# Patient Record
Sex: FEMALE | Race: WHITE | NOT HISPANIC OR LATINO | URBAN - METROPOLITAN AREA
[De-identification: names, ages, dates, MRNs, and addresses within clinical notes are randomized per-mention and may not be internally consistent; named-entity substitution may affect disease eponyms.]

---

## 2024-04-29 ENCOUNTER — EMERGENCY (EMERGENCY)
Facility: HOSPITAL | Age: 69
LOS: 0 days | Discharge: HOME | End: 2024-04-29
Attending: EMERGENCY MEDICINE
Payer: MEDICARE

## 2024-04-29 VITALS
TEMPERATURE: 98 F | SYSTOLIC BLOOD PRESSURE: 164 MMHG | HEART RATE: 81 BPM | OXYGEN SATURATION: 96 % | DIASTOLIC BLOOD PRESSURE: 72 MMHG | RESPIRATION RATE: 18 BRPM

## 2024-04-29 VITALS — HEIGHT: 65 IN

## 2024-04-29 DIAGNOSIS — H35.30 UNSPECIFIED MACULAR DEGENERATION: ICD-10-CM

## 2024-04-29 DIAGNOSIS — R00.2 PALPITATIONS: ICD-10-CM

## 2024-04-29 DIAGNOSIS — I48.92 UNSPECIFIED ATRIAL FLUTTER: ICD-10-CM

## 2024-04-29 LAB
ALBUMIN SERPL ELPH-MCNC: 3.4 G/DL — SIGNIFICANT CHANGE UP (ref 3.3–5)
ALP SERPL-CCNC: 54 U/L — SIGNIFICANT CHANGE UP (ref 40–120)
ALT FLD-CCNC: 22 U/L — SIGNIFICANT CHANGE UP (ref 12–78)
ANION GAP SERPL CALC-SCNC: 2 MMOL/L — LOW (ref 5–17)
APTT BLD: 33.1 SEC — SIGNIFICANT CHANGE UP (ref 24.5–35.6)
AST SERPL-CCNC: 21 U/L — SIGNIFICANT CHANGE UP (ref 15–37)
BASOPHILS # BLD AUTO: 0.04 K/UL — SIGNIFICANT CHANGE UP (ref 0–0.2)
BASOPHILS NFR BLD AUTO: 0.5 % — SIGNIFICANT CHANGE UP (ref 0–2)
BILIRUB SERPL-MCNC: 0.3 MG/DL — SIGNIFICANT CHANGE UP (ref 0.2–1.2)
BUN SERPL-MCNC: 20 MG/DL — SIGNIFICANT CHANGE UP (ref 7–23)
CALCIUM SERPL-MCNC: 9.1 MG/DL — SIGNIFICANT CHANGE UP (ref 8.5–10.1)
CHLORIDE SERPL-SCNC: 108 MMOL/L — SIGNIFICANT CHANGE UP (ref 96–108)
CO2 SERPL-SCNC: 30 MMOL/L — SIGNIFICANT CHANGE UP (ref 22–31)
CREAT SERPL-MCNC: 1.23 MG/DL — SIGNIFICANT CHANGE UP (ref 0.5–1.3)
EGFR: 48 ML/MIN/1.73M2 — LOW
EOSINOPHIL # BLD AUTO: 0.11 K/UL — SIGNIFICANT CHANGE UP (ref 0–0.5)
EOSINOPHIL NFR BLD AUTO: 1.3 % — SIGNIFICANT CHANGE UP (ref 0–6)
GLUCOSE SERPL-MCNC: 87 MG/DL — SIGNIFICANT CHANGE UP (ref 70–99)
HCT VFR BLD CALC: 47.4 % — HIGH (ref 34.5–45)
HGB BLD-MCNC: 14.9 G/DL — SIGNIFICANT CHANGE UP (ref 11.5–15.5)
IMM GRANULOCYTES NFR BLD AUTO: 0.4 % — SIGNIFICANT CHANGE UP (ref 0–0.9)
INR BLD: 0.9 RATIO — SIGNIFICANT CHANGE UP (ref 0.85–1.18)
LYMPHOCYTES # BLD AUTO: 2 K/UL — SIGNIFICANT CHANGE UP (ref 1–3.3)
LYMPHOCYTES # BLD AUTO: 23.7 % — SIGNIFICANT CHANGE UP (ref 13–44)
MAGNESIUM SERPL-MCNC: 2.2 MG/DL — SIGNIFICANT CHANGE UP (ref 1.6–2.6)
MCHC RBC-ENTMCNC: 30 PG — SIGNIFICANT CHANGE UP (ref 27–34)
MCHC RBC-ENTMCNC: 31.4 GM/DL — LOW (ref 32–36)
MCV RBC AUTO: 95.4 FL — SIGNIFICANT CHANGE UP (ref 80–100)
MONOCYTES # BLD AUTO: 0.69 K/UL — SIGNIFICANT CHANGE UP (ref 0–0.9)
MONOCYTES NFR BLD AUTO: 8.2 % — SIGNIFICANT CHANGE UP (ref 2–14)
NEUTROPHILS # BLD AUTO: 5.58 K/UL — SIGNIFICANT CHANGE UP (ref 1.8–7.4)
NEUTROPHILS NFR BLD AUTO: 65.9 % — SIGNIFICANT CHANGE UP (ref 43–77)
NT-PROBNP SERPL-SCNC: 801 PG/ML — HIGH (ref 0–125)
PHOSPHATE SERPL-MCNC: 3.6 MG/DL — SIGNIFICANT CHANGE UP (ref 2.5–4.5)
PLATELET # BLD AUTO: 244 K/UL — SIGNIFICANT CHANGE UP (ref 150–400)
POTASSIUM SERPL-MCNC: 4.2 MMOL/L — SIGNIFICANT CHANGE UP (ref 3.5–5.3)
POTASSIUM SERPL-SCNC: 4.2 MMOL/L — SIGNIFICANT CHANGE UP (ref 3.5–5.3)
PROT SERPL-MCNC: 6.9 GM/DL — SIGNIFICANT CHANGE UP (ref 6–8.3)
PROTHROM AB SERPL-ACNC: 10.2 SEC — SIGNIFICANT CHANGE UP (ref 9.5–13)
RBC # BLD: 4.97 M/UL — SIGNIFICANT CHANGE UP (ref 3.8–5.2)
RBC # FLD: 14.1 % — SIGNIFICANT CHANGE UP (ref 10.3–14.5)
SODIUM SERPL-SCNC: 140 MMOL/L — SIGNIFICANT CHANGE UP (ref 135–145)
TROPONIN I, HIGH SENSITIVITY RESULT: 20.9 NG/L — SIGNIFICANT CHANGE UP
TROPONIN I, HIGH SENSITIVITY RESULT: 23.91 NG/L — SIGNIFICANT CHANGE UP
TSH SERPL-MCNC: 1.37 UU/ML — SIGNIFICANT CHANGE UP (ref 0.34–4.82)
WBC # BLD: 8.45 K/UL — SIGNIFICANT CHANGE UP (ref 3.8–10.5)
WBC # FLD AUTO: 8.45 K/UL — SIGNIFICANT CHANGE UP (ref 3.8–10.5)

## 2024-04-29 PROCEDURE — 93010 ELECTROCARDIOGRAM REPORT: CPT

## 2024-04-29 PROCEDURE — 84484 ASSAY OF TROPONIN QUANT: CPT | Mod: 59

## 2024-04-29 PROCEDURE — 85025 COMPLETE CBC W/AUTO DIFF WBC: CPT

## 2024-04-29 PROCEDURE — 85730 THROMBOPLASTIN TIME PARTIAL: CPT

## 2024-04-29 PROCEDURE — 84100 ASSAY OF PHOSPHORUS: CPT

## 2024-04-29 PROCEDURE — 71046 X-RAY EXAM CHEST 2 VIEWS: CPT | Mod: 26

## 2024-04-29 PROCEDURE — 80053 COMPREHEN METABOLIC PANEL: CPT

## 2024-04-29 PROCEDURE — 36415 COLL VENOUS BLD VENIPUNCTURE: CPT

## 2024-04-29 PROCEDURE — 84443 ASSAY THYROID STIM HORMONE: CPT

## 2024-04-29 PROCEDURE — 99285 EMERGENCY DEPT VISIT HI MDM: CPT | Mod: 25

## 2024-04-29 PROCEDURE — 83880 ASSAY OF NATRIURETIC PEPTIDE: CPT

## 2024-04-29 PROCEDURE — 99285 EMERGENCY DEPT VISIT HI MDM: CPT

## 2024-04-29 PROCEDURE — 85610 PROTHROMBIN TIME: CPT

## 2024-04-29 PROCEDURE — 83735 ASSAY OF MAGNESIUM: CPT

## 2024-04-29 PROCEDURE — 93005 ELECTROCARDIOGRAM TRACING: CPT

## 2024-04-29 PROCEDURE — 71046 X-RAY EXAM CHEST 2 VIEWS: CPT

## 2024-04-29 NOTE — ED PROVIDER NOTE - PATIENT PORTAL LINK FT
You can access the FollowMyHealth Patient Portal offered by [facility] by registering at the following website: [email or web address]. By joining [facility] FollowMyHealth portal, you will also be able to view your health information using other applications (apps) compatible with our system.

## 2024-04-29 NOTE — ED STATDOCS - PROGRESS NOTE DETAILS
Belgica Gomes for attending Dr. Knight: 68 y/o female with a PMHx of macular degeneration presents to the ED sent by Dr. Brower. Pt was seen in Dr. Brower's office regarding palpitations, had EKG in office showing SVT to 163 and was sent to the ED for evaluation. Denies CP. Smoker. Will send pt to main ED for further evaluation.

## 2024-04-29 NOTE — ED PROVIDER NOTE - OBJECTIVE STATEMENT
68 y/o female with PMHx of macular degeneration, chronic constipation presents to the ED SIB MD Dr. Brower for A-flutter noted at outpatient office. Patient states she had too much to eat at a diner and afterward presented to her PCP, who referred her to the ED for A-flutter. Patient states she has history of palpitations and these episodes are normally resolved with ASA. Denies chest pain, SOB, abdominal pain.

## 2024-04-29 NOTE — ED PROVIDER NOTE - PROGRESS NOTE DETAILS
second troponin negative, spoke with Dr. Funk via teams may be dc and fu in office tomorrow B Rey MCKINNEY

## 2024-04-29 NOTE — ED ADULT TRIAGE NOTE - CCCP TRG CHIEF CMPLNT
[FreeTextEntry1] : Patient is s/p MVR with a bioprosthetic valve in 2013.  This was performed for primary MR.  Recently she has noted increasing dyspnea on moderate exertion, Class 2.  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
sent by MD

## 2024-04-29 NOTE — ED PROVIDER NOTE - CARE PROVIDER_API CALL
Dino Funk  Interventional Cardiology  [address] [place] [address]  Phone: [phone]  Fax: [phone]  Follow Up Time:

## 2024-04-29 NOTE — ED PROVIDER NOTE - PHYSICAL EXAMINATION
Gen:  Well appearing in NAD  Head:  NC/AT  HEENT: pupils perrl,no pharyngeal erythema, uvula midline  Cardiac: S1S2, RRR. HR 70s, sinus rhythm.  Abd: Soft, non tender  Resp: No distress, CTA   musculoskeletal:: no deformities, no swelling, strength +5/+5  Skin: warm and dry as visualized, no rashes  Neuro: ASHLEY, aao x 4  Psych:alert, cooperative, appropriate mood and affect for situation

## 2024-04-29 NOTE — ED ADULT TRIAGE NOTE - CHIEF COMPLAINT QUOTE
Pt sent in by MD Brower for palpitations. Pt reports her EKG said SVT. Pt's HR in triage is irregular going from 120s to the 80s and back up. Pt denies CP or SOB. pt has hx of HLD and denies hx of AFIB. STAT EKG in triage.

## 2024-04-29 NOTE — ED ADULT NURSE NOTE - NSFALLUNIVINTERV_ED_ALL_ED
Bed/Stretcher in lowest position, wheels locked, appropriate side rails in place/Call bell, personal items and telephone in reach/Instruct patient to call for assistance before getting out of bed/chair/stretcher/Non-slip footwear applied when patient is off stretcher/[place] to call system/Physically safe environment - no spills, clutter or unnecessary equipment/Purposeful proactive rounding/Room/bathroom lighting operational, light cord in reach

## 2024-04-29 NOTE — ED PROVIDER NOTE - NSFOLLOWUPINSTRUCTIONS_ED_ALL_ED_FT
Palpitations  Palpitations are feelings that your heartbeat is not normal. Your heartbeat may feel like it is:  Uneven (irregular).  Faster than normal.  Fluttering.  Skipping a beat.  This is usually not a serious problem. However, a doctor will do tests and check your medical history to make sure that you do not have a serious heart problem.    Follow these instructions at home:  Watch for any changes in your condition. Tell your doctor about any changes. Take these actions to help manage your symptoms:    Eating and drinking    Follow instructions from your doctor about things to eat and drink. You may be told to avoid these things:  Drinks that have caffeine in them, such as coffee, tea, soft drinks, and energy drinks.  Chocolate.  Alcohol.  Diet pills.  Lifestyle    A person sitting on the floor doing yoga.  A sign telling the reader not to smoke.  Try to lower your stress. These things can help you relax:  Yoga.  Deep breathing and meditation.  Guided imagery. This is using words and images to create positive thoughts.  Exercise, including swimming, jogging, and walking. Tell your doctor if you have more abnormal heartbeats when you are active. If you have chest pain or feel short of breath with exercise, do not keep doing the exercise until you are seen by your doctor.  Biofeedback. This is using your mind to control things in your body, such as your heartbeat.  Get plenty of rest and sleep. Keep a regular bed time.  Do not use drugs, such as cocaine or ecstasy. Do not use marijuana.  Do not smoke or use any products that contain nicotine or tobacco. If you need help quitting, ask your doctor.  General instructions    Take over-the-counter and prescription medicines only as told by your doctor.  Keep all follow-up visits. You may need more tests if palpitations do not go away or get worse.  Contact a doctor if:  You keep having fast or uneven heartbeats for a long time.  Your symptoms happen more often.  Get help right away if:  You have chest pain.  You feel short of breath.  You have a very bad headache.  You feel dizzy.  You faint.  These symptoms may be an emergency. Get help right away. Call your local emergency services (911 in the [place]  Do not wait to see if the symptoms will go away.  Do not drive yourself to the hospital.  Summary  Palpitations are feelings that your heartbeat is uneven or faster than normal. It may feel like your heart is fluttering or skipping a beat.  Avoid food and drinks that may cause this condition. These include caffeine, chocolate, and alcohol.  Try to lower your stress. Do not smoke or use drugs.  Get help right away if you faint, feel dizzy, feel short of breath, have chest pain, or have a very bad headache.  This information is not intended to replace advice given to you by your health care provider. Make sure you discuss any questions you have with your health care provider.    Document Revised: 05/11/2022 Document Reviewed: 05/11/2022  [employer] Patient Education © 2024 [employer]  [employer] logo  Terms and Conditions  Privacy Policy  Editorial Policy  All content on this site: Copyright © 2024 [employer], its licensors, and contributors. All rights are reserved, including those for text and data mining, AI training, and similar technologies. For all open access content, the Creative Commons licensing terms apply.  Cookies are used by this site. To decline or learn more, visit our Cookies page.

## 2024-06-03 PROBLEM — H35.30 UNSPECIFIED MACULAR DEGENERATION: Chronic | Status: ACTIVE | Noted: 2024-04-29

## 2024-06-10 NOTE — H&P ADULT - HISTORY OF PRESENT ILLNESS
70 y/o F with PMHx of HLD current smoking, family hx of early MI (mother passed at 64)  presented to cardiology after being seen in the ED for palpitations. CE neg x 2, in ED. Cardiac stress test done suggestive of LAD ischemia. Referred for cardiac cath to further evaluate.  70 y/o F with PMHx of HLD current smoking, family hx of early MI (mother passed at 64)  presented to cardiology after being seen in the ED for palpitations. CE neg x 2, in ED. Pt reports SOB with minimal activities such as walking around the house.  Cardiac stress test done suggestive of LAD ischemia. Referred for cardiac cath to further evaluate.

## 2024-06-10 NOTE — H&P ADULT - NSHPREVIEWOFSYSTEMS_GEN_ALL_CORE
REVIEW OF SYSTEMS:    CONSTITUTIONAL: No weakness, fevers or chills  EYES/ENT: No visual changes;  No vertigo or throat pain   NECK: No pain or stiffness  RESPIRATORY: No cough, wheezing, hemoptysis; + shortness of breath on exertion  CARDIOVASCULAR: No chest pain or palpitations  GASTROINTESTINAL: No abdominal or epigastric pain. No nausea, vomiting, or hematemesis; No diarrhea or constipation. No melena or hematochezia.  GENITOURINARY: No dysuria, frequency or hematuria  NEUROLOGICAL: No numbness or weakness  SKIN: No itching, burning, rashes, or lesions   All other review of systems is negative unless indicated above.

## 2024-06-10 NOTE — H&P ADULT - ASSESSMENT
70 y/o F with PMHx of HLD current smoking, family hx of early MI (mother passed at 64)  presented to cardiology after being seen in the ED for palpitations. CE neg x 2, in ED. Cardiac stress test done suggestive of LAD ischemia. Referred for cardiac cath to further evaluate.     ASA class:  Creatinine:  GFR:  Bleeding  Risk score:  Hussain Score:  70 y/o F with PMHx of HLD current smoking, family hx of early MI (mother passed at 64)  presented to cardiology after being seen in the ED for palpitations. CE neg x 2, in ED. Cardiac stress test done suggestive of LAD ischemia. Referred for cardiac cath to further evaluate.     ASA class:II  Creatinine:1.24  GFR:48  Bleeding  Risk score:1.24  Hussain Score: 3 points

## 2024-06-10 NOTE — H&P ADULT - NSHPLABSRESULTS_GEN_ALL_CORE
6/9/24 STRESS- LAD moderate disease    6/1/24 ECHO- EF 55-60% 6/9/24 STRESS- LAD moderate disease    6/1/24 ECHO- EF 55-60%  6/11/24: EKG  NSR                          15.8   9.37  )-----------( 232      ( 11 Jun 2024 07:30 )             49.6

## 2024-06-10 NOTE — H&P ADULT - NSHPPHYSICALEXAM_GEN_ALL_CORE
PHYSICAL EXAM:    Constitutional: NAD,   Neuro: Alert and oriented x 3 Gait steady Speech clear No focal deficits  Neck: No JVD No bruit  Respiratory: CTAB  Cardiovascular: S1 and S2, RRR,   Gastrointestinal: BS+, soft, NT/ND  Extremities: No clubbing cyanosis or edema No varicosities  Vascular: 2+ peripheral pulses  Psychiatric: Normal mood, normal affect  Musculoskeletal: 5/5 strength b/l upper and lower extremities  Skin: excoriated area under right lower abdominal fold

## 2024-06-11 ENCOUNTER — RESULT REVIEW (OUTPATIENT)
Age: 69
End: 2024-06-11

## 2024-06-11 ENCOUNTER — INPATIENT (INPATIENT)
Facility: HOSPITAL | Age: 69
LOS: 7 days | Discharge: HOME | DRG: 998 | End: 2024-06-19
Attending: THORACIC SURGERY (CARDIOTHORACIC VASCULAR SURGERY) | Admitting: THORACIC SURGERY (CARDIOTHORACIC VASCULAR SURGERY)
Payer: MEDICARE

## 2024-06-11 ENCOUNTER — OUTPATIENT (OUTPATIENT)
Dept: OUTPATIENT SERVICES | Facility: HOSPITAL | Age: 69
LOS: 1 days | Discharge: ANOTHER HOSPITAL | End: 2024-06-11
Payer: MEDICARE

## 2024-06-11 VITALS
SYSTOLIC BLOOD PRESSURE: 181 MMHG | OXYGEN SATURATION: 95 % | WEIGHT: 169.98 LBS | RESPIRATION RATE: 18 BRPM | HEIGHT: 65 IN | HEART RATE: 75 BPM | DIASTOLIC BLOOD PRESSURE: 87 MMHG | TEMPERATURE: 99 F

## 2024-06-11 VITALS
HEART RATE: 60 BPM | RESPIRATION RATE: 18 BRPM | OXYGEN SATURATION: 97 % | SYSTOLIC BLOOD PRESSURE: 154 MMHG | DIASTOLIC BLOOD PRESSURE: 73 MMHG

## 2024-06-11 VITALS
DIASTOLIC BLOOD PRESSURE: 87 MMHG | OXYGEN SATURATION: 98 % | WEIGHT: 186.51 LBS | SYSTOLIC BLOOD PRESSURE: 167 MMHG | HEART RATE: 62 BPM | RESPIRATION RATE: 17 BRPM | TEMPERATURE: 98 F | HEIGHT: 65 IN

## 2024-06-11 DIAGNOSIS — I25.1 ATHEROSCLEROTIC HEART DISEASE OF NATIVE CORONARY ARTERY: ICD-10-CM

## 2024-06-11 DIAGNOSIS — S02.609A FRACTURE OF MANDIBLE, UNSPECIFIED, INITIAL ENCOUNTER FOR CLOSED FRACTURE: Chronic | ICD-10-CM

## 2024-06-11 DIAGNOSIS — R94.39 ABNORMAL RESULT OF OTHER CARDIOVASCULAR FUNCTION STUDY: ICD-10-CM

## 2024-06-11 DIAGNOSIS — I25.10 ATHEROSCLEROTIC HEART DISEASE OF NATIVE CORONARY ARTERY WITHOUT ANGINA PECTORIS: ICD-10-CM

## 2024-06-11 PROBLEM — F17.200 NICOTINE DEPENDENCE, UNSPECIFIED, UNCOMPLICATED: Chronic | Status: ACTIVE | Noted: 2024-06-10

## 2024-06-11 PROBLEM — E78.5 HYPERLIPIDEMIA, UNSPECIFIED: Chronic | Status: ACTIVE | Noted: 2024-06-10

## 2024-06-11 PROBLEM — Z00.00 ENCOUNTER FOR PREVENTIVE HEALTH EXAMINATION: Status: ACTIVE | Noted: 2024-06-11

## 2024-06-11 LAB
A1C WITH ESTIMATED AVERAGE GLUCOSE RESULT: 5.6 % — SIGNIFICANT CHANGE UP (ref 4–5.6)
ALBUMIN SERPL ELPH-MCNC: 3.8 G/DL — SIGNIFICANT CHANGE UP (ref 3.3–5.2)
ALP SERPL-CCNC: 53 U/L — SIGNIFICANT CHANGE UP (ref 40–120)
ALT FLD-CCNC: 12 U/L — SIGNIFICANT CHANGE UP
ANION GAP SERPL CALC-SCNC: 1 MMOL/L — LOW (ref 5–17)
ANION GAP SERPL CALC-SCNC: 11 MMOL/L — SIGNIFICANT CHANGE UP (ref 5–17)
APPEARANCE UR: CLEAR — SIGNIFICANT CHANGE UP
APTT BLD: 30.1 SEC — SIGNIFICANT CHANGE UP (ref 24.5–35.6)
AST SERPL-CCNC: 20 U/L — SIGNIFICANT CHANGE UP
BASE EXCESS BLDA CALC-SCNC: 9.1 MMOL/L — HIGH (ref -2–3)
BASOPHILS # BLD AUTO: 0.04 K/UL — SIGNIFICANT CHANGE UP (ref 0–0.2)
BASOPHILS NFR BLD AUTO: 0.5 % — SIGNIFICANT CHANGE UP (ref 0–2)
BILIRUB SERPL-MCNC: 0.3 MG/DL — LOW (ref 0.4–2)
BILIRUB UR-MCNC: NEGATIVE — SIGNIFICANT CHANGE UP
BLD GP AB SCN SERPL QL: SIGNIFICANT CHANGE UP
BLOOD GAS COMMENTS ARTERIAL: SIGNIFICANT CHANGE UP
BUN SERPL-MCNC: 13.4 MG/DL — SIGNIFICANT CHANGE UP (ref 8–20)
BUN SERPL-MCNC: 17 MG/DL — SIGNIFICANT CHANGE UP (ref 7–23)
CALCIUM SERPL-MCNC: 8.5 MG/DL — SIGNIFICANT CHANGE UP (ref 8.4–10.5)
CALCIUM SERPL-MCNC: 9.2 MG/DL — SIGNIFICANT CHANGE UP (ref 8.5–10.1)
CHLORIDE SERPL-SCNC: 104 MMOL/L — SIGNIFICANT CHANGE UP (ref 96–108)
CHLORIDE SERPL-SCNC: 108 MMOL/L — SIGNIFICANT CHANGE UP (ref 96–108)
CO2 SERPL-SCNC: 28 MMOL/L — SIGNIFICANT CHANGE UP (ref 22–29)
CO2 SERPL-SCNC: 31 MMOL/L — SIGNIFICANT CHANGE UP (ref 22–31)
COLOR SPEC: YELLOW — SIGNIFICANT CHANGE UP
CREAT SERPL-MCNC: 0.54 MG/DL — SIGNIFICANT CHANGE UP (ref 0.5–1.3)
CREAT SERPL-MCNC: 0.78 MG/DL — SIGNIFICANT CHANGE UP (ref 0.5–1.3)
DIFF PNL FLD: NEGATIVE — SIGNIFICANT CHANGE UP
EGFR: 100 ML/MIN/1.73M2 — SIGNIFICANT CHANGE UP
EGFR: 83 ML/MIN/1.73M2 — SIGNIFICANT CHANGE UP
EOSINOPHIL # BLD AUTO: 0.11 K/UL — SIGNIFICANT CHANGE UP (ref 0–0.5)
EOSINOPHIL NFR BLD AUTO: 1.4 % — SIGNIFICANT CHANGE UP (ref 0–6)
ESTIMATED AVERAGE GLUCOSE: 114 MG/DL — SIGNIFICANT CHANGE UP (ref 68–114)
GAS PNL BLDA: SIGNIFICANT CHANGE UP
GLUCOSE SERPL-MCNC: 105 MG/DL — HIGH (ref 70–99)
GLUCOSE SERPL-MCNC: 96 MG/DL — SIGNIFICANT CHANGE UP (ref 70–99)
GLUCOSE UR QL: NEGATIVE MG/DL — SIGNIFICANT CHANGE UP
HCO3 BLDA-SCNC: 33 MMOL/L — HIGH (ref 21–28)
HCT VFR BLD CALC: 44.5 % — SIGNIFICANT CHANGE UP (ref 34.5–45)
HCT VFR BLD CALC: 49.6 % — HIGH (ref 34.5–45)
HGB BLD-MCNC: 14.3 G/DL — SIGNIFICANT CHANGE UP (ref 11.5–15.5)
HGB BLD-MCNC: 15.8 G/DL — HIGH (ref 11.5–15.5)
HOROWITZ INDEX BLDA+IHG-RTO: SIGNIFICANT CHANGE UP
IMM GRANULOCYTES NFR BLD AUTO: 0.4 % — SIGNIFICANT CHANGE UP (ref 0–0.9)
INR BLD: 0.98 RATIO — SIGNIFICANT CHANGE UP (ref 0.85–1.18)
KETONES UR-MCNC: NEGATIVE MG/DL — SIGNIFICANT CHANGE UP
LEUKOCYTE ESTERASE UR-ACNC: NEGATIVE — SIGNIFICANT CHANGE UP
LYMPHOCYTES # BLD AUTO: 2.14 K/UL — SIGNIFICANT CHANGE UP (ref 1–3.3)
LYMPHOCYTES # BLD AUTO: 26.6 % — SIGNIFICANT CHANGE UP (ref 13–44)
MAGNESIUM SERPL-MCNC: 1.9 MG/DL — SIGNIFICANT CHANGE UP (ref 1.8–2.6)
MCHC RBC-ENTMCNC: 30.2 PG — SIGNIFICANT CHANGE UP (ref 27–34)
MCHC RBC-ENTMCNC: 30.4 PG — SIGNIFICANT CHANGE UP (ref 27–34)
MCHC RBC-ENTMCNC: 31.9 GM/DL — LOW (ref 32–36)
MCHC RBC-ENTMCNC: 32.1 GM/DL — SIGNIFICANT CHANGE UP (ref 32–36)
MCV RBC AUTO: 94.5 FL — SIGNIFICANT CHANGE UP (ref 80–100)
MCV RBC AUTO: 94.7 FL — SIGNIFICANT CHANGE UP (ref 80–100)
MONOCYTES # BLD AUTO: 0.7 K/UL — SIGNIFICANT CHANGE UP (ref 0–0.9)
MONOCYTES NFR BLD AUTO: 8.7 % — SIGNIFICANT CHANGE UP (ref 2–14)
MRSA PCR RESULT.: SIGNIFICANT CHANGE UP
NEUTROPHILS # BLD AUTO: 5.04 K/UL — SIGNIFICANT CHANGE UP (ref 1.8–7.4)
NEUTROPHILS NFR BLD AUTO: 62.4 % — SIGNIFICANT CHANGE UP (ref 43–77)
NITRITE UR-MCNC: NEGATIVE — SIGNIFICANT CHANGE UP
NT-PROBNP SERPL-SCNC: 724 PG/ML — HIGH (ref 0–300)
PA ADP PRP-ACNC: 212 PRU — SIGNIFICANT CHANGE UP (ref 180–376)
PCO2 BLDA: 49 MMHG — HIGH (ref 32–45)
PH BLDA: 7.44 — SIGNIFICANT CHANGE UP (ref 7.35–7.45)
PH UR: 5.5 — SIGNIFICANT CHANGE UP (ref 5–8)
PLATELET # BLD AUTO: 200 K/UL — SIGNIFICANT CHANGE UP (ref 150–400)
PLATELET # BLD AUTO: 232 K/UL — SIGNIFICANT CHANGE UP (ref 150–400)
PO2 BLDA: 59 MMHG — LOW (ref 83–108)
POTASSIUM SERPL-MCNC: 4 MMOL/L — SIGNIFICANT CHANGE UP (ref 3.5–5.3)
POTASSIUM SERPL-MCNC: 4.2 MMOL/L — SIGNIFICANT CHANGE UP (ref 3.5–5.3)
POTASSIUM SERPL-SCNC: 4 MMOL/L — SIGNIFICANT CHANGE UP (ref 3.5–5.3)
POTASSIUM SERPL-SCNC: 4.2 MMOL/L — SIGNIFICANT CHANGE UP (ref 3.5–5.3)
PREALB SERPL-MCNC: 20 MG/DL — SIGNIFICANT CHANGE UP (ref 18–38)
PROT SERPL-MCNC: 6.4 G/DL — LOW (ref 6.6–8.7)
PROT UR-MCNC: SIGNIFICANT CHANGE UP MG/DL
PROTHROM AB SERPL-ACNC: 10.9 SEC — SIGNIFICANT CHANGE UP (ref 9.5–13)
RBC # BLD: 4.71 M/UL — SIGNIFICANT CHANGE UP (ref 3.8–5.2)
RBC # BLD: 5.24 M/UL — HIGH (ref 3.8–5.2)
RBC # FLD: 13.9 % — SIGNIFICANT CHANGE UP (ref 10.3–14.5)
RBC # FLD: 14 % — SIGNIFICANT CHANGE UP (ref 10.3–14.5)
S AUREUS DNA NOSE QL NAA+PROBE: SIGNIFICANT CHANGE UP
SAO2 % BLDA: 93.3 % — LOW (ref 94–98)
SODIUM SERPL-SCNC: 140 MMOL/L — SIGNIFICANT CHANGE UP (ref 135–145)
SODIUM SERPL-SCNC: 143 MMOL/L — SIGNIFICANT CHANGE UP (ref 135–145)
SP GR SPEC: >1.03 — HIGH (ref 1–1.03)
TSH SERPL-MCNC: 1.32 UIU/ML — SIGNIFICANT CHANGE UP (ref 0.27–4.2)
UROBILINOGEN FLD QL: 1 MG/DL — SIGNIFICANT CHANGE UP (ref 0.2–1)
WBC # BLD: 8.06 K/UL — SIGNIFICANT CHANGE UP (ref 3.8–10.5)
WBC # BLD: 9.37 K/UL — SIGNIFICANT CHANGE UP (ref 3.8–10.5)
WBC # FLD AUTO: 8.06 K/UL — SIGNIFICANT CHANGE UP (ref 3.8–10.5)
WBC # FLD AUTO: 9.37 K/UL — SIGNIFICANT CHANGE UP (ref 3.8–10.5)

## 2024-06-11 PROCEDURE — C1894: CPT

## 2024-06-11 PROCEDURE — 93010 ELECTROCARDIOGRAM REPORT: CPT

## 2024-06-11 PROCEDURE — 93005 ELECTROCARDIOGRAM TRACING: CPT | Mod: XU

## 2024-06-11 PROCEDURE — 93458 L HRT ARTERY/VENTRICLE ANGIO: CPT

## 2024-06-11 PROCEDURE — C1887: CPT

## 2024-06-11 PROCEDURE — C1769: CPT

## 2024-06-11 PROCEDURE — 0523T NTRAPX C FFR W/3D FUNCJL MAP: CPT

## 2024-06-11 PROCEDURE — 85027 COMPLETE CBC AUTOMATED: CPT

## 2024-06-11 PROCEDURE — 36415 COLL VENOUS BLD VENIPUNCTURE: CPT

## 2024-06-11 PROCEDURE — 93880 EXTRACRANIAL BILAT STUDY: CPT | Mod: 26

## 2024-06-11 PROCEDURE — 71045 X-RAY EXAM CHEST 1 VIEW: CPT | Mod: 26

## 2024-06-11 PROCEDURE — 80048 BASIC METABOLIC PNL TOTAL CA: CPT

## 2024-06-11 RX ORDER — ACETAMINOPHEN 500 MG
650 TABLET ORAL EVERY 6 HOURS
Refills: 0 | Status: DISCONTINUED | OUTPATIENT
Start: 2024-06-11 | End: 2024-06-11

## 2024-06-11 RX ORDER — SODIUM CHLORIDE 9 MG/ML
250 INJECTION INTRAMUSCULAR; INTRAVENOUS; SUBCUTANEOUS ONCE
Refills: 0 | Status: COMPLETED | OUTPATIENT
Start: 2024-06-11 | End: 2024-06-11

## 2024-06-11 RX ORDER — NICOTINE POLACRILEX 2 MG
1 GUM BUCCAL DAILY
Refills: 0 | Status: DISCONTINUED | OUTPATIENT
Start: 2024-06-11 | End: 2024-06-11

## 2024-06-11 RX ORDER — FOLIC ACID
1 POWDER (GRAM) MISCELLANEOUS DAILY
Refills: 0 | Status: DISCONTINUED | OUTPATIENT
Start: 2024-06-11 | End: 2024-06-14

## 2024-06-11 RX ORDER — TIOTROPIUM BROMIDE 18 UG/1
2 CAPSULE ORAL; RESPIRATORY (INHALATION) DAILY
Refills: 0 | Status: DISCONTINUED | OUTPATIENT
Start: 2024-06-11 | End: 2024-06-14

## 2024-06-11 RX ORDER — MUPIROCIN 20 MG/G
1 OINTMENT TOPICAL
Refills: 0 | Status: DISCONTINUED | OUTPATIENT
Start: 2024-06-11 | End: 2024-06-11

## 2024-06-11 RX ORDER — METOPROLOL TARTRATE 50 MG
5 TABLET ORAL ONCE
Refills: 0 | Status: COMPLETED | OUTPATIENT
Start: 2024-06-11 | End: 2024-06-11

## 2024-06-11 RX ORDER — THIAMINE HCL 100 MG
100 TABLET ORAL DAILY
Refills: 0 | Status: DISCONTINUED | OUTPATIENT
Start: 2024-06-11 | End: 2024-06-14

## 2024-06-11 RX ORDER — LABETALOL HCL 100 MG
20 TABLET ORAL ONCE
Refills: 0 | Status: COMPLETED | OUTPATIENT
Start: 2024-06-11 | End: 2024-06-11

## 2024-06-11 RX ORDER — NICOTINE POLACRILEX 2 MG/1
1 LOZENGE ORAL DAILY
Refills: 0 | Status: DISCONTINUED | OUTPATIENT
Start: 2024-06-11 | End: 2024-06-14

## 2024-06-11 RX ORDER — METOPROLOL TARTRATE 50 MG
25 TABLET ORAL
Refills: 0 | Status: DISCONTINUED | OUTPATIENT
Start: 2024-06-11 | End: 2024-06-13

## 2024-06-11 RX ORDER — ENOXAPARIN SODIUM 100 MG/ML
40 INJECTION SUBCUTANEOUS EVERY 24 HOURS
Refills: 0 | Status: DISCONTINUED | OUTPATIENT
Start: 2024-06-11 | End: 2024-06-13

## 2024-06-11 RX ORDER — ATORVASTATIN CALCIUM 20 MG/1
20 TABLET, FILM COATED ORAL AT BEDTIME
Refills: 0 | Status: DISCONTINUED | OUTPATIENT
Start: 2024-06-11 | End: 2024-06-11

## 2024-06-11 RX ORDER — ATORVASTATIN CALCIUM 80 MG/1
20 TABLET, FILM COATED ORAL AT BEDTIME
Refills: 0 | Status: DISCONTINUED | OUTPATIENT
Start: 2024-06-11 | End: 2024-06-11

## 2024-06-11 RX ORDER — SODIUM CHLORIDE 0.9 % (FLUSH) 0.9 %
3 SYRINGE (ML) INJECTION EVERY 8 HOURS
Refills: 0 | Status: DISCONTINUED | OUTPATIENT
Start: 2024-06-11 | End: 2024-06-14

## 2024-06-11 RX ORDER — ATORVASTATIN CALCIUM 20 MG/1
80 TABLET, FILM COATED ORAL AT BEDTIME
Refills: 0 | Status: DISCONTINUED | OUTPATIENT
Start: 2024-06-11 | End: 2024-06-14

## 2024-06-11 RX ORDER — BUDESONIDE/FORMOTEROL FUMARATE 160-4.5MCG
2 HFA AEROSOL WITH ADAPTER (GRAM) INHALATION
Refills: 0 | Status: DISCONTINUED | OUTPATIENT
Start: 2024-06-11 | End: 2024-06-14

## 2024-06-11 RX ORDER — SODIUM CHLORIDE 9 MG/ML
1000 INJECTION INTRAMUSCULAR; INTRAVENOUS; SUBCUTANEOUS
Refills: 0 | Status: DISCONTINUED | OUTPATIENT
Start: 2024-06-11 | End: 2024-06-11

## 2024-06-11 RX ORDER — ASPIRIN 325 MG/1
81 TABLET, FILM COATED ORAL DAILY
Refills: 0 | Status: DISCONTINUED | OUTPATIENT
Start: 2024-06-11 | End: 2024-06-13

## 2024-06-11 RX ADMIN — Medication 3 MILLILITER(S): at 14:17

## 2024-06-11 RX ADMIN — Medication 20 MILLIGRAM(S): at 10:20

## 2024-06-11 RX ADMIN — Medication 1 APPLICATION(S): at 17:34

## 2024-06-11 RX ADMIN — SODIUM CHLORIDE 150 MILLILITER(S): 9 INJECTION INTRAMUSCULAR; INTRAVENOUS; SUBCUTANEOUS at 11:10

## 2024-06-11 RX ADMIN — SODIUM CHLORIDE 250 MILLILITER(S): 9 INJECTION INTRAMUSCULAR; INTRAVENOUS; SUBCUTANEOUS at 08:11

## 2024-06-11 RX ADMIN — Medication 15 MILLILITER(S): at 17:32

## 2024-06-11 RX ADMIN — Medication 3 MILLILITER(S): at 21:37

## 2024-06-11 RX ADMIN — Medication 25 MILLIGRAM(S): at 21:35

## 2024-06-11 RX ADMIN — NICOTINE POLACRILEX 1 PATCH: 2 LOZENGE ORAL at 19:00

## 2024-06-11 RX ADMIN — Medication 5 MILLIGRAM(S): at 10:02

## 2024-06-11 RX ADMIN — NICOTINE POLACRILEX 1 PATCH: 2 LOZENGE ORAL at 17:32

## 2024-06-11 RX ADMIN — ATORVASTATIN CALCIUM 80 MILLIGRAM(S): 20 TABLET, FILM COATED ORAL at 21:35

## 2024-06-11 NOTE — ASU DISCHARGE PLAN (ADULT/PEDIATRIC) - COMMENTS
Pt being transferred from cardiac cath directly to [facility] for CABG.   Via [facility]

## 2024-06-11 NOTE — ASU PREOP CHECKLIST - BP NONINVASIVE SYSTOLIC (MM HG)
30 day supply sent to pharm, [facility]    181

## 2024-06-11 NOTE — PACU DISCHARGE NOTE - COMMENTS
pt aaox4, pt denies any symptoms, vss, report given to Jeana KERR from [facility] 4 tower, clean dry dressing intact over right wrist, no s/sx of hematoma, swelling, or bleeding noted.  Right wrist soft and mobile. awaiting for ambulance.

## 2024-06-11 NOTE — PROGRESS NOTE ADULT - SUBJECTIVE AND OBJECTIVE BOX
Nurse Practitioner Progress note:     HPI:  70 y/o F with PMHx of HLD current smoking, family hx of early MI (mother passed at 64)  presented to cardiology after being seen in the ED for palpitations. CE neg x 2, in ED. Pt reports SOB with minimal activities such as walking around the house.  Cardiac stress test done suggestive of LAD ischemia. Referred for cardiac cath to further evaluate.  (10 Thom 2024 12:18)    S/P LHC revealing multi vessel disease       T(C): 37.1 (06-11-24 @ 07:24), Max: 37.1 (06-11-24 @ 07:24)  HR: 75 (06-11-24 @ 07:24) (75 - 75)  BP: 181/87 (06-11-24 @ 07:24) (181/87 - 181/87)  RR: 18 (06-11-24 @ 07:24) (18 - 18)  SpO2: 95% (06-11-24 @ 07:24) (95% - 95%)  Wt(kg): --        PHYSICAL EXAM:  NEURO: Non-focal, AxOx3.  No neuro deficits NECK: Supple, No JVD, No LAD  CHEST/LUNG: Clear to auscultation bilaterally; No wheeze  HEART: s1 s2 Regular rate and rhythm; No murmurs, rubs, or gallops  ABDOMEN: Soft, Nontender, Nondistended; Bowel sounds present X 4 quadrants   EXTREMITIES:  2+ Peripheral Pulses, No clubbing, cyanosis, or edema   VASCULAR: Peripheral pulses palpable 2+ bilaterally  PROCEDURE SITE: right band removed one hour post placement ,Site is without hematoma or bleeding. Sensation and NIYA intact. Distal pulses palpable 2+, capillary refill < 2 seconds. Patient denies pain, numbness, tingling, CP or SOB. Clean dry dressing applied     PROCEDURE RESULTS:  Full report to follow     ASSESSMENT: HPI:  70 y/o F with PMHx of HLD current smoking, family hx of early MI (mother passed at 64)  presented to cardiology after being seen in the ED for palpitations. CE neg x 2, in ED. Pt reports SOB with minimal activities such as walking around the house.  Cardiac stress test done suggestive of LAD ischemia. Referred for cardiac cath to further evaluate.  (10 Thom 2024 12:18)        PLAN:  -Admit to tele-  -Transfer to [facility] for CABG evaluation accepted by Dr. Richards  Post Hydration as per JOSEPH protocol.   Continue home medications  Smoking cessation education  Transfer consent signed and placed with chart.   -Planned discussed with Dr Funk

## 2024-06-11 NOTE — ASU DISCHARGE PLAN (ADULT/PEDIATRIC) - CARE PROVIDER_API CALL
Chaitanya Richards  Thoracic and Cardiac Surgery  [address] [place] [id number]  Phone: [phone]  Fax: [phone]  Follow Up Time:

## 2024-06-12 DIAGNOSIS — F17.200 NICOTINE DEPENDENCE, UNSPECIFIED, UNCOMPLICATED: ICD-10-CM

## 2024-06-12 DIAGNOSIS — R91.8 OTHER NONSPECIFIC ABNORMAL FINDING OF LUNG FIELD: ICD-10-CM

## 2024-06-12 DIAGNOSIS — Z78.9 OTHER SPECIFIED HEALTH STATUS: ICD-10-CM

## 2024-06-12 DIAGNOSIS — E78.5 HYPERLIPIDEMIA, UNSPECIFIED: ICD-10-CM

## 2024-06-12 DIAGNOSIS — Z01.811 ENCOUNTER FOR PREPROCEDURAL RESPIRATORY EXAMINATION: ICD-10-CM

## 2024-06-12 DIAGNOSIS — J44.9 CHRONIC OBSTRUCTIVE PULMONARY DISEASE, UNSPECIFIED: ICD-10-CM

## 2024-06-12 LAB
ANION GAP SERPL CALC-SCNC: 10 MMOL/L — SIGNIFICANT CHANGE UP (ref 5–17)
BUN SERPL-MCNC: 15.9 MG/DL — SIGNIFICANT CHANGE UP (ref 8–20)
CALCIUM SERPL-MCNC: 8.7 MG/DL — SIGNIFICANT CHANGE UP (ref 8.4–10.5)
CHLORIDE SERPL-SCNC: 103 MMOL/L — SIGNIFICANT CHANGE UP (ref 96–108)
CHOLEST SERPL-MCNC: 146 MG/DL — SIGNIFICANT CHANGE UP
CO2 SERPL-SCNC: 29 MMOL/L — SIGNIFICANT CHANGE UP (ref 22–29)
CORTIS AM PEAK SERPL-MCNC: 7.8 UG/DL — SIGNIFICANT CHANGE UP (ref 6–18.4)
CREAT SERPL-MCNC: 0.62 MG/DL — SIGNIFICANT CHANGE UP (ref 0.5–1.3)
EGFR: 97 ML/MIN/1.73M2 — SIGNIFICANT CHANGE UP
GLUCOSE SERPL-MCNC: 93 MG/DL — SIGNIFICANT CHANGE UP (ref 70–99)
HCT VFR BLD CALC: 47.3 % — HIGH (ref 34.5–45)
HDLC SERPL-MCNC: 65 MG/DL — SIGNIFICANT CHANGE UP
HGB BLD-MCNC: 15 G/DL — SIGNIFICANT CHANGE UP (ref 11.5–15.5)
LIPID PNL WITH DIRECT LDL SERPL: 67 MG/DL — SIGNIFICANT CHANGE UP
MAGNESIUM SERPL-MCNC: 2.1 MG/DL — SIGNIFICANT CHANGE UP (ref 1.6–2.6)
MCHC RBC-ENTMCNC: 30.2 PG — SIGNIFICANT CHANGE UP (ref 27–34)
MCHC RBC-ENTMCNC: 31.7 GM/DL — LOW (ref 32–36)
MCV RBC AUTO: 95.2 FL — SIGNIFICANT CHANGE UP (ref 80–100)
NON HDL CHOLESTEROL: 81 MG/DL — SIGNIFICANT CHANGE UP
PLATELET # BLD AUTO: 205 K/UL — SIGNIFICANT CHANGE UP (ref 150–400)
POTASSIUM SERPL-MCNC: 4.9 MMOL/L — SIGNIFICANT CHANGE UP (ref 3.5–5.3)
POTASSIUM SERPL-SCNC: 4.9 MMOL/L — SIGNIFICANT CHANGE UP (ref 3.5–5.3)
RBC # BLD: 4.97 M/UL — SIGNIFICANT CHANGE UP (ref 3.8–5.2)
RBC # FLD: 14 % — SIGNIFICANT CHANGE UP (ref 10.3–14.5)
SODIUM SERPL-SCNC: 142 MMOL/L — SIGNIFICANT CHANGE UP (ref 135–145)
TRIGL SERPL-MCNC: 69 MG/DL — SIGNIFICANT CHANGE UP
WBC # BLD: 8.94 K/UL — SIGNIFICANT CHANGE UP (ref 3.8–10.5)
WBC # FLD AUTO: 8.94 K/UL — SIGNIFICANT CHANGE UP (ref 3.8–10.5)

## 2024-06-12 PROCEDURE — 99223 1ST HOSP IP/OBS HIGH 75: CPT

## 2024-06-12 PROCEDURE — 71250 CT THORAX DX C-: CPT | Mod: 26

## 2024-06-12 PROCEDURE — 99232 SBSQ HOSP IP/OBS MODERATE 35: CPT

## 2024-06-12 RX ORDER — IPRATROPIUM BROMIDE AND ALBUTEROL SULFATE .5; 3 MG/3ML; MG/3ML
3 SOLUTION RESPIRATORY (INHALATION) EVERY 6 HOURS
Refills: 0 | Status: DISCONTINUED | OUTPATIENT
Start: 2024-06-12 | End: 2024-06-14

## 2024-06-12 RX ORDER — PREDNISONE 10 MG/1
30 TABLET ORAL DAILY
Refills: 0 | Status: COMPLETED | OUTPATIENT
Start: 2024-06-12 | End: 2024-06-14

## 2024-06-12 RX ADMIN — Medication 1 APPLICATION(S): at 17:12

## 2024-06-12 RX ADMIN — ENOXAPARIN SODIUM 40 MILLIGRAM(S): 100 INJECTION SUBCUTANEOUS at 11:04

## 2024-06-12 RX ADMIN — Medication 25 MILLIGRAM(S): at 18:03

## 2024-06-12 RX ADMIN — PREDNISONE 30 MILLIGRAM(S): 10 TABLET ORAL at 17:13

## 2024-06-12 RX ADMIN — Medication 25 MILLIGRAM(S): at 09:22

## 2024-06-12 RX ADMIN — Medication 3 MILLILITER(S): at 22:00

## 2024-06-12 RX ADMIN — Medication 15 MILLILITER(S): at 17:12

## 2024-06-12 RX ADMIN — ASPIRIN 81 MILLIGRAM(S): 325 TABLET, FILM COATED ORAL at 11:08

## 2024-06-12 RX ADMIN — Medication 100 MILLIGRAM(S): at 11:05

## 2024-06-12 RX ADMIN — Medication 1 APPLICATION(S): at 06:25

## 2024-06-12 RX ADMIN — NICOTINE POLACRILEX 1 PATCH: 2 LOZENGE ORAL at 19:00

## 2024-06-12 RX ADMIN — Medication 3 MILLILITER(S): at 13:37

## 2024-06-12 RX ADMIN — Medication 1 MILLIGRAM(S): at 11:04

## 2024-06-12 RX ADMIN — TIOTROPIUM BROMIDE 2 PUFF(S): 18 CAPSULE ORAL; RESPIRATORY (INHALATION) at 09:14

## 2024-06-12 RX ADMIN — Medication 3 MILLILITER(S): at 06:26

## 2024-06-12 RX ADMIN — Medication 2 PUFF(S): at 20:45

## 2024-06-12 RX ADMIN — NICOTINE POLACRILEX 1 PATCH: 2 LOZENGE ORAL at 11:05

## 2024-06-12 RX ADMIN — Medication 2 PUFF(S): at 09:15

## 2024-06-12 RX ADMIN — Medication 15 MILLILITER(S): at 06:25

## 2024-06-12 RX ADMIN — ATORVASTATIN CALCIUM 80 MILLIGRAM(S): 20 TABLET, FILM COATED ORAL at 21:35

## 2024-06-13 DIAGNOSIS — I25.10 ATHEROSCLEROTIC HEART DISEASE OF NATIVE CORONARY ARTERY WITHOUT ANGINA PECTORIS: ICD-10-CM

## 2024-06-13 DIAGNOSIS — R94.39 ABNORMAL RESULT OF OTHER CARDIOVASCULAR FUNCTION STUDY: ICD-10-CM

## 2024-06-13 LAB
ANION GAP SERPL CALC-SCNC: 9 MMOL/L — SIGNIFICANT CHANGE UP (ref 5–17)
BLD GP AB SCN SERPL QL: SIGNIFICANT CHANGE UP
BUN SERPL-MCNC: 17.7 MG/DL — SIGNIFICANT CHANGE UP (ref 8–20)
CALCIUM SERPL-MCNC: 9 MG/DL — SIGNIFICANT CHANGE UP (ref 8.4–10.5)
CHLORIDE SERPL-SCNC: 105 MMOL/L — SIGNIFICANT CHANGE UP (ref 96–108)
CO2 SERPL-SCNC: 28 MMOL/L — SIGNIFICANT CHANGE UP (ref 22–29)
CREAT SERPL-MCNC: 0.5 MG/DL — SIGNIFICANT CHANGE UP (ref 0.5–1.3)
EGFR: 102 ML/MIN/1.73M2 — SIGNIFICANT CHANGE UP
GLUCOSE SERPL-MCNC: 125 MG/DL — HIGH (ref 70–99)
HCT VFR BLD CALC: 45 % — SIGNIFICANT CHANGE UP (ref 34.5–45)
HGB BLD-MCNC: 14.6 G/DL — SIGNIFICANT CHANGE UP (ref 11.5–15.5)
MAGNESIUM SERPL-MCNC: 2 MG/DL — SIGNIFICANT CHANGE UP (ref 1.6–2.6)
MCHC RBC-ENTMCNC: 30.5 PG — SIGNIFICANT CHANGE UP (ref 27–34)
MCHC RBC-ENTMCNC: 32.4 GM/DL — SIGNIFICANT CHANGE UP (ref 32–36)
MCV RBC AUTO: 93.9 FL — SIGNIFICANT CHANGE UP (ref 80–100)
PLATELET # BLD AUTO: 193 K/UL — SIGNIFICANT CHANGE UP (ref 150–400)
POTASSIUM SERPL-MCNC: 4.9 MMOL/L — SIGNIFICANT CHANGE UP (ref 3.5–5.3)
POTASSIUM SERPL-SCNC: 4.9 MMOL/L — SIGNIFICANT CHANGE UP (ref 3.5–5.3)
RBC # BLD: 4.79 M/UL — SIGNIFICANT CHANGE UP (ref 3.8–5.2)
RBC # FLD: 13.9 % — SIGNIFICANT CHANGE UP (ref 10.3–14.5)
SODIUM SERPL-SCNC: 141 MMOL/L — SIGNIFICANT CHANGE UP (ref 135–145)
WBC # BLD: 7.82 K/UL — SIGNIFICANT CHANGE UP (ref 3.8–10.5)
WBC # FLD AUTO: 7.82 K/UL — SIGNIFICANT CHANGE UP (ref 3.8–10.5)

## 2024-06-13 PROCEDURE — 99231 SBSQ HOSP IP/OBS SF/LOW 25: CPT | Mod: 57

## 2024-06-13 PROCEDURE — 93010 ELECTROCARDIOGRAM REPORT: CPT

## 2024-06-13 RX ORDER — VANCOMYCIN HYDROCHLORIDE 50 MG/ML
1000 KIT ORAL ONCE
Refills: 0 | Status: COMPLETED | OUTPATIENT
Start: 2024-06-14 | End: 2024-06-14

## 2024-06-13 RX ORDER — METOPROLOL TARTRATE 50 MG
12.5 TABLET ORAL
Refills: 0 | Status: DISCONTINUED | OUTPATIENT
Start: 2024-06-13 | End: 2024-06-13

## 2024-06-13 RX ORDER — METOPROLOL TARTRATE 50 MG
2.5 TABLET ORAL ONCE
Refills: 0 | Status: COMPLETED | OUTPATIENT
Start: 2024-06-13 | End: 2024-06-13

## 2024-06-13 RX ORDER — METOPROLOL TARTRATE 50 MG
25 TABLET ORAL
Refills: 0 | Status: DISCONTINUED | OUTPATIENT
Start: 2024-06-13 | End: 2024-06-14

## 2024-06-13 RX ORDER — CEFUROXIME SODIUM 7.5 G
1500 VIAL (EA) INTRAVENOUS ONCE
Refills: 0 | Status: COMPLETED | OUTPATIENT
Start: 2024-06-14 | End: 2024-06-14

## 2024-06-13 RX ADMIN — ATORVASTATIN CALCIUM 80 MILLIGRAM(S): 20 TABLET, FILM COATED ORAL at 22:08

## 2024-06-13 RX ADMIN — Medication 1 MILLIGRAM(S): at 10:00

## 2024-06-13 RX ADMIN — Medication 1 APPLICATION(S): at 06:27

## 2024-06-13 RX ADMIN — Medication 3 MILLILITER(S): at 14:00

## 2024-06-13 RX ADMIN — Medication 2 PUFF(S): at 09:09

## 2024-06-13 RX ADMIN — Medication 3 MILLILITER(S): at 22:06

## 2024-06-13 RX ADMIN — Medication 100 MILLIGRAM(S): at 10:01

## 2024-06-13 RX ADMIN — ASPIRIN 81 MILLIGRAM(S): 325 TABLET, FILM COATED ORAL at 10:00

## 2024-06-13 RX ADMIN — IPRATROPIUM BROMIDE AND ALBUTEROL SULFATE 3 MILLILITER(S): .5; 3 SOLUTION RESPIRATORY (INHALATION) at 20:34

## 2024-06-13 RX ADMIN — Medication 25 MILLIGRAM(S): at 17:10

## 2024-06-13 RX ADMIN — NICOTINE POLACRILEX 1 PATCH: 2 LOZENGE ORAL at 22:08

## 2024-06-13 RX ADMIN — Medication 2.5 MILLIGRAM(S): at 14:23

## 2024-06-13 RX ADMIN — Medication 2.5 MILLIGRAM(S): at 14:54

## 2024-06-13 RX ADMIN — Medication 2 PUFF(S): at 20:34

## 2024-06-13 RX ADMIN — Medication 15 MILLILITER(S): at 17:10

## 2024-06-13 RX ADMIN — Medication 12.5 MILLIGRAM(S): at 09:59

## 2024-06-13 RX ADMIN — PREDNISONE 30 MILLIGRAM(S): 10 TABLET ORAL at 06:27

## 2024-06-13 RX ADMIN — Medication 1 APPLICATION(S): at 17:11

## 2024-06-13 RX ADMIN — Medication 3 MILLILITER(S): at 06:23

## 2024-06-13 RX ADMIN — Medication 15 MILLILITER(S): at 06:27

## 2024-06-13 RX ADMIN — ENOXAPARIN SODIUM 40 MILLIGRAM(S): 100 INJECTION SUBCUTANEOUS at 11:19

## 2024-06-13 RX ADMIN — IPRATROPIUM BROMIDE AND ALBUTEROL SULFATE 3 MILLILITER(S): .5; 3 SOLUTION RESPIRATORY (INHALATION) at 15:55

## 2024-06-13 RX ADMIN — TIOTROPIUM BROMIDE 2 PUFF(S): 18 CAPSULE ORAL; RESPIRATORY (INHALATION) at 09:10

## 2024-06-13 RX ADMIN — IPRATROPIUM BROMIDE AND ALBUTEROL SULFATE 3 MILLILITER(S): .5; 3 SOLUTION RESPIRATORY (INHALATION) at 09:12

## 2024-06-13 RX ADMIN — NICOTINE POLACRILEX 1 PATCH: 2 LOZENGE ORAL at 11:19

## 2024-06-14 ENCOUNTER — APPOINTMENT (OUTPATIENT)
Dept: CARDIOTHORACIC SURGERY | Facility: HOSPITAL | Age: 69
End: 2024-06-14

## 2024-06-14 ENCOUNTER — RESULT REVIEW (OUTPATIENT)
Age: 69
End: 2024-06-14

## 2024-06-14 LAB
ALBUMIN SERPL ELPH-MCNC: 2.7 G/DL — LOW (ref 3.3–5.2)
ALP SERPL-CCNC: 33 U/L — LOW (ref 40–120)
ALT FLD-CCNC: 13 U/L — SIGNIFICANT CHANGE UP
ANION GAP SERPL CALC-SCNC: 11 MMOL/L — SIGNIFICANT CHANGE UP (ref 5–17)
ANION GAP SERPL CALC-SCNC: 14 MMOL/L — SIGNIFICANT CHANGE UP (ref 5–17)
APTT BLD: 26.6 SEC — SIGNIFICANT CHANGE UP (ref 24.5–35.6)
AST SERPL-CCNC: 35 U/L — HIGH
BASOPHILS # BLD AUTO: 0.05 K/UL — SIGNIFICANT CHANGE UP (ref 0–0.2)
BASOPHILS NFR BLD AUTO: 0.2 % — SIGNIFICANT CHANGE UP (ref 0–2)
BILIRUB SERPL-MCNC: 0.6 MG/DL — SIGNIFICANT CHANGE UP (ref 0.4–2)
BUN SERPL-MCNC: 13.2 MG/DL — SIGNIFICANT CHANGE UP (ref 8–20)
BUN SERPL-MCNC: 18.6 MG/DL — SIGNIFICANT CHANGE UP (ref 8–20)
CALCIUM SERPL-MCNC: 7.7 MG/DL — LOW (ref 8.4–10.5)
CALCIUM SERPL-MCNC: 9.2 MG/DL — SIGNIFICANT CHANGE UP (ref 8.4–10.5)
CHLORIDE SERPL-SCNC: 107 MMOL/L — SIGNIFICANT CHANGE UP (ref 96–108)
CHLORIDE SERPL-SCNC: 98 MMOL/L — SIGNIFICANT CHANGE UP (ref 96–108)
CK MB CFR SERPL CALC: 36.2 NG/ML — HIGH (ref 0–6.7)
CK SERPL-CCNC: 322 U/L — HIGH (ref 25–170)
CO2 SERPL-SCNC: 23 MMOL/L — SIGNIFICANT CHANGE UP (ref 22–29)
CO2 SERPL-SCNC: 28 MMOL/L — SIGNIFICANT CHANGE UP (ref 22–29)
CREAT SERPL-MCNC: 0.4 MG/DL — LOW (ref 0.5–1.3)
CREAT SERPL-MCNC: 0.62 MG/DL — SIGNIFICANT CHANGE UP (ref 0.5–1.3)
EGFR: 108 ML/MIN/1.73M2 — SIGNIFICANT CHANGE UP
EGFR: 97 ML/MIN/1.73M2 — SIGNIFICANT CHANGE UP
EOSINOPHIL # BLD AUTO: 0.03 K/UL — SIGNIFICANT CHANGE UP (ref 0–0.5)
EOSINOPHIL NFR BLD AUTO: 0.1 % — SIGNIFICANT CHANGE UP (ref 0–6)
FIBRINOGEN PPP-MCNC: 239 MG/DL — SIGNIFICANT CHANGE UP (ref 200–450)
GAS PNL BLDA: SIGNIFICANT CHANGE UP
GLUCOSE BLDC GLUCOMTR-MCNC: 101 MG/DL — HIGH (ref 70–99)
GLUCOSE BLDC GLUCOMTR-MCNC: 102 MG/DL — HIGH (ref 70–99)
GLUCOSE BLDC GLUCOMTR-MCNC: 116 MG/DL — HIGH (ref 70–99)
GLUCOSE SERPL-MCNC: 160 MG/DL — HIGH (ref 70–99)
GLUCOSE SERPL-MCNC: 82 MG/DL — SIGNIFICANT CHANGE UP (ref 70–99)
HCT VFR BLD CALC: 33.2 % — LOW (ref 34.5–45)
HCT VFR BLD CALC: 46.9 % — HIGH (ref 34.5–45)
HGB BLD-MCNC: 11.2 G/DL — LOW (ref 11.5–15.5)
HGB BLD-MCNC: 14.8 G/DL — SIGNIFICANT CHANGE UP (ref 11.5–15.5)
IMM GRANULOCYTES NFR BLD AUTO: 1.3 % — HIGH (ref 0–0.9)
INR BLD: 1.2 RATIO — HIGH (ref 0.85–1.18)
LYMPHOCYTES # BLD AUTO: 1.61 K/UL — SIGNIFICANT CHANGE UP (ref 1–3.3)
LYMPHOCYTES # BLD AUTO: 6.7 % — LOW (ref 13–44)
MAGNESIUM SERPL-MCNC: 1.8 MG/DL — SIGNIFICANT CHANGE UP (ref 1.6–2.6)
MAGNESIUM SERPL-MCNC: 2.9 MG/DL — HIGH (ref 1.6–2.6)
MCHC RBC-ENTMCNC: 30.1 PG — SIGNIFICANT CHANGE UP (ref 27–34)
MCHC RBC-ENTMCNC: 31.5 PG — SIGNIFICANT CHANGE UP (ref 27–34)
MCHC RBC-ENTMCNC: 31.6 GM/DL — LOW (ref 32–36)
MCHC RBC-ENTMCNC: 33.7 GM/DL — SIGNIFICANT CHANGE UP (ref 32–36)
MCV RBC AUTO: 93.3 FL — SIGNIFICANT CHANGE UP (ref 80–100)
MCV RBC AUTO: 95.3 FL — SIGNIFICANT CHANGE UP (ref 80–100)
MONOCYTES # BLD AUTO: 1.26 K/UL — HIGH (ref 0–0.9)
MONOCYTES NFR BLD AUTO: 5.2 % — SIGNIFICANT CHANGE UP (ref 2–14)
NEUTROPHILS # BLD AUTO: 20.83 K/UL — HIGH (ref 1.8–7.4)
NEUTROPHILS NFR BLD AUTO: 86.5 % — HIGH (ref 43–77)
PLATELET # BLD AUTO: 183 K/UL — SIGNIFICANT CHANGE UP (ref 150–400)
PLATELET # BLD AUTO: 185 K/UL — SIGNIFICANT CHANGE UP (ref 150–400)
POTASSIUM SERPL-MCNC: 3.5 MMOL/L — SIGNIFICANT CHANGE UP (ref 3.5–5.3)
POTASSIUM SERPL-MCNC: 4.7 MMOL/L — SIGNIFICANT CHANGE UP (ref 3.5–5.3)
POTASSIUM SERPL-SCNC: 3.5 MMOL/L — SIGNIFICANT CHANGE UP (ref 3.5–5.3)
POTASSIUM SERPL-SCNC: 4.7 MMOL/L — SIGNIFICANT CHANGE UP (ref 3.5–5.3)
PROT SERPL-MCNC: 4.4 G/DL — LOW (ref 6.6–8.7)
PROTHROM AB SERPL-ACNC: 13.3 SEC — HIGH (ref 9.5–13)
RBC # BLD: 3.56 M/UL — LOW (ref 3.8–5.2)
RBC # BLD: 4.92 M/UL — SIGNIFICANT CHANGE UP (ref 3.8–5.2)
RBC # FLD: 14 % — SIGNIFICANT CHANGE UP (ref 10.3–14.5)
RBC # FLD: 14.5 % — SIGNIFICANT CHANGE UP (ref 10.3–14.5)
SODIUM SERPL-SCNC: 140 MMOL/L — SIGNIFICANT CHANGE UP (ref 135–145)
SODIUM SERPL-SCNC: 141 MMOL/L — SIGNIFICANT CHANGE UP (ref 135–145)
TROPONIN T, HIGH SENSITIVITY RESULT: 604 NG/L — HIGH (ref 0–51)
WBC # BLD: 15.52 K/UL — HIGH (ref 3.8–10.5)
WBC # BLD: 24.09 K/UL — HIGH (ref 3.8–10.5)
WBC # FLD AUTO: 15.52 K/UL — HIGH (ref 3.8–10.5)
WBC # FLD AUTO: 24.09 K/UL — HIGH (ref 3.8–10.5)

## 2024-06-14 PROCEDURE — 33512 CABG VEIN THREE: CPT

## 2024-06-14 PROCEDURE — 33533 CABG ARTERIAL SINGLE: CPT | Mod: AS

## 2024-06-14 PROCEDURE — 99024 POSTOP FOLLOW-UP VISIT: CPT

## 2024-06-14 PROCEDURE — 33533 CABG ARTERIAL SINGLE: CPT

## 2024-06-14 PROCEDURE — 33512 CABG VEIN THREE: CPT | Mod: AS

## 2024-06-14 PROCEDURE — 99291 CRITICAL CARE FIRST HOUR: CPT

## 2024-06-14 PROCEDURE — 76998 US GUIDE INTRAOP: CPT | Mod: 26,NC,AS,59

## 2024-06-14 PROCEDURE — 76998 US GUIDE INTRAOP: CPT | Mod: 26,59

## 2024-06-14 PROCEDURE — 93010 ELECTROCARDIOGRAM REPORT: CPT

## 2024-06-14 PROCEDURE — 33508 ENDOSCOPIC VEIN HARVEST: CPT | Mod: 59

## 2024-06-14 PROCEDURE — 71045 X-RAY EXAM CHEST 1 VIEW: CPT | Mod: 26,76

## 2024-06-14 DEVICE — BIOGLUE 5ML SYR: Type: IMPLANTABLE DEVICE | Status: FUNCTIONAL

## 2024-06-14 DEVICE — CANNULA VENOUS 2 STAGE OPEN LIGHTHOUSE TIP 32-40FR X 1/2" NON-VENTED: Type: IMPLANTABLE DEVICE | Status: FUNCTIONAL

## 2024-06-14 DEVICE — FLOSEAL WITH RECOTHROM THROMBIN 10ML: Type: IMPLANTABLE DEVICE | Status: FUNCTIONAL

## 2024-06-14 DEVICE — CANNULA ATRASUMP 1/4" X 38CM: Type: IMPLANTABLE DEVICE | Status: FUNCTIONAL

## 2024-06-14 DEVICE — SURGICEL FIBRILLAR 4 X 4": Type: IMPLANTABLE DEVICE | Status: FUNCTIONAL

## 2024-06-14 DEVICE — CHEST DRAIN THORACIC ARGYLE PVC 28FR RIGHT ANGLE: Type: IMPLANTABLE DEVICE | Status: FUNCTIONAL

## 2024-06-14 DEVICE — CANNULA ARTERIAL SOFT-FLOW 21FR EXTENDED VENTED: Type: IMPLANTABLE DEVICE | Status: FUNCTIONAL

## 2024-06-14 DEVICE — PACING WIRE WHITE M-25 WINGED WIRE 37MM X 89MM: Type: IMPLANTABLE DEVICE | Status: FUNCTIONAL

## 2024-06-14 DEVICE — KIT A-LINE 1LUM 20G X 12CM SAFE KIT: Type: IMPLANTABLE DEVICE | Status: FUNCTIONAL

## 2024-06-14 DEVICE — CANNULA ANTEGRADE CARDIOPLEGIA 14 GA STRL: Type: IMPLANTABLE DEVICE | Status: FUNCTIONAL

## 2024-06-14 DEVICE — KIT CVC 2LUM MAC 9FR CHG: Type: IMPLANTABLE DEVICE | Status: FUNCTIONAL

## 2024-06-14 DEVICE — CANNULA RETROGRADE CARDIOPLEGIA SELF-INFLATING 14FR PRE-SHAPED STYLET/HANDLE: Type: IMPLANTABLE DEVICE | Status: FUNCTIONAL

## 2024-06-14 DEVICE — OCCLUDER INTERNAL VESSEL FLO-RESTER 1 X 12MM: Type: IMPLANTABLE DEVICE | Status: FUNCTIONAL

## 2024-06-14 DEVICE — CANNULA VESSEL 3MM BLUNT TIP CLEAR 1-WAY VALVE: Type: IMPLANTABLE DEVICE | Status: FUNCTIONAL

## 2024-06-14 DEVICE — CATH INFUS SL 7FR 20CM: Type: IMPLANTABLE DEVICE | Status: FUNCTIONAL

## 2024-06-14 DEVICE — PACING WIRE ORANGE M-25 WINGED WIRE 37MM X 89MM: Type: IMPLANTABLE DEVICE | Status: FUNCTIONAL

## 2024-06-14 RX ORDER — ALBUMIN HUMAN 5 %
250 INTRAVENOUS SOLUTION INTRAVENOUS ONCE
Refills: 0 | Status: COMPLETED | OUTPATIENT
Start: 2024-06-14 | End: 2024-06-14

## 2024-06-14 RX ORDER — OXYCODONE HYDROCHLORIDE 100 MG/5ML
10 SOLUTION ORAL EVERY 4 HOURS
Refills: 0 | Status: DISCONTINUED | OUTPATIENT
Start: 2024-06-14 | End: 2024-06-19

## 2024-06-14 RX ORDER — PROPOFOL 10 MG/ML
10 INJECTION, EMULSION INTRAVENOUS
Qty: 1000 | Refills: 0 | Status: DISCONTINUED | OUTPATIENT
Start: 2024-06-14 | End: 2024-06-14

## 2024-06-14 RX ORDER — GABAPENTIN
100 POWDER (GRAM) MISCELLANEOUS EVERY 8 HOURS
Refills: 0 | Status: DISCONTINUED | OUTPATIENT
Start: 2024-06-14 | End: 2024-06-19

## 2024-06-14 RX ORDER — SODIUM CHLORIDE 0.9 % (FLUSH) 0.9 %
1000 SYRINGE (ML) INJECTION
Refills: 0 | Status: ACTIVE | OUTPATIENT
Start: 2024-06-14 | End: 2025-05-13

## 2024-06-14 RX ORDER — SENNOSIDES 8.6 MG
2 TABLET ORAL AT BEDTIME
Refills: 0 | Status: DISCONTINUED | OUTPATIENT
Start: 2024-06-15 | End: 2024-06-19

## 2024-06-14 RX ORDER — POLYETHYLENE GLYCOL 3350 1 G/G
17 POWDER ORAL DAILY
Refills: 0 | Status: DISCONTINUED | OUTPATIENT
Start: 2024-06-15 | End: 2024-06-19

## 2024-06-14 RX ORDER — DEXTROSE 30 % IN WATER 30 %
50 VIAL (ML) INTRAVENOUS
Refills: 0 | Status: DISCONTINUED | OUTPATIENT
Start: 2024-06-14 | End: 2024-06-14

## 2024-06-14 RX ORDER — NOREPINEPHRINE BITARTRATE 1 MG/ML
0.05 INJECTION INTRAVENOUS
Qty: 8 | Refills: 0 | Status: DISCONTINUED | OUTPATIENT
Start: 2024-06-14 | End: 2024-06-15

## 2024-06-14 RX ORDER — ASPIRIN 325 MG/1
324 TABLET, FILM COATED ORAL ONCE
Refills: 0 | Status: COMPLETED | OUTPATIENT
Start: 2024-06-14 | End: 2024-06-14

## 2024-06-14 RX ORDER — HYDROMORPHONE HCL 0.2 MG/ML
0.5 INJECTION, SOLUTION INTRAVENOUS EVERY 6 HOURS
Refills: 0 | Status: DISCONTINUED | OUTPATIENT
Start: 2024-06-14 | End: 2024-06-16

## 2024-06-14 RX ORDER — DEXTROSE MONOHYDRATE AND SODIUM CHLORIDE 5; .3 G/100ML; G/100ML
500 INJECTION, SOLUTION INTRAVENOUS ONCE
Refills: 0 | Status: COMPLETED | OUTPATIENT
Start: 2024-06-14 | End: 2024-06-14

## 2024-06-14 RX ORDER — PANTOPRAZOLE SODIUM 40 MG/10ML
40 INJECTION, POWDER, FOR SOLUTION INTRAVENOUS ONCE
Refills: 0 | Status: COMPLETED | OUTPATIENT
Start: 2024-06-14 | End: 2024-06-14

## 2024-06-14 RX ORDER — VANCOMYCIN HYDROCHLORIDE 50 MG/ML
1000 KIT ORAL EVERY 12 HOURS
Refills: 0 | Status: COMPLETED | OUTPATIENT
Start: 2024-06-14 | End: 2024-06-16

## 2024-06-14 RX ORDER — PANTOPRAZOLE SODIUM 40 MG/10ML
40 INJECTION, POWDER, FOR SOLUTION INTRAVENOUS DAILY
Refills: 0 | Status: DISCONTINUED | OUTPATIENT
Start: 2024-06-15 | End: 2024-06-19

## 2024-06-14 RX ORDER — ACETAMINOPHEN 325 MG
1000 TABLET ORAL EVERY 6 HOURS
Refills: 0 | Status: COMPLETED | OUTPATIENT
Start: 2024-06-14 | End: 2024-06-15

## 2024-06-14 RX ORDER — OXYCODONE HYDROCHLORIDE 100 MG/5ML
5 SOLUTION ORAL EVERY 4 HOURS
Refills: 0 | Status: DISCONTINUED | OUTPATIENT
Start: 2024-06-14 | End: 2024-06-19

## 2024-06-14 RX ORDER — INSULIN REGULAR, HUMAN 100/ML
2 VIAL (ML) INJECTION
Qty: 100 | Refills: 0 | Status: DISCONTINUED | OUTPATIENT
Start: 2024-06-14 | End: 2024-06-15

## 2024-06-14 RX ORDER — MEPERIDINE HYDROCHLORIDE 50 MG/1
25 TABLET ORAL ONCE
Refills: 0 | Status: DISCONTINUED | OUTPATIENT
Start: 2024-06-14 | End: 2024-06-14

## 2024-06-14 RX ORDER — ACETAMINOPHEN 325 MG
650 TABLET ORAL EVERY 6 HOURS
Refills: 0 | Status: COMPLETED | OUTPATIENT
Start: 2024-06-17 | End: 2025-05-16

## 2024-06-14 RX ORDER — CEFUROXIME SODIUM 7.5 G
1500 VIAL (EA) INTRAVENOUS EVERY 8 HOURS
Refills: 0 | Status: COMPLETED | OUTPATIENT
Start: 2024-06-14 | End: 2024-06-16

## 2024-06-14 RX ORDER — DEXTROSE 30 % IN WATER 30 %
25 VIAL (ML) INTRAVENOUS
Refills: 0 | Status: DISCONTINUED | OUTPATIENT
Start: 2024-06-14 | End: 2024-06-14

## 2024-06-14 RX ORDER — IPRATROPIUM BROMIDE AND ALBUTEROL SULFATE .5; 3 MG/3ML; MG/3ML
3 SOLUTION RESPIRATORY (INHALATION) EVERY 6 HOURS
Refills: 0 | Status: DISCONTINUED | OUTPATIENT
Start: 2024-06-14 | End: 2024-06-18

## 2024-06-14 RX ORDER — ASPIRIN 325 MG/1
325 TABLET, FILM COATED ORAL DAILY
Refills: 0 | Status: DISCONTINUED | OUTPATIENT
Start: 2024-06-15 | End: 2024-06-19

## 2024-06-14 RX ORDER — BISACODYL 5 MG
10 TABLET, DELAYED RELEASE (ENTERIC COATED) ORAL ONCE
Refills: 0 | Status: DISCONTINUED | OUTPATIENT
Start: 2024-06-16 | End: 2024-06-19

## 2024-06-14 RX ORDER — AMIODARONE HYDROCHLORIDE 50 MG/ML
400 INJECTION, SOLUTION INTRAVENOUS
Refills: 0 | Status: COMPLETED | OUTPATIENT
Start: 2024-06-14 | End: 2024-06-17

## 2024-06-14 RX ORDER — DEXAMETHASONE 1 MG/1
4 TABLET ORAL EVERY 8 HOURS
Refills: 0 | Status: COMPLETED | OUTPATIENT
Start: 2024-06-14 | End: 2024-06-15

## 2024-06-14 RX ORDER — ACETAMINOPHEN 325 MG
650 TABLET ORAL EVERY 6 HOURS
Refills: 0 | Status: COMPLETED | OUTPATIENT
Start: 2024-06-15 | End: 2024-06-16

## 2024-06-14 RX ADMIN — DEXTROSE MONOHYDRATE AND SODIUM CHLORIDE 1000 MILLILITER(S): 5; .3 INJECTION, SOLUTION INTRAVENOUS at 17:00

## 2024-06-14 RX ADMIN — ASPIRIN 324 MILLIGRAM(S): 325 TABLET, FILM COATED ORAL at 19:55

## 2024-06-14 RX ADMIN — DEXAMETHASONE 4 MILLIGRAM(S): 1 TABLET ORAL at 21:14

## 2024-06-14 RX ADMIN — Medication 400 MILLIGRAM(S): at 23:02

## 2024-06-14 RX ADMIN — Medication 1000 MILLIGRAM(S): at 19:29

## 2024-06-14 RX ADMIN — Medication 3 MILLILITER(S): at 05:06

## 2024-06-14 RX ADMIN — VANCOMYCIN HYDROCHLORIDE 250 MILLIGRAM(S): KIT at 19:37

## 2024-06-14 RX ADMIN — Medication 400 MILLIGRAM(S): at 17:00

## 2024-06-14 RX ADMIN — PANTOPRAZOLE SODIUM 40 MILLIGRAM(S): 40 INJECTION, POWDER, FOR SOLUTION INTRAVENOUS at 19:46

## 2024-06-14 RX ADMIN — Medication 125 MILLILITER(S): at 15:35

## 2024-06-14 RX ADMIN — IPRATROPIUM BROMIDE AND ALBUTEROL SULFATE 3 MILLILITER(S): .5; 3 SOLUTION RESPIRATORY (INHALATION) at 03:37

## 2024-06-14 RX ADMIN — Medication 100 MILLIGRAM(S): at 16:00

## 2024-06-14 RX ADMIN — PREDNISONE 30 MILLIGRAM(S): 10 TABLET ORAL at 05:06

## 2024-06-14 RX ADMIN — IPRATROPIUM BROMIDE AND ALBUTEROL SULFATE 3 MILLILITER(S): .5; 3 SOLUTION RESPIRATORY (INHALATION) at 20:53

## 2024-06-14 RX ADMIN — Medication 25 MILLIGRAM(S): at 05:06

## 2024-06-14 RX ADMIN — Medication 100 MILLIGRAM(S): at 23:02

## 2024-06-14 RX ADMIN — Medication 2 UNIT(S)/HR: at 18:21

## 2024-06-14 RX ADMIN — Medication 125 MILLILITER(S): at 16:35

## 2024-06-14 RX ADMIN — Medication 15 MILLILITER(S): at 05:06

## 2024-06-15 DIAGNOSIS — R09.02 HYPOXEMIA: ICD-10-CM

## 2024-06-15 LAB
ALBUMIN SERPL ELPH-MCNC: 3.3 G/DL — SIGNIFICANT CHANGE UP (ref 3.3–5.2)
ALP SERPL-CCNC: 28 U/L — LOW (ref 40–120)
ALT FLD-CCNC: 14 U/L — SIGNIFICANT CHANGE UP
ANION GAP SERPL CALC-SCNC: 14 MMOL/L — SIGNIFICANT CHANGE UP (ref 5–17)
APTT BLD: 25.9 SEC — SIGNIFICANT CHANGE UP (ref 24.5–35.6)
AST SERPL-CCNC: 42 U/L — HIGH
BASOPHILS # BLD AUTO: 0.01 K/UL — SIGNIFICANT CHANGE UP (ref 0–0.2)
BASOPHILS NFR BLD AUTO: 0.1 % — SIGNIFICANT CHANGE UP (ref 0–2)
BILIRUB SERPL-MCNC: 0.6 MG/DL — SIGNIFICANT CHANGE UP (ref 0.4–2)
BUN SERPL-MCNC: 14 MG/DL — SIGNIFICANT CHANGE UP (ref 8–20)
CALCIUM SERPL-MCNC: 7.7 MG/DL — LOW (ref 8.4–10.5)
CHLORIDE SERPL-SCNC: 104 MMOL/L — SIGNIFICANT CHANGE UP (ref 96–108)
CK MB CFR SERPL CALC: 40.7 NG/ML — HIGH (ref 0–6.7)
CK SERPL-CCNC: 594 U/L — HIGH (ref 25–170)
CO2 SERPL-SCNC: 22 MMOL/L — SIGNIFICANT CHANGE UP (ref 22–29)
CREAT SERPL-MCNC: 0.53 MG/DL — SIGNIFICANT CHANGE UP (ref 0.5–1.3)
EGFR: 101 ML/MIN/1.73M2 — SIGNIFICANT CHANGE UP
EOSINOPHIL # BLD AUTO: 0 K/UL — SIGNIFICANT CHANGE UP (ref 0–0.5)
EOSINOPHIL NFR BLD AUTO: 0 % — SIGNIFICANT CHANGE UP (ref 0–6)
GAS PNL BLDA: SIGNIFICANT CHANGE UP
GLUCOSE BLDC GLUCOMTR-MCNC: 123 MG/DL — HIGH (ref 70–99)
GLUCOSE BLDC GLUCOMTR-MCNC: 125 MG/DL — HIGH (ref 70–99)
GLUCOSE BLDC GLUCOMTR-MCNC: 126 MG/DL — HIGH (ref 70–99)
GLUCOSE BLDC GLUCOMTR-MCNC: 126 MG/DL — HIGH (ref 70–99)
GLUCOSE BLDC GLUCOMTR-MCNC: 127 MG/DL — HIGH (ref 70–99)
GLUCOSE BLDC GLUCOMTR-MCNC: 134 MG/DL — HIGH (ref 70–99)
GLUCOSE BLDC GLUCOMTR-MCNC: 144 MG/DL — HIGH (ref 70–99)
GLUCOSE BLDC GLUCOMTR-MCNC: 152 MG/DL — HIGH (ref 70–99)
GLUCOSE BLDC GLUCOMTR-MCNC: 163 MG/DL — HIGH (ref 70–99)
GLUCOSE BLDC GLUCOMTR-MCNC: 180 MG/DL — HIGH (ref 70–99)
GLUCOSE BLDC GLUCOMTR-MCNC: 185 MG/DL — HIGH (ref 70–99)
GLUCOSE BLDC GLUCOMTR-MCNC: 190 MG/DL — HIGH (ref 70–99)
GLUCOSE BLDC GLUCOMTR-MCNC: 196 MG/DL — HIGH (ref 70–99)
GLUCOSE BLDC GLUCOMTR-MCNC: 255 MG/DL — HIGH (ref 70–99)
GLUCOSE SERPL-MCNC: 114 MG/DL — HIGH (ref 70–99)
HCT VFR BLD CALC: 29.9 % — LOW (ref 34.5–45)
HGB BLD-MCNC: 9.6 G/DL — LOW (ref 11.5–15.5)
IMM GRANULOCYTES NFR BLD AUTO: 0.4 % — SIGNIFICANT CHANGE UP (ref 0–0.9)
INR BLD: 1.07 RATIO — SIGNIFICANT CHANGE UP (ref 0.85–1.18)
LYMPHOCYTES # BLD AUTO: 0.76 K/UL — LOW (ref 1–3.3)
LYMPHOCYTES # BLD AUTO: 6.1 % — LOW (ref 13–44)
MCHC RBC-ENTMCNC: 30.5 PG — SIGNIFICANT CHANGE UP (ref 27–34)
MCHC RBC-ENTMCNC: 32.1 GM/DL — SIGNIFICANT CHANGE UP (ref 32–36)
MCV RBC AUTO: 94.9 FL — SIGNIFICANT CHANGE UP (ref 80–100)
MONOCYTES # BLD AUTO: 0.62 K/UL — SIGNIFICANT CHANGE UP (ref 0–0.9)
MONOCYTES NFR BLD AUTO: 4.9 % — SIGNIFICANT CHANGE UP (ref 2–14)
NEUTROPHILS # BLD AUTO: 11.11 K/UL — HIGH (ref 1.8–7.4)
NEUTROPHILS NFR BLD AUTO: 88.5 % — HIGH (ref 43–77)
PLATELET # BLD AUTO: 149 K/UL — LOW (ref 150–400)
POTASSIUM SERPL-MCNC: 4.8 MMOL/L — SIGNIFICANT CHANGE UP (ref 3.5–5.3)
POTASSIUM SERPL-SCNC: 4.8 MMOL/L — SIGNIFICANT CHANGE UP (ref 3.5–5.3)
PROT SERPL-MCNC: 4.8 G/DL — LOW (ref 6.6–8.7)
PROTHROM AB SERPL-ACNC: 11.9 SEC — SIGNIFICANT CHANGE UP (ref 9.5–13)
RBC # BLD: 3.15 M/UL — LOW (ref 3.8–5.2)
RBC # FLD: 15.2 % — HIGH (ref 10.3–14.5)
SODIUM SERPL-SCNC: 140 MMOL/L — SIGNIFICANT CHANGE UP (ref 135–145)
TROPONIN T, HIGH SENSITIVITY RESULT: 641 NG/L — HIGH (ref 0–51)
WBC # BLD: 12.55 K/UL — HIGH (ref 3.8–10.5)
WBC # FLD AUTO: 12.55 K/UL — HIGH (ref 3.8–10.5)

## 2024-06-15 PROCEDURE — 99291 CRITICAL CARE FIRST HOUR: CPT | Mod: 24

## 2024-06-15 PROCEDURE — 99024 POSTOP FOLLOW-UP VISIT: CPT

## 2024-06-15 PROCEDURE — 71045 X-RAY EXAM CHEST 1 VIEW: CPT | Mod: 26

## 2024-06-15 PROCEDURE — 36620 INSERTION CATHETER ARTERY: CPT | Mod: RT

## 2024-06-15 PROCEDURE — 99233 SBSQ HOSP IP/OBS HIGH 50: CPT

## 2024-06-15 PROCEDURE — 93010 ELECTROCARDIOGRAM REPORT: CPT

## 2024-06-15 RX ORDER — GLUCAGON HYDROCHLORIDE 1 MG/ML
1 INJECTION, POWDER, FOR SOLUTION INTRAMUSCULAR; INTRAVENOUS; SUBCUTANEOUS ONCE
Refills: 0 | Status: DISCONTINUED | OUTPATIENT
Start: 2024-06-15 | End: 2024-06-19

## 2024-06-15 RX ORDER — KETOROLAC TROMETHAMINE 30 MG/ML
30 INJECTION, SOLUTION INTRAMUSCULAR ONCE
Refills: 0 | Status: DISCONTINUED | OUTPATIENT
Start: 2024-06-15 | End: 2024-06-15

## 2024-06-15 RX ORDER — INSULIN LISPRO 100 [IU]/ML
INJECTION, SOLUTION SUBCUTANEOUS
Refills: 0 | Status: DISCONTINUED | OUTPATIENT
Start: 2024-06-15 | End: 2024-06-17

## 2024-06-15 RX ORDER — INSULIN LISPRO 100 [IU]/ML
2 INJECTION, SOLUTION SUBCUTANEOUS
Refills: 0 | Status: DISCONTINUED | OUTPATIENT
Start: 2024-06-15 | End: 2024-06-15

## 2024-06-15 RX ORDER — FOLIC ACID
1 POWDER (GRAM) MISCELLANEOUS DAILY
Refills: 0 | Status: DISCONTINUED | OUTPATIENT
Start: 2024-06-15 | End: 2024-06-19

## 2024-06-15 RX ORDER — DEXTROSE MONOHYDRATE AND SODIUM CHLORIDE 5; .3 G/100ML; G/100ML
1000 INJECTION, SOLUTION INTRAVENOUS
Refills: 0 | Status: DISCONTINUED | OUTPATIENT
Start: 2024-06-15 | End: 2024-06-17

## 2024-06-15 RX ORDER — ENOXAPARIN SODIUM 100 MG/ML
40 INJECTION SUBCUTANEOUS EVERY 24 HOURS
Refills: 0 | Status: DISCONTINUED | OUTPATIENT
Start: 2024-06-15 | End: 2024-06-19

## 2024-06-15 RX ORDER — ALBUMIN HUMAN 5 %
250 INTRAVENOUS SOLUTION INTRAVENOUS ONCE
Refills: 0 | Status: COMPLETED | OUTPATIENT
Start: 2024-06-15 | End: 2024-06-15

## 2024-06-15 RX ORDER — DEXTROSE 30 % IN WATER 30 %
12.5 VIAL (ML) INTRAVENOUS ONCE
Refills: 0 | Status: DISCONTINUED | OUTPATIENT
Start: 2024-06-15 | End: 2024-06-17

## 2024-06-15 RX ORDER — FUROSEMIDE 10 MG/ML
40 INJECTION, SOLUTION INTRAMUSCULAR; INTRAVENOUS ONCE
Refills: 0 | Status: COMPLETED | OUTPATIENT
Start: 2024-06-15 | End: 2024-06-15

## 2024-06-15 RX ORDER — DEXTROSE 30 % IN WATER 30 %
15 VIAL (ML) INTRAVENOUS ONCE
Refills: 0 | Status: DISCONTINUED | OUTPATIENT
Start: 2024-06-15 | End: 2024-06-17

## 2024-06-15 RX ORDER — MAGNESIUM SULFATE 100 %
2 POWDER (GRAM) MISCELLANEOUS ONCE
Refills: 0 | Status: COMPLETED | OUTPATIENT
Start: 2024-06-15 | End: 2024-06-15

## 2024-06-15 RX ORDER — TIOTROPIUM BROMIDE 18 UG/1
2 CAPSULE ORAL; RESPIRATORY (INHALATION) DAILY
Refills: 0 | Status: DISCONTINUED | OUTPATIENT
Start: 2024-06-15 | End: 2024-06-15

## 2024-06-15 RX ORDER — ALBUMIN HUMAN 5 %
250 INTRAVENOUS SOLUTION INTRAVENOUS ONCE
Refills: 0 | Status: DISCONTINUED | OUTPATIENT
Start: 2024-06-15 | End: 2024-06-15

## 2024-06-15 RX ORDER — BUDESONIDE/FORMOTEROL FUMARATE 160-4.5MCG
2 HFA AEROSOL WITH ADAPTER (GRAM) INHALATION
Refills: 0 | Status: DISCONTINUED | OUTPATIENT
Start: 2024-06-15 | End: 2024-06-15

## 2024-06-15 RX ORDER — DEXTROSE 30 % IN WATER 30 %
25 VIAL (ML) INTRAVENOUS ONCE
Refills: 0 | Status: DISCONTINUED | OUTPATIENT
Start: 2024-06-15 | End: 2024-06-17

## 2024-06-15 RX ORDER — ATORVASTATIN CALCIUM 20 MG/1
80 TABLET, FILM COATED ORAL AT BEDTIME
Refills: 0 | Status: DISCONTINUED | OUTPATIENT
Start: 2024-06-15 | End: 2024-06-19

## 2024-06-15 RX ORDER — DEXTROSE MONOHYDRATE 100 MG/ML
125 INJECTION, SOLUTION INTRAVENOUS ONCE
Refills: 0 | Status: DISCONTINUED | OUTPATIENT
Start: 2024-06-15 | End: 2024-06-17

## 2024-06-15 RX ORDER — BUDESONIDE 0.25 MG/2ML
0.5 INHALANT ORAL
Refills: 0 | Status: DISCONTINUED | OUTPATIENT
Start: 2024-06-15 | End: 2024-06-18

## 2024-06-15 RX ORDER — THIAMINE HCL 100 MG
100 TABLET ORAL DAILY
Refills: 0 | Status: DISCONTINUED | OUTPATIENT
Start: 2024-06-15 | End: 2024-06-19

## 2024-06-15 RX ORDER — METOPROLOL TARTRATE 50 MG
25 TABLET ORAL
Refills: 0 | Status: DISCONTINUED | OUTPATIENT
Start: 2024-06-15 | End: 2024-06-19

## 2024-06-15 RX ADMIN — Medication 2 PUFF(S): at 08:31

## 2024-06-15 RX ADMIN — ASPIRIN 325 MILLIGRAM(S): 325 TABLET, FILM COATED ORAL at 11:47

## 2024-06-15 RX ADMIN — Medication 400 MILLIGRAM(S): at 11:48

## 2024-06-15 RX ADMIN — Medication 100 MILLIGRAM(S): at 18:27

## 2024-06-15 RX ADMIN — Medication 100 MILLIGRAM(S): at 11:48

## 2024-06-15 RX ADMIN — Medication 500 MILLIGRAM(S): at 05:55

## 2024-06-15 RX ADMIN — Medication 400 MILLIGRAM(S): at 05:55

## 2024-06-15 RX ADMIN — TIOTROPIUM BROMIDE 2 PUFF(S): 18 CAPSULE ORAL; RESPIRATORY (INHALATION) at 08:31

## 2024-06-15 RX ADMIN — KETOROLAC TROMETHAMINE 30 MILLIGRAM(S): 30 INJECTION, SOLUTION INTRAMUSCULAR at 03:45

## 2024-06-15 RX ADMIN — AMIODARONE HYDROCHLORIDE 400 MILLIGRAM(S): 50 INJECTION, SOLUTION INTRAVENOUS at 05:55

## 2024-06-15 RX ADMIN — INSULIN LISPRO 2 UNIT(S): 100 INJECTION, SOLUTION SUBCUTANEOUS at 11:46

## 2024-06-15 RX ADMIN — Medication 100 MILLIGRAM(S): at 05:55

## 2024-06-15 RX ADMIN — KETOROLAC TROMETHAMINE 30 MILLIGRAM(S): 30 INJECTION, SOLUTION INTRAMUSCULAR at 04:45

## 2024-06-15 RX ADMIN — DEXAMETHASONE 4 MILLIGRAM(S): 1 TABLET ORAL at 05:55

## 2024-06-15 RX ADMIN — Medication 500 MILLIGRAM(S): at 18:22

## 2024-06-15 RX ADMIN — IPRATROPIUM BROMIDE AND ALBUTEROL SULFATE 3 MILLILITER(S): .5; 3 SOLUTION RESPIRATORY (INHALATION) at 08:30

## 2024-06-15 RX ADMIN — Medication 100 MILLIGRAM(S): at 10:41

## 2024-06-15 RX ADMIN — Medication 25 GRAM(S): at 18:27

## 2024-06-15 RX ADMIN — VANCOMYCIN HYDROCHLORIDE 250 MILLIGRAM(S): KIT at 10:41

## 2024-06-15 RX ADMIN — DEXAMETHASONE 4 MILLIGRAM(S): 1 TABLET ORAL at 15:22

## 2024-06-15 RX ADMIN — Medication 100 MILLIGRAM(S): at 15:22

## 2024-06-15 RX ADMIN — Medication 1 APPLICATION(S): at 11:48

## 2024-06-15 RX ADMIN — INSULIN LISPRO 2 UNIT(S): 100 INJECTION, SOLUTION SUBCUTANEOUS at 16:30

## 2024-06-15 RX ADMIN — AMIODARONE HYDROCHLORIDE 400 MILLIGRAM(S): 50 INJECTION, SOLUTION INTRAVENOUS at 18:22

## 2024-06-15 RX ADMIN — Medication 125 MILLILITER(S): at 02:59

## 2024-06-15 RX ADMIN — FUROSEMIDE 40 MILLIGRAM(S): 10 INJECTION, SOLUTION INTRAMUSCULAR; INTRAVENOUS at 18:40

## 2024-06-15 RX ADMIN — Medication 1 MILLIGRAM(S): at 11:47

## 2024-06-15 RX ADMIN — Medication 650 MILLIGRAM(S): at 18:22

## 2024-06-15 RX ADMIN — PANTOPRAZOLE SODIUM 40 MILLIGRAM(S): 40 INJECTION, POWDER, FOR SOLUTION INTRAVENOUS at 11:48

## 2024-06-15 RX ADMIN — IPRATROPIUM BROMIDE AND ALBUTEROL SULFATE 3 MILLILITER(S): .5; 3 SOLUTION RESPIRATORY (INHALATION) at 02:13

## 2024-06-15 RX ADMIN — Medication 25 MILLIGRAM(S): at 18:23

## 2024-06-15 RX ADMIN — IPRATROPIUM BROMIDE AND ALBUTEROL SULFATE 3 MILLILITER(S): .5; 3 SOLUTION RESPIRATORY (INHALATION) at 15:17

## 2024-06-15 RX ADMIN — ENOXAPARIN SODIUM 40 MILLIGRAM(S): 100 INJECTION SUBCUTANEOUS at 11:48

## 2024-06-16 LAB
ANION GAP SERPL CALC-SCNC: 11 MMOL/L — SIGNIFICANT CHANGE UP (ref 5–17)
APTT BLD: 25.1 SEC — SIGNIFICANT CHANGE UP (ref 24.5–35.6)
BUN SERPL-MCNC: 19.2 MG/DL — SIGNIFICANT CHANGE UP (ref 8–20)
CALCIUM SERPL-MCNC: 7.8 MG/DL — LOW (ref 8.4–10.5)
CHLORIDE SERPL-SCNC: 103 MMOL/L — SIGNIFICANT CHANGE UP (ref 96–108)
CO2 SERPL-SCNC: 23 MMOL/L — SIGNIFICANT CHANGE UP (ref 22–29)
CREAT SERPL-MCNC: 0.71 MG/DL — SIGNIFICANT CHANGE UP (ref 0.5–1.3)
EGFR: 93 ML/MIN/1.73M2 — SIGNIFICANT CHANGE UP
GLUCOSE BLDC GLUCOMTR-MCNC: 111 MG/DL — HIGH (ref 70–99)
GLUCOSE BLDC GLUCOMTR-MCNC: 117 MG/DL — HIGH (ref 70–99)
GLUCOSE BLDC GLUCOMTR-MCNC: 122 MG/DL — HIGH (ref 70–99)
GLUCOSE BLDC GLUCOMTR-MCNC: 136 MG/DL — HIGH (ref 70–99)
GLUCOSE SERPL-MCNC: 109 MG/DL — HIGH (ref 70–99)
HCT VFR BLD CALC: 31.1 % — LOW (ref 34.5–45)
HGB BLD-MCNC: 10 G/DL — LOW (ref 11.5–15.5)
INR BLD: 0.97 RATIO — SIGNIFICANT CHANGE UP (ref 0.85–1.18)
MAGNESIUM SERPL-MCNC: 2.7 MG/DL — HIGH (ref 1.6–2.6)
MCHC RBC-ENTMCNC: 30.7 PG — SIGNIFICANT CHANGE UP (ref 27–34)
MCHC RBC-ENTMCNC: 32.2 GM/DL — SIGNIFICANT CHANGE UP (ref 32–36)
MCV RBC AUTO: 95.4 FL — SIGNIFICANT CHANGE UP (ref 80–100)
PHOSPHATE SERPL-MCNC: 3.5 MG/DL — SIGNIFICANT CHANGE UP (ref 2.4–4.7)
PLATELET # BLD AUTO: 156 K/UL — SIGNIFICANT CHANGE UP (ref 150–400)
POTASSIUM SERPL-MCNC: 4.2 MMOL/L — SIGNIFICANT CHANGE UP (ref 3.5–5.3)
POTASSIUM SERPL-SCNC: 4.2 MMOL/L — SIGNIFICANT CHANGE UP (ref 3.5–5.3)
PROTHROM AB SERPL-ACNC: 10.8 SEC — SIGNIFICANT CHANGE UP (ref 9.5–13)
RBC # BLD: 3.26 M/UL — LOW (ref 3.8–5.2)
RBC # FLD: 15 % — HIGH (ref 10.3–14.5)
SODIUM SERPL-SCNC: 137 MMOL/L — SIGNIFICANT CHANGE UP (ref 135–145)
WBC # BLD: 14.4 K/UL — HIGH (ref 3.8–10.5)
WBC # FLD AUTO: 14.4 K/UL — HIGH (ref 3.8–10.5)

## 2024-06-16 PROCEDURE — 99233 SBSQ HOSP IP/OBS HIGH 50: CPT

## 2024-06-16 PROCEDURE — 71045 X-RAY EXAM CHEST 1 VIEW: CPT | Mod: 26

## 2024-06-16 PROCEDURE — 99024 POSTOP FOLLOW-UP VISIT: CPT

## 2024-06-16 PROCEDURE — 93010 ELECTROCARDIOGRAM REPORT: CPT

## 2024-06-16 RX ORDER — SODIUM CHLORIDE 0.9 % (FLUSH) 0.9 %
3 SYRINGE (ML) INJECTION EVERY 8 HOURS
Refills: 0 | Status: DISCONTINUED | OUTPATIENT
Start: 2024-06-16 | End: 2024-06-17

## 2024-06-16 RX ADMIN — ENOXAPARIN SODIUM 40 MILLIGRAM(S): 100 INJECTION SUBCUTANEOUS at 11:31

## 2024-06-16 RX ADMIN — Medication 500 MILLIGRAM(S): at 05:16

## 2024-06-16 RX ADMIN — Medication 1 APPLICATION(S): at 11:43

## 2024-06-16 RX ADMIN — Medication 100 MILLIGRAM(S): at 05:16

## 2024-06-16 RX ADMIN — Medication 650 MILLIGRAM(S): at 11:32

## 2024-06-16 RX ADMIN — Medication 100 MILLIGRAM(S): at 22:15

## 2024-06-16 RX ADMIN — BUDESONIDE 0.5 MILLIGRAM(S): 0.25 INHALANT ORAL at 20:40

## 2024-06-16 RX ADMIN — POLYETHYLENE GLYCOL 3350 17 GRAM(S): 1 POWDER ORAL at 11:33

## 2024-06-16 RX ADMIN — IPRATROPIUM BROMIDE AND ALBUTEROL SULFATE 3 MILLILITER(S): .5; 3 SOLUTION RESPIRATORY (INHALATION) at 15:11

## 2024-06-16 RX ADMIN — Medication 2 TABLET(S): at 22:15

## 2024-06-16 RX ADMIN — Medication 15 MILLILITER(S): at 17:08

## 2024-06-16 RX ADMIN — Medication 1 MILLIGRAM(S): at 11:32

## 2024-06-16 RX ADMIN — Medication 25 MILLIGRAM(S): at 05:16

## 2024-06-16 RX ADMIN — ASPIRIN 325 MILLIGRAM(S): 325 TABLET, FILM COATED ORAL at 11:32

## 2024-06-16 RX ADMIN — IPRATROPIUM BROMIDE AND ALBUTEROL SULFATE 3 MILLILITER(S): .5; 3 SOLUTION RESPIRATORY (INHALATION) at 20:39

## 2024-06-16 RX ADMIN — Medication 100 MILLIGRAM(S): at 11:34

## 2024-06-16 RX ADMIN — PANTOPRAZOLE SODIUM 40 MILLIGRAM(S): 40 INJECTION, POWDER, FOR SOLUTION INTRAVENOUS at 11:32

## 2024-06-16 RX ADMIN — Medication 650 MILLIGRAM(S): at 05:16

## 2024-06-16 RX ADMIN — Medication 100 MILLIGRAM(S): at 14:44

## 2024-06-16 RX ADMIN — Medication 100 MILLIGRAM(S): at 00:05

## 2024-06-16 RX ADMIN — VANCOMYCIN HYDROCHLORIDE 250 MILLIGRAM(S): KIT at 08:20

## 2024-06-16 RX ADMIN — ATORVASTATIN CALCIUM 80 MILLIGRAM(S): 20 TABLET, FILM COATED ORAL at 22:15

## 2024-06-16 RX ADMIN — AMIODARONE HYDROCHLORIDE 400 MILLIGRAM(S): 50 INJECTION, SOLUTION INTRAVENOUS at 17:06

## 2024-06-16 RX ADMIN — Medication 3 MILLILITER(S): at 14:02

## 2024-06-16 RX ADMIN — Medication 650 MILLIGRAM(S): at 17:06

## 2024-06-16 RX ADMIN — Medication 500 MILLIGRAM(S): at 17:06

## 2024-06-16 RX ADMIN — Medication 3 MILLILITER(S): at 06:18

## 2024-06-16 RX ADMIN — Medication 3 MILLILITER(S): at 22:18

## 2024-06-16 RX ADMIN — BUDESONIDE 0.5 MILLIGRAM(S): 0.25 INHALANT ORAL at 08:46

## 2024-06-16 RX ADMIN — IPRATROPIUM BROMIDE AND ALBUTEROL SULFATE 3 MILLILITER(S): .5; 3 SOLUTION RESPIRATORY (INHALATION) at 08:46

## 2024-06-16 RX ADMIN — Medication 25 MILLIGRAM(S): at 17:07

## 2024-06-17 LAB
ANION GAP SERPL CALC-SCNC: 10 MMOL/L — SIGNIFICANT CHANGE UP (ref 5–17)
BUN SERPL-MCNC: 27.5 MG/DL — HIGH (ref 8–20)
CALCIUM SERPL-MCNC: 8.2 MG/DL — LOW (ref 8.4–10.5)
CHLORIDE SERPL-SCNC: 102 MMOL/L — SIGNIFICANT CHANGE UP (ref 96–108)
CO2 SERPL-SCNC: 27 MMOL/L — SIGNIFICANT CHANGE UP (ref 22–29)
CREAT SERPL-MCNC: 0.77 MG/DL — SIGNIFICANT CHANGE UP (ref 0.5–1.3)
EGFR: 84 ML/MIN/1.73M2 — SIGNIFICANT CHANGE UP
GLUCOSE BLDC GLUCOMTR-MCNC: 103 MG/DL — HIGH (ref 70–99)
GLUCOSE BLDC GLUCOMTR-MCNC: 126 MG/DL — HIGH (ref 70–99)
GLUCOSE SERPL-MCNC: 109 MG/DL — HIGH (ref 70–99)
HCT VFR BLD CALC: 31.5 % — LOW (ref 34.5–45)
HGB BLD-MCNC: 10.2 G/DL — LOW (ref 11.5–15.5)
MAGNESIUM SERPL-MCNC: 2.4 MG/DL — SIGNIFICANT CHANGE UP (ref 1.6–2.6)
MCHC RBC-ENTMCNC: 30.7 PG — SIGNIFICANT CHANGE UP (ref 27–34)
MCHC RBC-ENTMCNC: 32.4 GM/DL — SIGNIFICANT CHANGE UP (ref 32–36)
MCV RBC AUTO: 94.9 FL — SIGNIFICANT CHANGE UP (ref 80–100)
PLATELET # BLD AUTO: 180 K/UL — SIGNIFICANT CHANGE UP (ref 150–400)
POTASSIUM SERPL-MCNC: 4.1 MMOL/L — SIGNIFICANT CHANGE UP (ref 3.5–5.3)
POTASSIUM SERPL-SCNC: 4.1 MMOL/L — SIGNIFICANT CHANGE UP (ref 3.5–5.3)
RBC # BLD: 3.32 M/UL — LOW (ref 3.8–5.2)
RBC # FLD: 14.9 % — HIGH (ref 10.3–14.5)
SODIUM SERPL-SCNC: 139 MMOL/L — SIGNIFICANT CHANGE UP (ref 135–145)
WBC # BLD: 13.18 K/UL — HIGH (ref 3.8–10.5)
WBC # FLD AUTO: 13.18 K/UL — HIGH (ref 3.8–10.5)

## 2024-06-17 PROCEDURE — 99024 POSTOP FOLLOW-UP VISIT: CPT

## 2024-06-17 PROCEDURE — 71045 X-RAY EXAM CHEST 1 VIEW: CPT | Mod: 26

## 2024-06-17 PROCEDURE — 99291 CRITICAL CARE FIRST HOUR: CPT

## 2024-06-17 PROCEDURE — 93010 ELECTROCARDIOGRAM REPORT: CPT

## 2024-06-17 RX ORDER — NICOTINE POLACRILEX 2 MG/1
1 LOZENGE ORAL DAILY
Refills: 0 | Status: DISCONTINUED | OUTPATIENT
Start: 2024-06-17 | End: 2024-06-19

## 2024-06-17 RX ORDER — FUROSEMIDE 10 MG/ML
40 INJECTION, SOLUTION INTRAMUSCULAR; INTRAVENOUS DAILY
Refills: 0 | Status: DISCONTINUED | OUTPATIENT
Start: 2024-06-17 | End: 2024-06-19

## 2024-06-17 RX ADMIN — IPRATROPIUM BROMIDE AND ALBUTEROL SULFATE 3 MILLILITER(S): .5; 3 SOLUTION RESPIRATORY (INHALATION) at 08:18

## 2024-06-17 RX ADMIN — IPRATROPIUM BROMIDE AND ALBUTEROL SULFATE 3 MILLILITER(S): .5; 3 SOLUTION RESPIRATORY (INHALATION) at 14:58

## 2024-06-17 RX ADMIN — Medication 100 MILLIGRAM(S): at 21:35

## 2024-06-17 RX ADMIN — Medication 500 MILLIGRAM(S): at 05:26

## 2024-06-17 RX ADMIN — FUROSEMIDE 40 MILLIGRAM(S): 10 INJECTION, SOLUTION INTRAMUSCULAR; INTRAVENOUS at 13:44

## 2024-06-17 RX ADMIN — Medication 100 MILLIGRAM(S): at 13:45

## 2024-06-17 RX ADMIN — BUDESONIDE 0.5 MILLIGRAM(S): 0.25 INHALANT ORAL at 20:53

## 2024-06-17 RX ADMIN — Medication 25 MILLIGRAM(S): at 17:18

## 2024-06-17 RX ADMIN — Medication 100 MILLIGRAM(S): at 05:26

## 2024-06-17 RX ADMIN — NICOTINE POLACRILEX 1 PATCH: 2 LOZENGE ORAL at 13:44

## 2024-06-17 RX ADMIN — IPRATROPIUM BROMIDE AND ALBUTEROL SULFATE 3 MILLILITER(S): .5; 3 SOLUTION RESPIRATORY (INHALATION) at 20:53

## 2024-06-17 RX ADMIN — IPRATROPIUM BROMIDE AND ALBUTEROL SULFATE 3 MILLILITER(S): .5; 3 SOLUTION RESPIRATORY (INHALATION) at 03:20

## 2024-06-17 RX ADMIN — ATORVASTATIN CALCIUM 80 MILLIGRAM(S): 20 TABLET, FILM COATED ORAL at 21:34

## 2024-06-17 RX ADMIN — Medication 25 MILLIGRAM(S): at 05:27

## 2024-06-17 RX ADMIN — Medication 100 MILLIGRAM(S): at 13:48

## 2024-06-17 RX ADMIN — PANTOPRAZOLE SODIUM 40 MILLIGRAM(S): 40 INJECTION, POWDER, FOR SOLUTION INTRAVENOUS at 13:45

## 2024-06-17 RX ADMIN — POLYETHYLENE GLYCOL 3350 17 GRAM(S): 1 POWDER ORAL at 13:54

## 2024-06-17 RX ADMIN — Medication 3 MILLILITER(S): at 06:57

## 2024-06-17 RX ADMIN — Medication 500 MILLIGRAM(S): at 17:18

## 2024-06-17 RX ADMIN — ENOXAPARIN SODIUM 40 MILLIGRAM(S): 100 INJECTION SUBCUTANEOUS at 21:35

## 2024-06-17 RX ADMIN — AMIODARONE HYDROCHLORIDE 400 MILLIGRAM(S): 50 INJECTION, SOLUTION INTRAVENOUS at 05:26

## 2024-06-17 RX ADMIN — Medication 2 TABLET(S): at 21:35

## 2024-06-17 RX ADMIN — BUDESONIDE 0.5 MILLIGRAM(S): 0.25 INHALANT ORAL at 08:18

## 2024-06-17 RX ADMIN — ASPIRIN 325 MILLIGRAM(S): 325 TABLET, FILM COATED ORAL at 13:46

## 2024-06-17 RX ADMIN — Medication 1 MILLIGRAM(S): at 13:44

## 2024-06-18 ENCOUNTER — TRANSCRIPTION ENCOUNTER (OUTPATIENT)
Age: 69
End: 2024-06-18

## 2024-06-18 LAB
ANION GAP SERPL CALC-SCNC: 12 MMOL/L — SIGNIFICANT CHANGE UP (ref 5–17)
BUN SERPL-MCNC: 18 MG/DL — SIGNIFICANT CHANGE UP (ref 8–20)
CALCIUM SERPL-MCNC: 8.2 MG/DL — LOW (ref 8.4–10.5)
CHLORIDE SERPL-SCNC: 98 MMOL/L — SIGNIFICANT CHANGE UP (ref 96–108)
CO2 SERPL-SCNC: 28 MMOL/L — SIGNIFICANT CHANGE UP (ref 22–29)
CREAT SERPL-MCNC: 0.58 MG/DL — SIGNIFICANT CHANGE UP (ref 0.5–1.3)
EGFR: 99 ML/MIN/1.73M2 — SIGNIFICANT CHANGE UP
GLUCOSE SERPL-MCNC: 104 MG/DL — HIGH (ref 70–99)
HCT VFR BLD CALC: 35.3 % — SIGNIFICANT CHANGE UP (ref 34.5–45)
HGB BLD-MCNC: 11.6 G/DL — SIGNIFICANT CHANGE UP (ref 11.5–15.5)
MAGNESIUM SERPL-MCNC: 1.9 MG/DL — SIGNIFICANT CHANGE UP (ref 1.6–2.6)
MCHC RBC-ENTMCNC: 30.9 PG — SIGNIFICANT CHANGE UP (ref 27–34)
MCHC RBC-ENTMCNC: 32.9 GM/DL — SIGNIFICANT CHANGE UP (ref 32–36)
MCV RBC AUTO: 93.9 FL — SIGNIFICANT CHANGE UP (ref 80–100)
PLATELET # BLD AUTO: 198 K/UL — SIGNIFICANT CHANGE UP (ref 150–400)
POTASSIUM SERPL-MCNC: 4.1 MMOL/L — SIGNIFICANT CHANGE UP (ref 3.5–5.3)
POTASSIUM SERPL-SCNC: 4.1 MMOL/L — SIGNIFICANT CHANGE UP (ref 3.5–5.3)
RBC # BLD: 3.76 M/UL — LOW (ref 3.8–5.2)
RBC # FLD: 14.7 % — HIGH (ref 10.3–14.5)
SODIUM SERPL-SCNC: 138 MMOL/L — SIGNIFICANT CHANGE UP (ref 135–145)
WBC # BLD: 11.63 K/UL — HIGH (ref 3.8–10.5)
WBC # FLD AUTO: 11.63 K/UL — HIGH (ref 3.8–10.5)

## 2024-06-18 PROCEDURE — 99233 SBSQ HOSP IP/OBS HIGH 50: CPT

## 2024-06-18 PROCEDURE — 71045 X-RAY EXAM CHEST 1 VIEW: CPT | Mod: 26

## 2024-06-18 PROCEDURE — 99024 POSTOP FOLLOW-UP VISIT: CPT

## 2024-06-18 RX ORDER — LACTULOSE 10 G/15ML
10 SOLUTION ORAL
Refills: 0 | Status: DISCONTINUED | OUTPATIENT
Start: 2024-06-18 | End: 2024-06-19

## 2024-06-18 RX ORDER — TIOTROPIUM BROMIDE 18 UG/1
2 CAPSULE ORAL; RESPIRATORY (INHALATION) DAILY
Refills: 0 | Status: DISCONTINUED | OUTPATIENT
Start: 2024-06-18 | End: 2024-06-19

## 2024-06-18 RX ORDER — BUDESONIDE/FORMOTEROL FUMARATE 160-4.5MCG
2 HFA AEROSOL WITH ADAPTER (GRAM) INHALATION
Refills: 0 | Status: DISCONTINUED | OUTPATIENT
Start: 2024-06-18 | End: 2024-06-19

## 2024-06-18 RX ORDER — ACETAMINOPHEN 325 MG
650 TABLET ORAL EVERY 6 HOURS
Refills: 0 | Status: DISCONTINUED | OUTPATIENT
Start: 2024-06-18 | End: 2024-06-19

## 2024-06-18 RX ORDER — GABAPENTIN
100 POWDER (GRAM) MISCELLANEOUS ONCE
Refills: 0 | Status: DISCONTINUED | OUTPATIENT
Start: 2024-06-19 | End: 2024-06-19

## 2024-06-18 RX ORDER — MAGNESIUM SULFATE 100 %
1 POWDER (GRAM) MISCELLANEOUS ONCE
Refills: 0 | Status: COMPLETED | OUTPATIENT
Start: 2024-06-18 | End: 2024-06-18

## 2024-06-18 RX ADMIN — NICOTINE POLACRILEX 1 PATCH: 2 LOZENGE ORAL at 11:31

## 2024-06-18 RX ADMIN — Medication 25 MILLIGRAM(S): at 17:31

## 2024-06-18 RX ADMIN — PANTOPRAZOLE SODIUM 40 MILLIGRAM(S): 40 INJECTION, POWDER, FOR SOLUTION INTRAVENOUS at 11:34

## 2024-06-18 RX ADMIN — Medication 500 MILLIGRAM(S): at 17:31

## 2024-06-18 RX ADMIN — Medication 100 GRAM(S): at 06:31

## 2024-06-18 RX ADMIN — ATORVASTATIN CALCIUM 80 MILLIGRAM(S): 20 TABLET, FILM COATED ORAL at 21:12

## 2024-06-18 RX ADMIN — BUDESONIDE 0.5 MILLIGRAM(S): 0.25 INHALANT ORAL at 09:01

## 2024-06-18 RX ADMIN — OXYCODONE HYDROCHLORIDE 5 MILLIGRAM(S): 100 SOLUTION ORAL at 21:13

## 2024-06-18 RX ADMIN — LACTULOSE 10 GRAM(S): 10 SOLUTION ORAL at 11:45

## 2024-06-18 RX ADMIN — OXYCODONE HYDROCHLORIDE 5 MILLIGRAM(S): 100 SOLUTION ORAL at 05:15

## 2024-06-18 RX ADMIN — ASPIRIN 325 MILLIGRAM(S): 325 TABLET, FILM COATED ORAL at 11:34

## 2024-06-18 RX ADMIN — Medication 100 MILLIGRAM(S): at 21:12

## 2024-06-18 RX ADMIN — ENOXAPARIN SODIUM 40 MILLIGRAM(S): 100 INJECTION SUBCUTANEOUS at 11:33

## 2024-06-18 RX ADMIN — NICOTINE POLACRILEX 1 PATCH: 2 LOZENGE ORAL at 11:32

## 2024-06-18 RX ADMIN — POLYETHYLENE GLYCOL 3350 17 GRAM(S): 1 POWDER ORAL at 11:34

## 2024-06-18 RX ADMIN — FUROSEMIDE 40 MILLIGRAM(S): 10 INJECTION, SOLUTION INTRAMUSCULAR; INTRAVENOUS at 05:11

## 2024-06-18 RX ADMIN — IPRATROPIUM BROMIDE AND ALBUTEROL SULFATE 3 MILLILITER(S): .5; 3 SOLUTION RESPIRATORY (INHALATION) at 14:38

## 2024-06-18 RX ADMIN — Medication 1 MILLIGRAM(S): at 11:35

## 2024-06-18 RX ADMIN — Medication 25 MILLIGRAM(S): at 05:11

## 2024-06-18 RX ADMIN — Medication 2 TABLET(S): at 21:13

## 2024-06-18 RX ADMIN — IPRATROPIUM BROMIDE AND ALBUTEROL SULFATE 3 MILLILITER(S): .5; 3 SOLUTION RESPIRATORY (INHALATION) at 09:01

## 2024-06-18 RX ADMIN — Medication 100 MILLIGRAM(S): at 14:10

## 2024-06-18 RX ADMIN — Medication 500 MILLIGRAM(S): at 05:11

## 2024-06-18 RX ADMIN — Medication 100 MILLIGRAM(S): at 11:35

## 2024-06-18 RX ADMIN — Medication 100 MILLIGRAM(S): at 05:11

## 2024-06-19 ENCOUNTER — TRANSCRIPTION ENCOUNTER (OUTPATIENT)
Age: 69
End: 2024-06-19

## 2024-06-19 VITALS
DIASTOLIC BLOOD PRESSURE: 66 MMHG | RESPIRATION RATE: 18 BRPM | HEART RATE: 69 BPM | OXYGEN SATURATION: 98 % | TEMPERATURE: 98 F | SYSTOLIC BLOOD PRESSURE: 121 MMHG

## 2024-06-19 LAB
ANION GAP SERPL CALC-SCNC: 9 MMOL/L — SIGNIFICANT CHANGE UP (ref 5–17)
BASE EXCESS BLDA CALC-SCNC: -2 MMOL/L — SIGNIFICANT CHANGE UP (ref -2–3)
BASE EXCESS BLDA CALC-SCNC: -2.7 MMOL/L — LOW (ref -2–3)
BASE EXCESS BLDA CALC-SCNC: -8.2 MMOL/L — LOW (ref -2–3)
BASE EXCESS BLDA CALC-SCNC: 1 MMOL/L — SIGNIFICANT CHANGE UP (ref -2–3)
BASE EXCESS BLDA CALC-SCNC: 1.1 MMOL/L — SIGNIFICANT CHANGE UP (ref -2–3)
BASE EXCESS BLDA CALC-SCNC: 1.4 MMOL/L — SIGNIFICANT CHANGE UP (ref -2–3)
BASE EXCESS BLDA CALC-SCNC: 2.6 MMOL/L — SIGNIFICANT CHANGE UP (ref -2–3)
BASE EXCESS BLDA CALC-SCNC: 7.7 MMOL/L — HIGH (ref -2–3)
BASE EXCESS BLDV CALC-SCNC: 0.9 MMOL/L — SIGNIFICANT CHANGE UP (ref -2–3)
BASE EXCESS BLDV CALC-SCNC: 2.4 MMOL/L — SIGNIFICANT CHANGE UP (ref -2–3)
BASE EXCESS BLDV CALC-SCNC: 2.5 MMOL/L — SIGNIFICANT CHANGE UP (ref -2–3)
BASE EXCESS BLDV CALC-SCNC: 2.8 MMOL/L — SIGNIFICANT CHANGE UP (ref -2–3)
BASE EXCESS BLDV CALC-SCNC: 4.8 MMOL/L — HIGH (ref -2–3)
BUN SERPL-MCNC: 15 MG/DL — SIGNIFICANT CHANGE UP (ref 8–20)
CA-I BLDA-SCNC: 0.79 MMOL/L — LOW (ref 1.15–1.33)
CA-I BLDA-SCNC: 0.95 MMOL/L — LOW (ref 1.15–1.33)
CA-I BLDA-SCNC: 0.99 MMOL/L — LOW (ref 1.15–1.33)
CA-I BLDA-SCNC: 1 MMOL/L — LOW (ref 1.15–1.33)
CA-I BLDA-SCNC: 1.01 MMOL/L — LOW (ref 1.15–1.33)
CA-I BLDA-SCNC: 1.19 MMOL/L — SIGNIFICANT CHANGE UP (ref 1.15–1.33)
CA-I BLDA-SCNC: 1.19 MMOL/L — SIGNIFICANT CHANGE UP (ref 1.15–1.33)
CA-I BLDA-SCNC: 1.44 MMOL/L — HIGH (ref 1.15–1.33)
CA-I SERPL-SCNC: 0.96 MMOL/L — LOW (ref 1.15–1.33)
CA-I SERPL-SCNC: 0.98 MMOL/L — LOW (ref 1.15–1.33)
CA-I SERPL-SCNC: 0.98 MMOL/L — LOW (ref 1.15–1.33)
CA-I SERPL-SCNC: 1 MMOL/L — LOW (ref 1.15–1.33)
CA-I SERPL-SCNC: 1.2 MMOL/L — SIGNIFICANT CHANGE UP (ref 1.15–1.33)
CALCIUM SERPL-MCNC: 8.2 MG/DL — LOW (ref 8.4–10.5)
CHLORIDE BLDA-SCNC: 103 MMOL/L — SIGNIFICANT CHANGE UP (ref 96–108)
CHLORIDE BLDA-SCNC: 104 MMOL/L — SIGNIFICANT CHANGE UP (ref 96–108)
CHLORIDE BLDA-SCNC: 106 MMOL/L — SIGNIFICANT CHANGE UP (ref 96–108)
CHLORIDE BLDA-SCNC: 108 MMOL/L — SIGNIFICANT CHANGE UP (ref 96–108)
CHLORIDE BLDA-SCNC: 110 MMOL/L — HIGH (ref 96–108)
CHLORIDE BLDA-SCNC: 117 MMOL/L — HIGH (ref 96–108)
CHLORIDE BLDV-SCNC: 101 MMOL/L — SIGNIFICANT CHANGE UP (ref 96–108)
CHLORIDE BLDV-SCNC: 103 MMOL/L — SIGNIFICANT CHANGE UP (ref 96–108)
CHLORIDE BLDV-SCNC: 105 MMOL/L — SIGNIFICANT CHANGE UP (ref 96–108)
CHLORIDE BLDV-SCNC: 105 MMOL/L — SIGNIFICANT CHANGE UP (ref 96–108)
CHLORIDE BLDV-SCNC: 106 MMOL/L — SIGNIFICANT CHANGE UP (ref 96–108)
CHLORIDE SERPL-SCNC: 97 MMOL/L — SIGNIFICANT CHANGE UP (ref 96–108)
CO2 SERPL-SCNC: 31 MMOL/L — HIGH (ref 22–29)
COHGB MFR BLDA: 0.6 % — SIGNIFICANT CHANGE UP
COHGB MFR BLDA: 0.9 % — SIGNIFICANT CHANGE UP
COHGB MFR BLDA: 1.1 % — SIGNIFICANT CHANGE UP
COHGB MFR BLDA: 1.1 % — SIGNIFICANT CHANGE UP
COHGB MFR BLDA: 1.2 % — SIGNIFICANT CHANGE UP
COHGB MFR BLDA: 1.3 % — SIGNIFICANT CHANGE UP
COHGB MFR BLDA: 1.8 % — SIGNIFICANT CHANGE UP
COHGB MFR BLDA: 2.3 % — SIGNIFICANT CHANGE UP
COHGB MFR BLDV: 1.6 % — SIGNIFICANT CHANGE UP
COHGB MFR BLDV: 1.8 % — SIGNIFICANT CHANGE UP
COHGB MFR BLDV: 1.9 % — SIGNIFICANT CHANGE UP
COHGB MFR BLDV: 2 % — SIGNIFICANT CHANGE UP
COHGB MFR BLDV: 2.3 % — SIGNIFICANT CHANGE UP
CORTIS 24H UR-MRATE: SIGNIFICANT CHANGE UP MCG/24 H (ref 3.5–45)
CORTIS UR-MCNC: 24 H — SIGNIFICANT CHANGE UP
CORTIS UR-MCNC: 800 ML — SIGNIFICANT CHANGE UP
CREAT SERPL-MCNC: 0.6 MG/DL — SIGNIFICANT CHANGE UP (ref 0.5–1.3)
EGFR: 98 ML/MIN/1.73M2 — SIGNIFICANT CHANGE UP
GAS PNL BLDV: 133 MMOL/L — LOW (ref 136–145)
GAS PNL BLDV: 134 MMOL/L — LOW (ref 136–145)
GAS PNL BLDV: 135 MMOL/L — LOW (ref 136–145)
GLUCOSE BLDA-MCNC: 105 MG/DL — HIGH (ref 70–99)
GLUCOSE BLDA-MCNC: 129 MG/DL — HIGH (ref 70–99)
GLUCOSE BLDA-MCNC: 134 MG/DL — HIGH (ref 70–99)
GLUCOSE BLDA-MCNC: 136 MG/DL — HIGH (ref 70–99)
GLUCOSE BLDA-MCNC: 144 MG/DL — HIGH (ref 70–99)
GLUCOSE BLDA-MCNC: 156 MG/DL — HIGH (ref 70–99)
GLUCOSE BLDA-MCNC: 161 MG/DL — HIGH (ref 70–99)
GLUCOSE BLDA-MCNC: 163 MG/DL — HIGH (ref 70–99)
GLUCOSE BLDV-MCNC: 148 MG/DL — HIGH (ref 70–99)
GLUCOSE BLDV-MCNC: 161 MG/DL — HIGH (ref 70–99)
GLUCOSE BLDV-MCNC: 161 MG/DL — HIGH (ref 70–99)
GLUCOSE BLDV-MCNC: 163 MG/DL — HIGH (ref 70–99)
GLUCOSE BLDV-MCNC: 163 MG/DL — HIGH (ref 70–99)
GLUCOSE SERPL-MCNC: 97 MG/DL — SIGNIFICANT CHANGE UP (ref 70–99)
HCO3 BLDA-SCNC: 18 MMOL/L — LOW (ref 21–28)
HCO3 BLDA-SCNC: 23 MMOL/L — SIGNIFICANT CHANGE UP (ref 21–28)
HCO3 BLDA-SCNC: 23 MMOL/L — SIGNIFICANT CHANGE UP (ref 21–28)
HCO3 BLDA-SCNC: 24 MMOL/L — SIGNIFICANT CHANGE UP (ref 21–28)
HCO3 BLDA-SCNC: 26 MMOL/L — SIGNIFICANT CHANGE UP (ref 21–28)
HCO3 BLDA-SCNC: 32 MMOL/L — HIGH (ref 21–28)
HCO3 BLDV-SCNC: 26 MMOL/L — SIGNIFICANT CHANGE UP (ref 22–29)
HCO3 BLDV-SCNC: 28 MMOL/L — SIGNIFICANT CHANGE UP (ref 22–29)
HCO3 BLDV-SCNC: 32 MMOL/L — HIGH (ref 22–29)
HCT VFR BLD CALC: 32.2 % — LOW (ref 34.5–45)
HCT VFR BLDA CALC: 23 % — SIGNIFICANT CHANGE UP
HCT VFR BLDA CALC: 23 % — SIGNIFICANT CHANGE UP
HCT VFR BLDA CALC: 26 % — SIGNIFICANT CHANGE UP
HCT VFR BLDA CALC: 27 % — SIGNIFICANT CHANGE UP
HCT VFR BLDA CALC: 28 % — SIGNIFICANT CHANGE UP
HCT VFR BLDA CALC: 29 % — SIGNIFICANT CHANGE UP
HCT VFR BLDA CALC: 30 % — SIGNIFICANT CHANGE UP
HCT VFR BLDA CALC: 38 % — SIGNIFICANT CHANGE UP
HCT VFR BLDA CALC: 42 % — SIGNIFICANT CHANGE UP
HGB BLD CALC-MCNC: 10 G/DL — LOW (ref 11.7–16.1)
HGB BLD CALC-MCNC: 12.6 G/DL — SIGNIFICANT CHANGE UP (ref 11.7–16.1)
HGB BLD CALC-MCNC: 7.6 G/DL — LOW (ref 11.7–16.1)
HGB BLD CALC-MCNC: 8.5 G/DL — LOW (ref 11.7–16.1)
HGB BLD CALC-MCNC: 9.4 G/DL — LOW (ref 11.7–16.1)
HGB BLD-MCNC: 10.4 G/DL — LOW (ref 11.5–15.5)
HGB BLDA-MCNC: 10 G/DL — LOW (ref 11.7–16.1)
HGB BLDA-MCNC: 14.1 G/DL — SIGNIFICANT CHANGE UP (ref 11.7–16.1)
HGB BLDA-MCNC: 7.7 G/DL — LOW (ref 11.7–16.1)
HGB BLDA-MCNC: 8.5 G/DL — LOW (ref 11.7–16.1)
HGB BLDA-MCNC: 8.7 G/DL — LOW (ref 11.7–16.1)
HGB BLDA-MCNC: 8.9 G/DL — LOW (ref 11.7–16.1)
HGB BLDA-MCNC: 9.5 G/DL — LOW (ref 11.7–16.1)
HGB BLDA-MCNC: 9.9 G/DL — LOW (ref 11.7–16.1)
LACTATE BLDA-MCNC: 0.5 MMOL/L — SIGNIFICANT CHANGE UP (ref 0.5–2)
LACTATE BLDA-MCNC: 0.9 MMOL/L — SIGNIFICANT CHANGE UP (ref 0.5–2)
LACTATE BLDA-MCNC: 1.1 MMOL/L — SIGNIFICANT CHANGE UP (ref 0.5–2)
LACTATE BLDA-MCNC: 1.3 MMOL/L — SIGNIFICANT CHANGE UP (ref 0.5–2)
LACTATE BLDA-MCNC: 1.3 MMOL/L — SIGNIFICANT CHANGE UP (ref 0.5–2)
LACTATE BLDA-MCNC: 1.7 MMOL/L — SIGNIFICANT CHANGE UP (ref 0.5–2)
LACTATE BLDA-MCNC: 1.7 MMOL/L — SIGNIFICANT CHANGE UP (ref 0.5–2)
LACTATE BLDA-MCNC: 2.5 MMOL/L — HIGH (ref 0.5–2)
LACTATE BLDV-MCNC: 0.9 MMOL/L — SIGNIFICANT CHANGE UP (ref 0.5–2)
LACTATE BLDV-MCNC: 1 MMOL/L — SIGNIFICANT CHANGE UP (ref 0.5–2)
LACTATE BLDV-MCNC: 1.3 MMOL/L — SIGNIFICANT CHANGE UP (ref 0.5–2)
LACTATE BLDV-MCNC: 1.3 MMOL/L — SIGNIFICANT CHANGE UP (ref 0.5–2)
LACTATE BLDV-MCNC: 1.4 MMOL/L — SIGNIFICANT CHANGE UP (ref 0.5–2)
MAGNESIUM SERPL-MCNC: 1.9 MG/DL — SIGNIFICANT CHANGE UP (ref 1.6–2.6)
MCHC RBC-ENTMCNC: 30.5 PG — SIGNIFICANT CHANGE UP (ref 27–34)
MCHC RBC-ENTMCNC: 32.3 GM/DL — SIGNIFICANT CHANGE UP (ref 32–36)
MCV RBC AUTO: 94.4 FL — SIGNIFICANT CHANGE UP (ref 80–100)
METHGB MFR BLDA: 0.2 % — SIGNIFICANT CHANGE UP
METHGB MFR BLDA: 0.4 % — SIGNIFICANT CHANGE UP
METHGB MFR BLDA: 0.4 % — SIGNIFICANT CHANGE UP
METHGB MFR BLDA: 0.6 % — SIGNIFICANT CHANGE UP
METHGB MFR BLDA: 0.8 % — SIGNIFICANT CHANGE UP
METHGB MFR BLDA: 1.1 % — SIGNIFICANT CHANGE UP
METHGB MFR BLDA: 1.5 % — SIGNIFICANT CHANGE UP
METHGB MFR BLDA: 1.8 % — HIGH
METHGB MFR BLDV: 0.6 % — SIGNIFICANT CHANGE UP
METHGB MFR BLDV: 0.6 % — SIGNIFICANT CHANGE UP
METHGB MFR BLDV: 0.7 % — SIGNIFICANT CHANGE UP
METHGB MFR BLDV: 0.8 % — SIGNIFICANT CHANGE UP
METHGB MFR BLDV: 1.4 % — SIGNIFICANT CHANGE UP
OXYHGB MFR BLDA: 96 % — HIGH (ref 90–95)
OXYHGB MFR BLDA: 97 % — HIGH (ref 90–95)
OXYHGB MFR BLDA: 98 % — HIGH (ref 90–95)
PCO2 BLDA: 32 MMHG — SIGNIFICANT CHANGE UP (ref 32–45)
PCO2 BLDA: 34 MMHG — SIGNIFICANT CHANGE UP (ref 32–45)
PCO2 BLDA: 36 MMHG — SIGNIFICANT CHANGE UP (ref 32–45)
PCO2 BLDA: 39 MMHG — SIGNIFICANT CHANGE UP (ref 32–45)
PCO2 BLDA: 41 MMHG — SIGNIFICANT CHANGE UP (ref 32–45)
PCO2 BLDA: 43 MMHG — SIGNIFICANT CHANGE UP (ref 32–45)
PCO2 BLDA: 44 MMHG — SIGNIFICANT CHANGE UP (ref 32–45)
PCO2 BLDA: 51 MMHG — HIGH (ref 32–45)
PCO2 BLDV: 36 MMHG — LOW (ref 39–42)
PCO2 BLDV: 38 MMHG — LOW (ref 39–42)
PCO2 BLDV: 46 MMHG — HIGH (ref 39–42)
PCO2 BLDV: 48 MMHG — HIGH (ref 39–42)
PCO2 BLDV: 67 MMHG — HIGH (ref 39–42)
PH BLDA: 7.31 — LOW (ref 7.35–7.45)
PH BLDA: 7.33 — LOW (ref 7.35–7.45)
PH BLDA: 7.38 — SIGNIFICANT CHANGE UP (ref 7.35–7.45)
PH BLDA: 7.39 — SIGNIFICANT CHANGE UP (ref 7.35–7.45)
PH BLDA: 7.41 — SIGNIFICANT CHANGE UP (ref 7.35–7.45)
PH BLDA: 7.41 — SIGNIFICANT CHANGE UP (ref 7.35–7.45)
PH BLDA: 7.49 — HIGH (ref 7.35–7.45)
PH BLDA: 7.49 — HIGH (ref 7.35–7.45)
PH BLDV: 7.28 — LOW (ref 7.32–7.43)
PH BLDV: 7.35 — SIGNIFICANT CHANGE UP (ref 7.32–7.43)
PH BLDV: 7.39 — SIGNIFICANT CHANGE UP (ref 7.32–7.43)
PH BLDV: 7.45 — HIGH (ref 7.32–7.43)
PH BLDV: 7.47 — HIGH (ref 7.32–7.43)
PLATELET # BLD AUTO: 219 K/UL — SIGNIFICANT CHANGE UP (ref 150–400)
PO2 BLDA: 147 MMHG — HIGH (ref 83–108)
PO2 BLDA: 176 MMHG — HIGH (ref 83–108)
PO2 BLDA: 191 MMHG — HIGH (ref 83–108)
PO2 BLDA: 309 MMHG — HIGH (ref 83–108)
PO2 BLDA: 480 MMHG — HIGH (ref 83–108)
PO2 BLDA: >496 MMHG — HIGH (ref 83–108)
PO2 BLDV: 51 MMHG — HIGH (ref 25–45)
PO2 BLDV: 56 MMHG — HIGH (ref 25–45)
PO2 BLDV: 60 MMHG — HIGH (ref 25–45)
PO2 BLDV: 63 MMHG — HIGH (ref 25–45)
PO2 BLDV: 69 MMHG — HIGH (ref 25–45)
POTASSIUM BLDA-SCNC: 2.9 MMOL/L — CRITICAL LOW (ref 3.5–5.1)
POTASSIUM BLDA-SCNC: 4.1 MMOL/L — SIGNIFICANT CHANGE UP (ref 3.5–5.1)
POTASSIUM BLDA-SCNC: 4.6 MMOL/L — SIGNIFICANT CHANGE UP (ref 3.5–5.1)
POTASSIUM BLDA-SCNC: 4.7 MMOL/L — SIGNIFICANT CHANGE UP (ref 3.5–5.1)
POTASSIUM BLDA-SCNC: 4.8 MMOL/L — SIGNIFICANT CHANGE UP (ref 3.5–5.1)
POTASSIUM BLDA-SCNC: 5.2 MMOL/L — HIGH (ref 3.5–5.1)
POTASSIUM BLDA-SCNC: 6 MMOL/L — HIGH (ref 3.5–5.1)
POTASSIUM BLDA-SCNC: 6.1 MMOL/L — HIGH (ref 3.5–5.1)
POTASSIUM BLDV-SCNC: 4.6 MMOL/L — SIGNIFICANT CHANGE UP (ref 3.5–5.1)
POTASSIUM BLDV-SCNC: 4.7 MMOL/L — SIGNIFICANT CHANGE UP (ref 3.5–5.1)
POTASSIUM BLDV-SCNC: 5.2 MMOL/L — HIGH (ref 3.5–5.1)
POTASSIUM BLDV-SCNC: 6 MMOL/L — HIGH (ref 3.5–5.1)
POTASSIUM BLDV-SCNC: 6.2 MMOL/L — CRITICAL HIGH (ref 3.5–5.1)
POTASSIUM SERPL-MCNC: 4 MMOL/L — SIGNIFICANT CHANGE UP (ref 3.5–5.3)
POTASSIUM SERPL-SCNC: 4 MMOL/L — SIGNIFICANT CHANGE UP (ref 3.5–5.3)
RBC # BLD: 3.41 M/UL — LOW (ref 3.8–5.2)
RBC # FLD: 14.6 % — HIGH (ref 10.3–14.5)
SAO2 % BLDA: 100 % — HIGH (ref 94–98)
SAO2 % BLDA: 98.9 % — HIGH (ref 94–98)
SAO2 % BLDA: 99.3 % — HIGH (ref 94–98)
SAO2 % BLDA: 99.6 % — HIGH (ref 94–98)
SAO2 % BLDA: 99.6 % — HIGH (ref 94–98)
SAO2 % BLDA: 99.7 % — HIGH (ref 94–98)
SAO2 % BLDV: 88 % — SIGNIFICANT CHANGE UP (ref 67–88)
SAO2 % BLDV: 90.4 % — HIGH (ref 67–88)
SAO2 % BLDV: 93.7 % — HIGH (ref 67–88)
SAO2 % BLDV: 94.9 % — HIGH (ref 67–88)
SAO2 % BLDV: 96.8 % — HIGH (ref 67–88)
SODIUM BLDA-SCNC: 133 MMOL/L — LOW (ref 136–145)
SODIUM BLDA-SCNC: 133 MMOL/L — LOW (ref 136–145)
SODIUM BLDA-SCNC: 134 MMOL/L — LOW (ref 136–145)
SODIUM BLDA-SCNC: 135 MMOL/L — LOW (ref 136–145)
SODIUM BLDA-SCNC: 137 MMOL/L — SIGNIFICANT CHANGE UP (ref 136–145)
SODIUM BLDA-SCNC: 141 MMOL/L — SIGNIFICANT CHANGE UP (ref 136–145)
SODIUM SERPL-SCNC: 137 MMOL/L — SIGNIFICANT CHANGE UP (ref 135–145)
WBC # BLD: 12.08 K/UL — HIGH (ref 3.8–10.5)
WBC # FLD AUTO: 12.08 K/UL — HIGH (ref 3.8–10.5)

## 2024-06-19 PROCEDURE — 93325 DOPPLER ECHO COLOR FLOW MAPG: CPT

## 2024-06-19 PROCEDURE — 84484 ASSAY OF TROPONIN QUANT: CPT

## 2024-06-19 PROCEDURE — 83880 ASSAY OF NATRIURETIC PEPTIDE: CPT

## 2024-06-19 PROCEDURE — 71045 X-RAY EXAM CHEST 1 VIEW: CPT | Mod: 26

## 2024-06-19 PROCEDURE — P9100: CPT

## 2024-06-19 PROCEDURE — 83605 ASSAY OF LACTIC ACID: CPT

## 2024-06-19 PROCEDURE — 86891 AUTOLOGOUS BLOOD OP SALVAGE: CPT

## 2024-06-19 PROCEDURE — 71250 CT THORAX DX C-: CPT | Mod: MC

## 2024-06-19 PROCEDURE — C1751: CPT

## 2024-06-19 PROCEDURE — 86900 BLOOD TYPING SEROLOGIC ABO: CPT

## 2024-06-19 PROCEDURE — 94002 VENT MGMT INPAT INIT DAY: CPT

## 2024-06-19 PROCEDURE — P9016: CPT

## 2024-06-19 PROCEDURE — 99024 POSTOP FOLLOW-UP VISIT: CPT

## 2024-06-19 PROCEDURE — 97116 GAIT TRAINING THERAPY: CPT

## 2024-06-19 PROCEDURE — 82553 CREATINE MB FRACTION: CPT

## 2024-06-19 PROCEDURE — 85384 FIBRINOGEN ACTIVITY: CPT

## 2024-06-19 PROCEDURE — 36415 COLL VENOUS BLD VENIPUNCTURE: CPT

## 2024-06-19 PROCEDURE — 86923 COMPATIBILITY TEST ELECTRIC: CPT

## 2024-06-19 PROCEDURE — 36430 TRANSFUSION BLD/BLD COMPNT: CPT

## 2024-06-19 PROCEDURE — 84134 ASSAY OF PREALBUMIN: CPT

## 2024-06-19 PROCEDURE — 85610 PROTHROMBIN TIME: CPT

## 2024-06-19 PROCEDURE — P9045: CPT

## 2024-06-19 PROCEDURE — 84443 ASSAY THYROID STIM HORMONE: CPT

## 2024-06-19 PROCEDURE — 82530 CORTISOL FREE: CPT

## 2024-06-19 PROCEDURE — 80048 BASIC METABOLIC PNL TOTAL CA: CPT

## 2024-06-19 PROCEDURE — 81003 URINALYSIS AUTO W/O SCOPE: CPT

## 2024-06-19 PROCEDURE — 93320 DOPPLER ECHO COMPLETE: CPT

## 2024-06-19 PROCEDURE — 93005 ELECTROCARDIOGRAM TRACING: CPT

## 2024-06-19 PROCEDURE — 87640 STAPH A DNA AMP PROBE: CPT

## 2024-06-19 PROCEDURE — C1769: CPT

## 2024-06-19 PROCEDURE — 84100 ASSAY OF PHOSPHORUS: CPT

## 2024-06-19 PROCEDURE — 85027 COMPLETE CBC AUTOMATED: CPT

## 2024-06-19 PROCEDURE — 84295 ASSAY OF SERUM SODIUM: CPT

## 2024-06-19 PROCEDURE — 94760 N-INVAS EAR/PLS OXIMETRY 1: CPT

## 2024-06-19 PROCEDURE — 87641 MR-STAPH DNA AMP PROBE: CPT

## 2024-06-19 PROCEDURE — C1889: CPT

## 2024-06-19 PROCEDURE — 86850 RBC ANTIBODY SCREEN: CPT

## 2024-06-19 PROCEDURE — 97530 THERAPEUTIC ACTIVITIES: CPT

## 2024-06-19 PROCEDURE — 85730 THROMBOPLASTIN TIME PARTIAL: CPT

## 2024-06-19 PROCEDURE — 94010 BREATHING CAPACITY TEST: CPT

## 2024-06-19 PROCEDURE — 85014 HEMATOCRIT: CPT

## 2024-06-19 PROCEDURE — 36600 WITHDRAWAL OF ARTERIAL BLOOD: CPT

## 2024-06-19 PROCEDURE — P9037: CPT

## 2024-06-19 PROCEDURE — 85576 BLOOD PLATELET AGGREGATION: CPT

## 2024-06-19 PROCEDURE — 82435 ASSAY OF BLOOD CHLORIDE: CPT

## 2024-06-19 PROCEDURE — P9047: CPT

## 2024-06-19 PROCEDURE — 82330 ASSAY OF CALCIUM: CPT

## 2024-06-19 PROCEDURE — 83735 ASSAY OF MAGNESIUM: CPT

## 2024-06-19 PROCEDURE — 71045 X-RAY EXAM CHEST 1 VIEW: CPT

## 2024-06-19 PROCEDURE — 82550 ASSAY OF CK (CPK): CPT

## 2024-06-19 PROCEDURE — 83036 HEMOGLOBIN GLYCOSYLATED A1C: CPT

## 2024-06-19 PROCEDURE — 82803 BLOOD GASES ANY COMBINATION: CPT

## 2024-06-19 PROCEDURE — 93306 TTE W/DOPPLER COMPLETE: CPT

## 2024-06-19 PROCEDURE — 82962 GLUCOSE BLOOD TEST: CPT

## 2024-06-19 PROCEDURE — 84132 ASSAY OF SERUM POTASSIUM: CPT

## 2024-06-19 PROCEDURE — 82947 ASSAY GLUCOSE BLOOD QUANT: CPT

## 2024-06-19 PROCEDURE — 82533 TOTAL CORTISOL: CPT

## 2024-06-19 PROCEDURE — 93880 EXTRACRANIAL BILAT STUDY: CPT

## 2024-06-19 PROCEDURE — 80053 COMPREHEN METABOLIC PANEL: CPT

## 2024-06-19 PROCEDURE — 86901 BLOOD TYPING SEROLOGIC RH(D): CPT

## 2024-06-19 PROCEDURE — 94640 AIRWAY INHALATION TREATMENT: CPT

## 2024-06-19 PROCEDURE — 80061 LIPID PANEL: CPT

## 2024-06-19 PROCEDURE — 85025 COMPLETE CBC W/AUTO DIFF WBC: CPT

## 2024-06-19 PROCEDURE — 85018 HEMOGLOBIN: CPT

## 2024-06-19 RX ORDER — POTASSIUM CHLORIDE 600 MG/1
1 TABLET, FILM COATED, EXTENDED RELEASE ORAL
Qty: 10 | Refills: 0
Start: 2024-06-19 | End: 2024-06-28

## 2024-06-19 RX ORDER — METOPROLOL TARTRATE 50 MG
1 TABLET ORAL
Qty: 60 | Refills: 1
Start: 2024-06-19 | End: 2024-08-17

## 2024-06-19 RX ORDER — BUDESONIDE/FORMOTEROL FUMARATE 160-4.5MCG
2 HFA AEROSOL WITH ADAPTER (GRAM) INHALATION
Qty: 1 | Refills: 2
Start: 2024-06-19 | End: 2024-09-16

## 2024-06-19 RX ORDER — SENNOSIDES 8.6 MG
2 TABLET ORAL
Qty: 14 | Refills: 0
Start: 2024-06-19 | End: 2024-06-25

## 2024-06-19 RX ORDER — ASPIRIN 325 MG/1
1 TABLET, FILM COATED ORAL
Qty: 30 | Refills: 1
Start: 2024-06-19 | End: 2024-08-17

## 2024-06-19 RX ORDER — FOLIC ACID
1 POWDER (GRAM) MISCELLANEOUS
Qty: 30 | Refills: 0
Start: 2024-06-19 | End: 2024-07-18

## 2024-06-19 RX ORDER — THIAMINE HCL 100 MG
1 TABLET ORAL
Qty: 30 | Refills: 0
Start: 2024-06-19 | End: 2024-07-18

## 2024-06-19 RX ORDER — NICOTINE POLACRILEX 2 MG/1
1 LOZENGE ORAL
Qty: 30 | Refills: 2
Start: 2024-06-19 | End: 2024-09-16

## 2024-06-19 RX ORDER — MAGNESIUM SULFATE 100 %
2 POWDER (GRAM) MISCELLANEOUS ONCE
Refills: 0 | Status: COMPLETED | OUTPATIENT
Start: 2024-06-19 | End: 2024-06-19

## 2024-06-19 RX ORDER — FUROSEMIDE 10 MG/ML
1 INJECTION, SOLUTION INTRAMUSCULAR; INTRAVENOUS
Qty: 10 | Refills: 0
Start: 2024-06-19 | End: 2024-06-28

## 2024-06-19 RX ORDER — ACETAMINOPHEN 325 MG
2 TABLET ORAL
Qty: 0 | Refills: 0 | DISCHARGE
Start: 2024-06-19

## 2024-06-19 RX ORDER — ROSUVASTATIN CALCIUM 5 MG/1
1 TABLET ORAL
Refills: 0 | DISCHARGE

## 2024-06-19 RX ORDER — ATORVASTATIN CALCIUM 20 MG/1
1 TABLET, FILM COATED ORAL
Qty: 30 | Refills: 1
Start: 2024-06-19 | End: 2024-08-17

## 2024-06-19 RX ORDER — TIOTROPIUM BROMIDE 18 UG/1
2 CAPSULE ORAL; RESPIRATORY (INHALATION)
Qty: 1 | Refills: 2
Start: 2024-06-19 | End: 2024-09-16

## 2024-06-19 RX ORDER — OXYCODONE HYDROCHLORIDE 100 MG/5ML
1 SOLUTION ORAL
Qty: 28 | Refills: 0
Start: 2024-06-19 | End: 2024-06-25

## 2024-06-19 RX ADMIN — LACTULOSE 10 GRAM(S): 10 SOLUTION ORAL at 05:34

## 2024-06-19 RX ADMIN — NICOTINE POLACRILEX 1 PATCH: 2 LOZENGE ORAL at 07:44

## 2024-06-19 RX ADMIN — FUROSEMIDE 40 MILLIGRAM(S): 10 INJECTION, SOLUTION INTRAMUSCULAR; INTRAVENOUS at 05:34

## 2024-06-19 RX ADMIN — Medication 100 MILLIGRAM(S): at 07:46

## 2024-06-19 RX ADMIN — PANTOPRAZOLE SODIUM 40 MILLIGRAM(S): 40 INJECTION, POWDER, FOR SOLUTION INTRAVENOUS at 07:45

## 2024-06-19 RX ADMIN — Medication 500 MILLIGRAM(S): at 05:34

## 2024-06-19 RX ADMIN — NICOTINE POLACRILEX 1 PATCH: 2 LOZENGE ORAL at 07:46

## 2024-06-19 RX ADMIN — Medication 25 GRAM(S): at 08:39

## 2024-06-19 RX ADMIN — ASPIRIN 325 MILLIGRAM(S): 325 TABLET, FILM COATED ORAL at 07:45

## 2024-06-19 RX ADMIN — Medication 100 MILLIGRAM(S): at 05:34

## 2024-06-19 RX ADMIN — Medication 1 MILLIGRAM(S): at 07:45

## 2024-06-19 RX ADMIN — Medication 25 MILLIGRAM(S): at 05:34

## 2024-06-20 ENCOUNTER — APPOINTMENT (OUTPATIENT)
Dept: CARE COORDINATION | Facility: HOME HEALTH | Age: 69
End: 2024-06-20
Payer: MEDICARE

## 2024-06-20 PROCEDURE — 99024 POSTOP FOLLOW-UP VISIT: CPT

## 2024-06-24 RX ORDER — FOLIC ACID 1 MG/1
1 TABLET ORAL DAILY
Qty: 30 | Refills: 0 | Status: ACTIVE | COMMUNITY
Start: 2024-06-20

## 2024-06-24 RX ORDER — METOPROLOL TARTRATE 25 MG/1
25 TABLET, FILM COATED ORAL DAILY
Refills: 0 | Status: ACTIVE | COMMUNITY
Start: 2024-06-20

## 2024-06-24 RX ORDER — PHENYLEPHRINE HCL 10 MG
7 TABLET ORAL
Refills: 0 | Status: ACTIVE | COMMUNITY
Start: 2024-06-20

## 2024-06-24 RX ORDER — BUDESONIDE AND FORMOTEROL FUMARATE DIHYDRATE 160; 4.5 UG/1; UG/1
160-4.5 AEROSOL RESPIRATORY (INHALATION) TWICE DAILY
Refills: 0 | Status: ACTIVE | COMMUNITY
Start: 2024-06-20

## 2024-06-24 RX ORDER — TIOTROPIUM BROMIDE INHALATION SPRAY 3.12 UG/1
2.5 SPRAY, METERED RESPIRATORY (INHALATION) DAILY
Qty: 1 | Refills: 0 | Status: ACTIVE | COMMUNITY
Start: 2024-06-20

## 2024-06-24 RX ORDER — OXYCODONE 5 MG/1
5 TABLET ORAL EVERY 6 HOURS
Qty: 28 | Refills: 0 | Status: ACTIVE | COMMUNITY
Start: 2024-06-20

## 2024-06-24 RX ORDER — SENNOSIDES 8.6 MG TABLETS 8.6 MG/1
8.6 TABLET ORAL
Qty: 30 | Refills: 0 | Status: ACTIVE | COMMUNITY
Start: 2024-06-20

## 2024-06-24 RX ORDER — ASPIRIN 325 MG/1
325 TABLET, FILM COATED ORAL DAILY
Qty: 30 | Refills: 0 | Status: ACTIVE | COMMUNITY
Start: 2024-06-20 | End: 2024-07-20

## 2024-06-24 RX ORDER — ACETAMINOPHEN 325 MG/1
325 TABLET ORAL EVERY 6 HOURS
Refills: 0 | Status: ACTIVE | COMMUNITY
Start: 2024-06-20

## 2024-06-24 RX ORDER — ATORVASTATIN CALCIUM 80 MG/1
80 TABLET, FILM COATED ORAL
Refills: 0 | Status: ACTIVE | COMMUNITY
Start: 2024-06-20

## 2024-06-26 ENCOUNTER — APPOINTMENT (OUTPATIENT)
Dept: CARDIOTHORACIC SURGERY | Facility: CLINIC | Age: 69
End: 2024-06-26
Payer: MEDICARE

## 2024-06-26 ENCOUNTER — INPATIENT (INPATIENT)
Facility: HOSPITAL | Age: 69
LOS: 1 days | Discharge: NURSING, REHAB OR LONG-TERM CARE | DRG: 921 | End: 2024-06-28
Attending: THORACIC SURGERY (CARDIOTHORACIC VASCULAR SURGERY) | Admitting: THORACIC SURGERY (CARDIOTHORACIC VASCULAR SURGERY)
Payer: MEDICARE

## 2024-06-26 VITALS
DIASTOLIC BLOOD PRESSURE: 67 MMHG | HEART RATE: 64 BPM | TEMPERATURE: 98 F | SYSTOLIC BLOOD PRESSURE: 128 MMHG | HEIGHT: 65 IN | WEIGHT: 188.94 LBS | OXYGEN SATURATION: 92 % | RESPIRATION RATE: 18 BRPM

## 2024-06-26 VITALS
OXYGEN SATURATION: 95 % | RESPIRATION RATE: 16 BRPM | DIASTOLIC BLOOD PRESSURE: 68 MMHG | SYSTOLIC BLOOD PRESSURE: 112 MMHG | HEART RATE: 68 BPM

## 2024-06-26 DIAGNOSIS — T81.30XA DISRUPTION OF WOUND, UNSPECIFIED, INITIAL ENCOUNTER: ICD-10-CM

## 2024-06-26 DIAGNOSIS — S02.609A FRACTURE OF MANDIBLE, UNSPECIFIED, INITIAL ENCOUNTER FOR CLOSED FRACTURE: Chronic | ICD-10-CM

## 2024-06-26 DIAGNOSIS — Z95.1 PRESENCE OF AORTOCORONARY BYPASS GRAFT: Chronic | ICD-10-CM

## 2024-06-26 DIAGNOSIS — Z95.1 PRESENCE OF AORTOCORONARY BYPASS GRAFT: ICD-10-CM

## 2024-06-26 LAB
ALBUMIN SERPL ELPH-MCNC: 3.5 G/DL — SIGNIFICANT CHANGE UP (ref 3.3–5.2)
ALP SERPL-CCNC: 64 U/L — SIGNIFICANT CHANGE UP (ref 40–120)
ALT FLD-CCNC: 31 U/L — SIGNIFICANT CHANGE UP
ANION GAP SERPL CALC-SCNC: 12 MMOL/L — SIGNIFICANT CHANGE UP (ref 5–17)
APTT BLD: 25.5 SEC — SIGNIFICANT CHANGE UP (ref 24.5–35.6)
AST SERPL-CCNC: 37 U/L — HIGH
BILIRUB SERPL-MCNC: 0.4 MG/DL — SIGNIFICANT CHANGE UP (ref 0.4–2)
BLD GP AB SCN SERPL QL: SIGNIFICANT CHANGE UP
BUN SERPL-MCNC: 16.6 MG/DL — SIGNIFICANT CHANGE UP (ref 8–20)
CALCIUM SERPL-MCNC: 9.2 MG/DL — SIGNIFICANT CHANGE UP (ref 8.4–10.5)
CHLORIDE SERPL-SCNC: 96 MMOL/L — SIGNIFICANT CHANGE UP (ref 96–108)
CO2 SERPL-SCNC: 31 MMOL/L — HIGH (ref 22–29)
CREAT SERPL-MCNC: 0.61 MG/DL — SIGNIFICANT CHANGE UP (ref 0.5–1.3)
EGFR: 97 ML/MIN/1.73M2 — SIGNIFICANT CHANGE UP
GLUCOSE SERPL-MCNC: 99 MG/DL — SIGNIFICANT CHANGE UP (ref 70–99)
HCT VFR BLD CALC: 33.1 % — LOW (ref 34.5–45)
HGB BLD-MCNC: 10.7 G/DL — LOW (ref 11.5–15.5)
INR BLD: 1.04 RATIO — SIGNIFICANT CHANGE UP (ref 0.85–1.18)
MAGNESIUM SERPL-MCNC: 1.8 MG/DL — SIGNIFICANT CHANGE UP (ref 1.6–2.6)
MCHC RBC-ENTMCNC: 30.4 PG — SIGNIFICANT CHANGE UP (ref 27–34)
MCHC RBC-ENTMCNC: 32.3 GM/DL — SIGNIFICANT CHANGE UP (ref 32–36)
MCV RBC AUTO: 94 FL — SIGNIFICANT CHANGE UP (ref 80–100)
PHOSPHATE SERPL-MCNC: 4.6 MG/DL — SIGNIFICANT CHANGE UP (ref 2.4–4.7)
PLATELET # BLD AUTO: 416 K/UL — HIGH (ref 150–400)
POTASSIUM SERPL-MCNC: 4.8 MMOL/L — SIGNIFICANT CHANGE UP (ref 3.5–5.3)
POTASSIUM SERPL-SCNC: 4.8 MMOL/L — SIGNIFICANT CHANGE UP (ref 3.5–5.3)
PROT SERPL-MCNC: 6.6 G/DL — SIGNIFICANT CHANGE UP (ref 6.6–8.7)
PROTHROM AB SERPL-ACNC: 11.5 SEC — SIGNIFICANT CHANGE UP (ref 9.5–13)
RBC # BLD: 3.52 M/UL — LOW (ref 3.8–5.2)
RBC # FLD: 14.3 % — SIGNIFICANT CHANGE UP (ref 10.3–14.5)
SODIUM SERPL-SCNC: 139 MMOL/L — SIGNIFICANT CHANGE UP (ref 135–145)
WBC # BLD: 18.09 K/UL — HIGH (ref 3.8–10.5)
WBC # FLD AUTO: 18.09 K/UL — HIGH (ref 3.8–10.5)

## 2024-06-26 PROCEDURE — 99024 POSTOP FOLLOW-UP VISIT: CPT

## 2024-06-26 PROCEDURE — 99285 EMERGENCY DEPT VISIT HI MDM: CPT | Mod: GC

## 2024-06-26 PROCEDURE — 97605 NEG PRS WND THER DME<=50SQCM: CPT

## 2024-06-26 PROCEDURE — 71045 X-RAY EXAM CHEST 1 VIEW: CPT | Mod: 26

## 2024-06-26 PROCEDURE — 99223 1ST HOSP IP/OBS HIGH 75: CPT

## 2024-06-26 RX ORDER — METOPROLOL TARTRATE 50 MG
25 TABLET ORAL
Refills: 0 | Status: DISCONTINUED | OUTPATIENT
Start: 2024-06-26 | End: 2024-06-28

## 2024-06-26 RX ORDER — PIPERACILLIN SODIUM AND TAZOBACTAM SODIUM 3; .375 G/15ML; G/15ML
3.38 INJECTION, POWDER, LYOPHILIZED, FOR SOLUTION INTRAVENOUS EVERY 8 HOURS
Refills: 0 | Status: DISCONTINUED | OUTPATIENT
Start: 2024-06-26 | End: 2024-06-28

## 2024-06-26 RX ORDER — FOLIC ACID
1 POWDER (GRAM) MISCELLANEOUS DAILY
Refills: 0 | Status: DISCONTINUED | OUTPATIENT
Start: 2024-06-26 | End: 2024-06-28

## 2024-06-26 RX ORDER — VANCOMYCIN HYDROCHLORIDE 50 MG/ML
KIT ORAL
Refills: 0 | Status: DISCONTINUED | OUTPATIENT
Start: 2024-06-26 | End: 2024-06-28

## 2024-06-26 RX ORDER — NYSTATIN 100000/G
1 POWDER (GRAM) TOPICAL
Refills: 0 | Status: DISCONTINUED | OUTPATIENT
Start: 2024-06-26 | End: 2024-06-28

## 2024-06-26 RX ORDER — MAGNESIUM SULFATE 100 %
2 POWDER (GRAM) MISCELLANEOUS ONCE
Refills: 0 | Status: COMPLETED | OUTPATIENT
Start: 2024-06-26 | End: 2024-06-27

## 2024-06-26 RX ORDER — NICOTINE POLACRILEX 2 MG/1
1 LOZENGE ORAL DAILY
Refills: 0 | Status: DISCONTINUED | OUTPATIENT
Start: 2024-06-26 | End: 2024-06-28

## 2024-06-26 RX ORDER — BUDESONIDE/FORMOTEROL FUMARATE 160-4.5MCG
2 HFA AEROSOL WITH ADAPTER (GRAM) INHALATION
Refills: 0 | Status: DISCONTINUED | OUTPATIENT
Start: 2024-06-26 | End: 2024-06-28

## 2024-06-26 RX ORDER — SENNOSIDES 8.6 MG
2 TABLET ORAL AT BEDTIME
Refills: 0 | Status: DISCONTINUED | OUTPATIENT
Start: 2024-06-26 | End: 2024-06-28

## 2024-06-26 RX ORDER — THIAMINE HCL 100 MG
100 TABLET ORAL DAILY
Refills: 0 | Status: DISCONTINUED | OUTPATIENT
Start: 2024-06-26 | End: 2024-06-28

## 2024-06-26 RX ORDER — PIPERACILLIN SODIUM AND TAZOBACTAM SODIUM 3; .375 G/15ML; G/15ML
3.38 INJECTION, POWDER, LYOPHILIZED, FOR SOLUTION INTRAVENOUS ONCE
Refills: 0 | Status: COMPLETED | OUTPATIENT
Start: 2024-06-26 | End: 2024-06-26

## 2024-06-26 RX ORDER — ACETAMINOPHEN 325 MG
650 TABLET ORAL EVERY 6 HOURS
Refills: 0 | Status: DISCONTINUED | OUTPATIENT
Start: 2024-06-26 | End: 2024-06-28

## 2024-06-26 RX ORDER — TIOTROPIUM BROMIDE 18 UG/1
2 CAPSULE ORAL; RESPIRATORY (INHALATION) DAILY
Refills: 0 | Status: DISCONTINUED | OUTPATIENT
Start: 2024-06-26 | End: 2024-06-28

## 2024-06-26 RX ORDER — ATORVASTATIN CALCIUM 20 MG/1
80 TABLET, FILM COATED ORAL AT BEDTIME
Refills: 0 | Status: DISCONTINUED | OUTPATIENT
Start: 2024-06-26 | End: 2024-06-28

## 2024-06-26 RX ORDER — ASPIRIN 325 MG/1
325 TABLET, FILM COATED ORAL DAILY
Refills: 0 | Status: DISCONTINUED | OUTPATIENT
Start: 2024-06-26 | End: 2024-06-28

## 2024-06-26 RX ORDER — SODIUM CHLORIDE 0.9 % (FLUSH) 0.9 %
3 SYRINGE (ML) INJECTION EVERY 8 HOURS
Refills: 0 | Status: DISCONTINUED | OUTPATIENT
Start: 2024-06-26 | End: 2024-06-28

## 2024-06-26 RX ORDER — VANCOMYCIN HYDROCHLORIDE 50 MG/ML
1000 KIT ORAL ONCE
Refills: 0 | Status: COMPLETED | OUTPATIENT
Start: 2024-06-26 | End: 2024-06-26

## 2024-06-26 RX ORDER — OXYCODONE HYDROCHLORIDE 100 MG/5ML
5 SOLUTION ORAL EVERY 6 HOURS
Refills: 0 | Status: DISCONTINUED | OUTPATIENT
Start: 2024-06-26 | End: 2024-06-28

## 2024-06-26 RX ORDER — LIDOCAINE HYDROCHLORIDE 20 MG/ML
10 INJECTION, SOLUTION EPIDURAL; INFILTRATION; INTRACAUDAL; PERINEURAL ONCE
Refills: 0 | Status: DISCONTINUED | OUTPATIENT
Start: 2024-06-26 | End: 2024-06-26

## 2024-06-26 RX ORDER — ENOXAPARIN SODIUM 100 MG/ML
40 INJECTION SUBCUTANEOUS EVERY 24 HOURS
Refills: 0 | Status: DISCONTINUED | OUTPATIENT
Start: 2024-06-27 | End: 2024-06-28

## 2024-06-26 RX ORDER — VANCOMYCIN HYDROCHLORIDE 50 MG/ML
1000 KIT ORAL EVERY 12 HOURS
Refills: 0 | Status: DISCONTINUED | OUTPATIENT
Start: 2024-06-27 | End: 2024-06-28

## 2024-06-26 RX ADMIN — PIPERACILLIN SODIUM AND TAZOBACTAM SODIUM 200 GRAM(S): 3; .375 INJECTION, POWDER, LYOPHILIZED, FOR SOLUTION INTRAVENOUS at 14:13

## 2024-06-26 RX ADMIN — Medication 3 MILLILITER(S): at 22:07

## 2024-06-26 RX ADMIN — Medication 25 MILLIGRAM(S): at 18:31

## 2024-06-26 RX ADMIN — Medication 3 MILLILITER(S): at 14:49

## 2024-06-26 RX ADMIN — ATORVASTATIN CALCIUM 80 MILLIGRAM(S): 20 TABLET, FILM COATED ORAL at 22:08

## 2024-06-26 RX ADMIN — VANCOMYCIN HYDROCHLORIDE 250 MILLIGRAM(S): KIT at 14:49

## 2024-06-26 RX ADMIN — Medication 1 APPLICATION(S): at 22:08

## 2024-06-26 RX ADMIN — OXYCODONE HYDROCHLORIDE 5 MILLIGRAM(S): 100 SOLUTION ORAL at 23:08

## 2024-06-26 RX ADMIN — PIPERACILLIN SODIUM AND TAZOBACTAM SODIUM 25 GRAM(S): 3; .375 INJECTION, POWDER, LYOPHILIZED, FOR SOLUTION INTRAVENOUS at 18:30

## 2024-06-26 RX ADMIN — PIPERACILLIN SODIUM AND TAZOBACTAM SODIUM 25 GRAM(S): 3; .375 INJECTION, POWDER, LYOPHILIZED, FOR SOLUTION INTRAVENOUS at 22:08

## 2024-06-26 RX ADMIN — Medication 2 PUFF(S): at 20:58

## 2024-06-26 RX ADMIN — OXYCODONE HYDROCHLORIDE 5 MILLIGRAM(S): 100 SOLUTION ORAL at 22:08

## 2024-06-26 NOTE — PHYSICAL EXAM
[Respiration, Rhythm And Depth] : normal respiratory rhythm and effort [Heart Rate And Rhythm] : heart rate was normal and rhythm regular [Foul Odor] : a foul smell [Purulent Drainage] : purulent drainage [Serosanguinous Drainage] : serosanguinous drainage [Erythema] : erythematous [Warm] : warm [Tender] : tender [Pitting Edema] : pitting edema present [___ +] : bilateral ankle [unfilled]U+ pitting edema

## 2024-06-26 NOTE — REASON FOR VISIT
[de-identified] : CABG x 4 (LIMA_LAD, SVG-OM, SVG-Diag, SVG-PDA) [de-identified] : 06/14/24 [de-identified] : 69F with h/o HLD, macular degeneration (has injections q3-4 months, next due July), current smoker (2ppd, 50 years), current drinker (2 beers/day), who presented to [facility] for evaluation after abnormal nuclear stress test, found to have MVD. Transferred to [facility] for CABGx4 on 6/14 by Dr. Richards.

## 2024-06-26 NOTE — ASSESSMENT
[FreeTextEntry1] : Ms Truong presents to the office on 3L NC oxygen via wheelchair accompanied by her sister and brother in law. She states that she is feeling overall well but is having bilateral leg pains with walking that is unchanged from her pre operative state. She notices it is only when she walks and says she is sedentary and lazy. She is showering almost every other day when an aid comes to help her. She is currently living with her sister. Chest tube site retention sutures removed well approximated. Sternal incision with erythema and distal pole 4 cm length dehiscence with fatty necrotic tissue noted, unclear of depth or tunneling however there is also serous drainage coming from the more superior section of the sternal incision. Dr Nam called to evaluate wound. Plan to obtain evaluation in ER with possible debridement IVABX and CT of the chest. The patient is agreeable and was transferred to the ER via wheelchair stable.  PLAN: - Admission for IVABX and CT Scan of the chest   *The above was evaluated and discussed with Dr Nam who developed the plan of care

## 2024-06-27 ENCOUNTER — TRANSCRIPTION ENCOUNTER (OUTPATIENT)
Age: 69
End: 2024-06-27

## 2024-06-27 LAB
ANION GAP SERPL CALC-SCNC: 13 MMOL/L — SIGNIFICANT CHANGE UP (ref 5–17)
BUN SERPL-MCNC: 13.2 MG/DL — SIGNIFICANT CHANGE UP (ref 8–20)
CALCIUM SERPL-MCNC: 9 MG/DL — SIGNIFICANT CHANGE UP (ref 8.4–10.5)
CHLORIDE SERPL-SCNC: 95 MMOL/L — LOW (ref 96–108)
CO2 SERPL-SCNC: 29 MMOL/L — SIGNIFICANT CHANGE UP (ref 22–29)
CREAT SERPL-MCNC: 0.67 MG/DL — SIGNIFICANT CHANGE UP (ref 0.5–1.3)
EGFR: 95 ML/MIN/1.73M2 — SIGNIFICANT CHANGE UP
GLUCOSE SERPL-MCNC: 110 MG/DL — HIGH (ref 70–99)
HCT VFR BLD CALC: 30.4 % — LOW (ref 34.5–45)
HGB BLD-MCNC: 10 G/DL — LOW (ref 11.5–15.5)
MAGNESIUM SERPL-MCNC: 1.8 MG/DL — SIGNIFICANT CHANGE UP (ref 1.6–2.6)
MCHC RBC-ENTMCNC: 30 PG — SIGNIFICANT CHANGE UP (ref 27–34)
MCHC RBC-ENTMCNC: 32.9 GM/DL — SIGNIFICANT CHANGE UP (ref 32–36)
MCV RBC AUTO: 91.3 FL — SIGNIFICANT CHANGE UP (ref 80–100)
PLATELET # BLD AUTO: 371 K/UL — SIGNIFICANT CHANGE UP (ref 150–400)
POTASSIUM SERPL-MCNC: 4 MMOL/L — SIGNIFICANT CHANGE UP (ref 3.5–5.3)
POTASSIUM SERPL-SCNC: 4 MMOL/L — SIGNIFICANT CHANGE UP (ref 3.5–5.3)
RBC # BLD: 3.33 M/UL — LOW (ref 3.8–5.2)
RBC # FLD: 14.5 % — SIGNIFICANT CHANGE UP (ref 10.3–14.5)
SODIUM SERPL-SCNC: 137 MMOL/L — SIGNIFICANT CHANGE UP (ref 135–145)
WBC # BLD: 18.76 K/UL — HIGH (ref 3.8–10.5)
WBC # FLD AUTO: 18.76 K/UL — HIGH (ref 3.8–10.5)

## 2024-06-27 PROCEDURE — 71045 X-RAY EXAM CHEST 1 VIEW: CPT | Mod: 26

## 2024-06-27 PROCEDURE — 99232 SBSQ HOSP IP/OBS MODERATE 35: CPT

## 2024-06-27 PROCEDURE — 99231 SBSQ HOSP IP/OBS SF/LOW 25: CPT | Mod: 24

## 2024-06-27 RX ORDER — SACCHAROMYCES BOULARDII 50 MG
250 CAPSULE ORAL
Refills: 0 | Status: DISCONTINUED | OUTPATIENT
Start: 2024-06-27 | End: 2024-06-28

## 2024-06-27 RX ADMIN — NICOTINE POLACRILEX 1 PATCH: 2 LOZENGE ORAL at 14:27

## 2024-06-27 RX ADMIN — NICOTINE POLACRILEX 1 PATCH: 2 LOZENGE ORAL at 18:16

## 2024-06-27 RX ADMIN — Medication 25 MILLIGRAM(S): at 05:26

## 2024-06-27 RX ADMIN — OXYCODONE HYDROCHLORIDE 5 MILLIGRAM(S): 100 SOLUTION ORAL at 22:53

## 2024-06-27 RX ADMIN — Medication 250 MILLIGRAM(S): at 19:01

## 2024-06-27 RX ADMIN — VANCOMYCIN HYDROCHLORIDE 250 MILLIGRAM(S): KIT at 17:47

## 2024-06-27 RX ADMIN — Medication 100 MILLIGRAM(S): at 14:27

## 2024-06-27 RX ADMIN — PIPERACILLIN SODIUM AND TAZOBACTAM SODIUM 25 GRAM(S): 3; .375 INJECTION, POWDER, LYOPHILIZED, FOR SOLUTION INTRAVENOUS at 06:32

## 2024-06-27 RX ADMIN — Medication 25 GRAM(S): at 23:46

## 2024-06-27 RX ADMIN — ATORVASTATIN CALCIUM 80 MILLIGRAM(S): 20 TABLET, FILM COATED ORAL at 22:53

## 2024-06-27 RX ADMIN — Medication 1 APPLICATION(S): at 05:26

## 2024-06-27 RX ADMIN — Medication 3 MILLILITER(S): at 05:26

## 2024-06-27 RX ADMIN — TIOTROPIUM BROMIDE 2 PUFF(S): 18 CAPSULE ORAL; RESPIRATORY (INHALATION) at 09:22

## 2024-06-27 RX ADMIN — Medication 1 APPLICATION(S): at 17:47

## 2024-06-27 RX ADMIN — PIPERACILLIN SODIUM AND TAZOBACTAM SODIUM 25 GRAM(S): 3; .375 INJECTION, POWDER, LYOPHILIZED, FOR SOLUTION INTRAVENOUS at 22:52

## 2024-06-27 RX ADMIN — ASPIRIN 325 MILLIGRAM(S): 325 TABLET, FILM COATED ORAL at 14:27

## 2024-06-27 RX ADMIN — OXYCODONE HYDROCHLORIDE 5 MILLIGRAM(S): 100 SOLUTION ORAL at 13:29

## 2024-06-27 RX ADMIN — Medication 1 MILLIGRAM(S): at 14:27

## 2024-06-27 RX ADMIN — Medication 3 MILLILITER(S): at 13:58

## 2024-06-27 RX ADMIN — ENOXAPARIN SODIUM 40 MILLIGRAM(S): 100 INJECTION SUBCUTANEOUS at 22:52

## 2024-06-27 RX ADMIN — PIPERACILLIN SODIUM AND TAZOBACTAM SODIUM 25 GRAM(S): 3; .375 INJECTION, POWDER, LYOPHILIZED, FOR SOLUTION INTRAVENOUS at 14:26

## 2024-06-27 RX ADMIN — Medication 25 MILLIGRAM(S): at 17:43

## 2024-06-27 RX ADMIN — Medication 3 MILLILITER(S): at 22:50

## 2024-06-27 RX ADMIN — Medication 2 PUFF(S): at 09:19

## 2024-06-27 RX ADMIN — VANCOMYCIN HYDROCHLORIDE 250 MILLIGRAM(S): KIT at 05:25

## 2024-06-27 RX ADMIN — Medication 2 PUFF(S): at 20:42

## 2024-06-27 RX ADMIN — OXYCODONE HYDROCHLORIDE 5 MILLIGRAM(S): 100 SOLUTION ORAL at 14:00

## 2024-06-28 ENCOUNTER — TRANSCRIPTION ENCOUNTER (OUTPATIENT)
Age: 69
End: 2024-06-28

## 2024-06-28 VITALS
RESPIRATION RATE: 18 BRPM | SYSTOLIC BLOOD PRESSURE: 116 MMHG | DIASTOLIC BLOOD PRESSURE: 69 MMHG | OXYGEN SATURATION: 96 % | HEART RATE: 88 BPM | TEMPERATURE: 98 F

## 2024-06-28 LAB
ANION GAP SERPL CALC-SCNC: 12 MMOL/L — SIGNIFICANT CHANGE UP (ref 5–17)
BASOPHILS # BLD AUTO: 0.27 K/UL — HIGH (ref 0–0.2)
BASOPHILS NFR BLD AUTO: 1.7 % — SIGNIFICANT CHANGE UP (ref 0–2)
BUN SERPL-MCNC: 12.6 MG/DL — SIGNIFICANT CHANGE UP (ref 8–20)
CALCIUM SERPL-MCNC: 8.6 MG/DL — SIGNIFICANT CHANGE UP (ref 8.4–10.5)
CHLORIDE SERPL-SCNC: 94 MMOL/L — LOW (ref 96–108)
CO2 SERPL-SCNC: 30 MMOL/L — HIGH (ref 22–29)
CREAT SERPL-MCNC: 0.75 MG/DL — SIGNIFICANT CHANGE UP (ref 0.5–1.3)
EGFR: 87 ML/MIN/1.73M2 — SIGNIFICANT CHANGE UP
EOSINOPHIL # BLD AUTO: 0.14 K/UL — SIGNIFICANT CHANGE UP (ref 0–0.5)
EOSINOPHIL NFR BLD AUTO: 0.9 % — SIGNIFICANT CHANGE UP (ref 0–6)
GLUCOSE SERPL-MCNC: 100 MG/DL — HIGH (ref 70–99)
HCT VFR BLD CALC: 33 % — LOW (ref 34.5–45)
HGB BLD-MCNC: 10.1 G/DL — LOW (ref 11.5–15.5)
LYMPHOCYTES # BLD AUTO: 17.1 % — SIGNIFICANT CHANGE UP (ref 13–44)
LYMPHOCYTES # BLD AUTO: 2.74 K/UL — SIGNIFICANT CHANGE UP (ref 1–3.3)
MAGNESIUM SERPL-MCNC: 2.5 MG/DL — SIGNIFICANT CHANGE UP (ref 1.8–2.6)
MCHC RBC-ENTMCNC: 30.1 PG — SIGNIFICANT CHANGE UP (ref 27–34)
MCHC RBC-ENTMCNC: 30.6 GM/DL — LOW (ref 32–36)
MCV RBC AUTO: 98.2 FL — SIGNIFICANT CHANGE UP (ref 80–100)
MONOCYTES # BLD AUTO: 0.82 K/UL — SIGNIFICANT CHANGE UP (ref 0–0.9)
MONOCYTES NFR BLD AUTO: 5.1 % — SIGNIFICANT CHANGE UP (ref 2–14)
NEUTROPHILS # BLD AUTO: 11.77 K/UL — HIGH (ref 1.8–7.4)
NEUTROPHILS NFR BLD AUTO: 73.5 % — SIGNIFICANT CHANGE UP (ref 43–77)
PLATELET # BLD AUTO: 324 K/UL — SIGNIFICANT CHANGE UP (ref 150–400)
POTASSIUM SERPL-MCNC: 3.9 MMOL/L — SIGNIFICANT CHANGE UP (ref 3.5–5.3)
POTASSIUM SERPL-SCNC: 3.9 MMOL/L — SIGNIFICANT CHANGE UP (ref 3.5–5.3)
RBC # BLD: 3.36 M/UL — LOW (ref 3.8–5.2)
RBC # FLD: 14.5 % — SIGNIFICANT CHANGE UP (ref 10.3–14.5)
SODIUM SERPL-SCNC: 136 MMOL/L — SIGNIFICANT CHANGE UP (ref 135–145)
VANCOMYCIN TROUGH SERPL-MCNC: 10 UG/ML — SIGNIFICANT CHANGE UP (ref 10–20)
WBC # BLD: 16.02 K/UL — HIGH (ref 3.8–10.5)
WBC # FLD AUTO: 16.02 K/UL — HIGH (ref 3.8–10.5)

## 2024-06-28 PROCEDURE — 86900 BLOOD TYPING SEROLOGIC ABO: CPT

## 2024-06-28 PROCEDURE — 97116 GAIT TRAINING THERAPY: CPT

## 2024-06-28 PROCEDURE — 99232 SBSQ HOSP IP/OBS MODERATE 35: CPT

## 2024-06-28 PROCEDURE — 99285 EMERGENCY DEPT VISIT HI MDM: CPT

## 2024-06-28 PROCEDURE — 94640 AIRWAY INHALATION TREATMENT: CPT

## 2024-06-28 PROCEDURE — 86850 RBC ANTIBODY SCREEN: CPT

## 2024-06-28 PROCEDURE — 97163 PT EVAL HIGH COMPLEX 45 MIN: CPT

## 2024-06-28 PROCEDURE — 71045 X-RAY EXAM CHEST 1 VIEW: CPT

## 2024-06-28 PROCEDURE — 85610 PROTHROMBIN TIME: CPT

## 2024-06-28 PROCEDURE — 80048 BASIC METABOLIC PNL TOTAL CA: CPT

## 2024-06-28 PROCEDURE — 85025 COMPLETE CBC W/AUTO DIFF WBC: CPT

## 2024-06-28 PROCEDURE — 80053 COMPREHEN METABOLIC PANEL: CPT

## 2024-06-28 PROCEDURE — 86901 BLOOD TYPING SEROLOGIC RH(D): CPT

## 2024-06-28 PROCEDURE — 84100 ASSAY OF PHOSPHORUS: CPT

## 2024-06-28 PROCEDURE — 87040 BLOOD CULTURE FOR BACTERIA: CPT

## 2024-06-28 PROCEDURE — 99024 POSTOP FOLLOW-UP VISIT: CPT

## 2024-06-28 PROCEDURE — 83735 ASSAY OF MAGNESIUM: CPT

## 2024-06-28 PROCEDURE — 85730 THROMBOPLASTIN TIME PARTIAL: CPT

## 2024-06-28 PROCEDURE — 83880 ASSAY OF NATRIURETIC PEPTIDE: CPT

## 2024-06-28 PROCEDURE — 97530 THERAPEUTIC ACTIVITIES: CPT

## 2024-06-28 PROCEDURE — 36415 COLL VENOUS BLD VENIPUNCTURE: CPT

## 2024-06-28 PROCEDURE — 85027 COMPLETE CBC AUTOMATED: CPT

## 2024-06-28 PROCEDURE — 80202 ASSAY OF VANCOMYCIN: CPT

## 2024-06-28 RX ORDER — ENOXAPARIN SODIUM 100 MG/ML
40 INJECTION SUBCUTANEOUS
Qty: 0 | Refills: 0 | DISCHARGE
Start: 2024-06-28

## 2024-06-28 RX ORDER — ATORVASTATIN CALCIUM 20 MG/1
1 TABLET, FILM COATED ORAL
Qty: 0 | Refills: 0 | DISCHARGE
Start: 2024-06-28

## 2024-06-28 RX ORDER — ASPIRIN 325 MG/1
1 TABLET, FILM COATED ORAL
Qty: 0 | Refills: 0 | DISCHARGE
Start: 2024-06-28

## 2024-06-28 RX ORDER — ACETAMINOPHEN 325 MG
2 TABLET ORAL
Qty: 0 | Refills: 0 | DISCHARGE
Start: 2024-06-28

## 2024-06-28 RX ORDER — POTASSIUM CHLORIDE 600 MG/1
20 TABLET, FILM COATED, EXTENDED RELEASE ORAL ONCE
Refills: 0 | Status: COMPLETED | OUTPATIENT
Start: 2024-06-28 | End: 2024-06-28

## 2024-06-28 RX ORDER — NYSTATIN 100000/G
1 POWDER (GRAM) TOPICAL
Qty: 0 | Refills: 0 | DISCHARGE
Start: 2024-06-28

## 2024-06-28 RX ORDER — OXYCODONE HYDROCHLORIDE 100 MG/5ML
1 SOLUTION ORAL
Qty: 0 | Refills: 0 | DISCHARGE
Start: 2024-06-28

## 2024-06-28 RX ORDER — METOPROLOL TARTRATE 50 MG
1 TABLET ORAL
Qty: 0 | Refills: 0 | DISCHARGE
Start: 2024-06-28

## 2024-06-28 RX ORDER — SACCHAROMYCES BOULARDII 50 MG
1 CAPSULE ORAL
Qty: 0 | Refills: 0 | DISCHARGE
Start: 2024-06-28

## 2024-06-28 RX ADMIN — OXYCODONE HYDROCHLORIDE 5 MILLIGRAM(S): 100 SOLUTION ORAL at 00:00

## 2024-06-28 RX ADMIN — NICOTINE POLACRILEX 1 PATCH: 2 LOZENGE ORAL at 08:41

## 2024-06-28 RX ADMIN — POTASSIUM CHLORIDE 20 MILLIEQUIVALENT(S): 600 TABLET, FILM COATED, EXTENDED RELEASE ORAL at 08:42

## 2024-06-28 RX ADMIN — Medication 100 MILLIGRAM(S): at 08:42

## 2024-06-28 RX ADMIN — Medication 25 MILLIGRAM(S): at 06:06

## 2024-06-28 RX ADMIN — Medication 1 MILLIGRAM(S): at 08:42

## 2024-06-28 RX ADMIN — Medication 3 MILLILITER(S): at 07:51

## 2024-06-28 RX ADMIN — Medication 2 PUFF(S): at 08:18

## 2024-06-28 RX ADMIN — TIOTROPIUM BROMIDE 2 PUFF(S): 18 CAPSULE ORAL; RESPIRATORY (INHALATION) at 08:18

## 2024-06-28 RX ADMIN — PIPERACILLIN SODIUM AND TAZOBACTAM SODIUM 25 GRAM(S): 3; .375 INJECTION, POWDER, LYOPHILIZED, FOR SOLUTION INTRAVENOUS at 06:06

## 2024-06-28 RX ADMIN — VANCOMYCIN HYDROCHLORIDE 250 MILLIGRAM(S): KIT at 06:51

## 2024-06-28 RX ADMIN — Medication 1 APPLICATION(S): at 06:03

## 2024-06-28 RX ADMIN — Medication 250 MILLIGRAM(S): at 06:06

## 2024-06-28 RX ADMIN — ASPIRIN 325 MILLIGRAM(S): 325 TABLET, FILM COATED ORAL at 08:42

## 2024-07-01 PROBLEM — T14.8XXD DELAYED WOUND HEALING: Status: ACTIVE | Noted: 2024-06-26

## 2024-07-01 PROBLEM — Z95.1 S/P CABG X 4: Status: ACTIVE | Noted: 2024-06-21

## 2024-07-01 LAB
CULTURE RESULTS: SIGNIFICANT CHANGE UP
CULTURE RESULTS: SIGNIFICANT CHANGE UP
SPECIMEN SOURCE: SIGNIFICANT CHANGE UP
SPECIMEN SOURCE: SIGNIFICANT CHANGE UP

## 2024-07-02 ENCOUNTER — APPOINTMENT (OUTPATIENT)
Dept: CARDIOTHORACIC SURGERY | Facility: CLINIC | Age: 69
End: 2024-07-02
Payer: MEDICARE

## 2024-07-02 ENCOUNTER — TRANSCRIPTION ENCOUNTER (OUTPATIENT)
Age: 69
End: 2024-07-02

## 2024-07-02 ENCOUNTER — APPOINTMENT (OUTPATIENT)
Dept: CARDIOTHORACIC SURGERY | Facility: CLINIC | Age: 69
End: 2024-07-02

## 2024-07-02 VITALS
OXYGEN SATURATION: 99 % | HEIGHT: 65.25 IN | SYSTOLIC BLOOD PRESSURE: 138 MMHG | DIASTOLIC BLOOD PRESSURE: 84 MMHG | TEMPERATURE: 97.6 F | WEIGHT: 192.6 LBS | HEART RATE: 63 BPM | RESPIRATION RATE: 16 BRPM | BODY MASS INDEX: 31.7 KG/M2

## 2024-07-02 DIAGNOSIS — Z95.1 PRESENCE OF AORTOCORONARY BYPASS GRAFT: ICD-10-CM

## 2024-07-02 DIAGNOSIS — T14.8XXD OTHER INJURY OF UNSPECIFIED BODY REGION, SUBSEQUENT ENCOUNTER: ICD-10-CM

## 2024-07-02 PROCEDURE — 99024 POSTOP FOLLOW-UP VISIT: CPT

## 2024-07-02 RX ORDER — SACCHAROMYCES BOULARDII 50 MG
250 CAPSULE ORAL
Refills: 0 | Status: ACTIVE | COMMUNITY
Start: 2024-07-02

## 2024-07-02 RX ORDER — LEVOFLOXACIN 500 MG/1
500 TABLET, FILM COATED ORAL
Refills: 0 | Status: ACTIVE | COMMUNITY
Start: 2024-07-02

## 2024-07-02 RX ORDER — NYSTATIN 100000 U/G
100000 OINTMENT TOPICAL
Refills: 0 | Status: ACTIVE | COMMUNITY
Start: 2024-07-02

## 2024-07-02 RX ORDER — ENOXAPARIN SODIUM 40 MG/.4ML
40 INJECTION SUBCUTANEOUS
Refills: 0 | Status: ACTIVE | COMMUNITY
Start: 2024-07-02

## 2024-07-09 ENCOUNTER — APPOINTMENT (OUTPATIENT)
Dept: CARDIOTHORACIC SURGERY | Facility: CLINIC | Age: 69
End: 2024-07-09
Payer: MEDICARE

## 2024-07-09 ENCOUNTER — APPOINTMENT (OUTPATIENT)
Dept: CARDIOTHORACIC SURGERY | Facility: CLINIC | Age: 69
End: 2024-07-09

## 2024-07-09 VITALS
BODY MASS INDEX: 30.06 KG/M2 | DIASTOLIC BLOOD PRESSURE: 64 MMHG | HEIGHT: 65.25 IN | TEMPERATURE: 97.2 F | HEART RATE: 62 BPM | SYSTOLIC BLOOD PRESSURE: 123 MMHG | WEIGHT: 182.6 LBS | RESPIRATION RATE: 18 BRPM | OXYGEN SATURATION: 99 %

## 2024-07-09 PROCEDURE — 99024 POSTOP FOLLOW-UP VISIT: CPT

## 2024-07-12 ENCOUNTER — INPATIENT (INPATIENT)
Facility: HOSPITAL | Age: 69
LOS: 3 days | Discharge: NURSING, REHAB OR LONG-TERM CARE | DRG: 189 | End: 2024-07-16
Attending: STUDENT IN AN ORGANIZED HEALTH CARE EDUCATION/TRAINING PROGRAM | Admitting: INTERNAL MEDICINE
Payer: MEDICARE

## 2024-07-12 VITALS
HEART RATE: 68 BPM | OXYGEN SATURATION: 87 % | WEIGHT: 154.98 LBS | TEMPERATURE: 99 F | HEIGHT: 65 IN | DIASTOLIC BLOOD PRESSURE: 59 MMHG | RESPIRATION RATE: 19 BRPM | SYSTOLIC BLOOD PRESSURE: 98 MMHG

## 2024-07-12 DIAGNOSIS — J96.01 ACUTE RESPIRATORY FAILURE WITH HYPOXIA: ICD-10-CM

## 2024-07-12 DIAGNOSIS — S02.609A FRACTURE OF MANDIBLE, UNSPECIFIED, INITIAL ENCOUNTER FOR CLOSED FRACTURE: Chronic | ICD-10-CM

## 2024-07-12 DIAGNOSIS — Z95.1 PRESENCE OF AORTOCORONARY BYPASS GRAFT: Chronic | ICD-10-CM

## 2024-07-12 LAB
ALBUMIN SERPL ELPH-MCNC: 2.5 G/DL — LOW (ref 3.3–5)
ALP SERPL-CCNC: 66 U/L — SIGNIFICANT CHANGE UP (ref 40–120)
ALT FLD-CCNC: 18 U/L — SIGNIFICANT CHANGE UP (ref 12–78)
ANION GAP SERPL CALC-SCNC: 3 MMOL/L — LOW (ref 5–17)
APPEARANCE UR: CLEAR — SIGNIFICANT CHANGE UP
APTT BLD: 30.3 SEC — SIGNIFICANT CHANGE UP (ref 24.5–35.6)
AST SERPL-CCNC: 19 U/L — SIGNIFICANT CHANGE UP (ref 15–37)
BACTERIA # UR AUTO: NEGATIVE /HPF — SIGNIFICANT CHANGE UP
BASOPHILS # BLD AUTO: 0.02 K/UL — SIGNIFICANT CHANGE UP (ref 0–0.2)
BASOPHILS NFR BLD AUTO: 0.3 % — SIGNIFICANT CHANGE UP (ref 0–2)
BILIRUB SERPL-MCNC: 0.3 MG/DL — SIGNIFICANT CHANGE UP (ref 0.2–1.2)
BILIRUB UR-MCNC: NEGATIVE — SIGNIFICANT CHANGE UP
BLD GP AB SCN SERPL QL: SIGNIFICANT CHANGE UP
BUN SERPL-MCNC: 11 MG/DL — SIGNIFICANT CHANGE UP (ref 7–23)
CALCIUM SERPL-MCNC: 8.5 MG/DL — SIGNIFICANT CHANGE UP (ref 8.5–10.1)
CAST: 0 /LPF — SIGNIFICANT CHANGE UP (ref 0–4)
CHLORIDE SERPL-SCNC: 99 MMOL/L — SIGNIFICANT CHANGE UP (ref 96–108)
CO2 SERPL-SCNC: 35 MMOL/L — HIGH (ref 22–31)
COLOR SPEC: YELLOW — SIGNIFICANT CHANGE UP
CREAT SERPL-MCNC: 0.59 MG/DL — SIGNIFICANT CHANGE UP (ref 0.5–1.3)
D DIMER BLD IA.RAPID-MCNC: 1046 NG/ML DDU — HIGH
DIFF PNL FLD: NEGATIVE — SIGNIFICANT CHANGE UP
EGFR: 98 ML/MIN/1.73M2 — SIGNIFICANT CHANGE UP
EOSINOPHIL # BLD AUTO: 0.01 K/UL — SIGNIFICANT CHANGE UP (ref 0–0.5)
EOSINOPHIL NFR BLD AUTO: 0.2 % — SIGNIFICANT CHANGE UP (ref 0–6)
FLUAV AG NPH QL: SIGNIFICANT CHANGE UP
FLUBV AG NPH QL: SIGNIFICANT CHANGE UP
GLUCOSE SERPL-MCNC: 165 MG/DL — HIGH (ref 70–99)
GLUCOSE UR QL: NEGATIVE MG/DL — SIGNIFICANT CHANGE UP
HCT VFR BLD CALC: 31.2 % — LOW (ref 34.5–45)
HGB BLD-MCNC: 9.5 G/DL — LOW (ref 11.5–15.5)
IMM GRANULOCYTES NFR BLD AUTO: 1.4 % — HIGH (ref 0–0.9)
INR BLD: 1.08 RATIO — SIGNIFICANT CHANGE UP (ref 0.85–1.18)
KETONES UR-MCNC: NEGATIVE MG/DL — SIGNIFICANT CHANGE UP
LACTATE SERPL-SCNC: 1 MMOL/L — SIGNIFICANT CHANGE UP (ref 0.7–2)
LEUKOCYTE ESTERASE UR-ACNC: ABNORMAL
LYMPHOCYTES # BLD AUTO: 0.83 K/UL — LOW (ref 1–3.3)
LYMPHOCYTES # BLD AUTO: 14.4 % — SIGNIFICANT CHANGE UP (ref 13–44)
MAGNESIUM SERPL-MCNC: 1.8 MG/DL — SIGNIFICANT CHANGE UP (ref 1.6–2.6)
MCHC RBC-ENTMCNC: 28.8 PG — SIGNIFICANT CHANGE UP (ref 27–34)
MCHC RBC-ENTMCNC: 30.4 GM/DL — LOW (ref 32–36)
MCV RBC AUTO: 94.5 FL — SIGNIFICANT CHANGE UP (ref 80–100)
MONOCYTES # BLD AUTO: 0.12 K/UL — SIGNIFICANT CHANGE UP (ref 0–0.9)
MONOCYTES NFR BLD AUTO: 2.1 % — SIGNIFICANT CHANGE UP (ref 2–14)
NEUTROPHILS # BLD AUTO: 4.69 K/UL — SIGNIFICANT CHANGE UP (ref 1.8–7.4)
NEUTROPHILS NFR BLD AUTO: 81.6 % — HIGH (ref 43–77)
NITRITE UR-MCNC: NEGATIVE — SIGNIFICANT CHANGE UP
NT-PROBNP SERPL-SCNC: 1352 PG/ML — HIGH (ref 0–125)
PH UR: 5.5 — SIGNIFICANT CHANGE UP (ref 5–8)
PLATELET # BLD AUTO: 257 K/UL — SIGNIFICANT CHANGE UP (ref 150–400)
POTASSIUM SERPL-MCNC: 4.6 MMOL/L — SIGNIFICANT CHANGE UP (ref 3.5–5.3)
POTASSIUM SERPL-SCNC: 4.6 MMOL/L — SIGNIFICANT CHANGE UP (ref 3.5–5.3)
PROT SERPL-MCNC: 6 GM/DL — SIGNIFICANT CHANGE UP (ref 6–8.3)
PROT UR-MCNC: SIGNIFICANT CHANGE UP MG/DL
PROTHROM AB SERPL-ACNC: 12.2 SEC — SIGNIFICANT CHANGE UP (ref 9.5–13)
RBC # BLD: 3.3 M/UL — LOW (ref 3.8–5.2)
RBC # FLD: 13.9 % — SIGNIFICANT CHANGE UP (ref 10.3–14.5)
RBC CASTS # UR COMP ASSIST: 2 /HPF — SIGNIFICANT CHANGE UP (ref 0–4)
RSV RNA NPH QL NAA+NON-PROBE: SIGNIFICANT CHANGE UP
SARS-COV-2 RNA SPEC QL NAA+PROBE: SIGNIFICANT CHANGE UP
SODIUM SERPL-SCNC: 137 MMOL/L — SIGNIFICANT CHANGE UP (ref 135–145)
SP GR SPEC: 1.02 — SIGNIFICANT CHANGE UP (ref 1–1.03)
SQUAMOUS # UR AUTO: 2 /HPF — SIGNIFICANT CHANGE UP (ref 0–5)
TROPONIN I, HIGH SENSITIVITY RESULT: 18.5 NG/L — SIGNIFICANT CHANGE UP
UROBILINOGEN FLD QL: 0.2 MG/DL — SIGNIFICANT CHANGE UP (ref 0.2–1)
WBC # BLD: 5.75 K/UL — SIGNIFICANT CHANGE UP (ref 3.8–10.5)
WBC # FLD AUTO: 5.75 K/UL — SIGNIFICANT CHANGE UP (ref 3.8–10.5)
WBC UR QL: 4 /HPF — SIGNIFICANT CHANGE UP (ref 0–5)

## 2024-07-12 PROCEDURE — 71045 X-RAY EXAM CHEST 1 VIEW: CPT | Mod: 26

## 2024-07-12 PROCEDURE — 97162 PT EVAL MOD COMPLEX 30 MIN: CPT | Mod: GP

## 2024-07-12 PROCEDURE — 99285 EMERGENCY DEPT VISIT HI MDM: CPT

## 2024-07-12 PROCEDURE — 84100 ASSAY OF PHOSPHORUS: CPT

## 2024-07-12 PROCEDURE — 85025 COMPLETE CBC W/AUTO DIFF WBC: CPT

## 2024-07-12 PROCEDURE — 97530 THERAPEUTIC ACTIVITIES: CPT | Mod: GP

## 2024-07-12 PROCEDURE — 80053 COMPREHEN METABOLIC PANEL: CPT

## 2024-07-12 PROCEDURE — 85027 COMPLETE CBC AUTOMATED: CPT

## 2024-07-12 PROCEDURE — 87899 AGENT NOS ASSAY W/OPTIC: CPT

## 2024-07-12 PROCEDURE — 71275 CT ANGIOGRAPHY CHEST: CPT | Mod: 26,MC

## 2024-07-12 PROCEDURE — 97116 GAIT TRAINING THERAPY: CPT | Mod: GP

## 2024-07-12 PROCEDURE — 87641 MR-STAPH DNA AMP PROBE: CPT

## 2024-07-12 PROCEDURE — 87640 STAPH A DNA AMP PROBE: CPT

## 2024-07-12 PROCEDURE — 82962 GLUCOSE BLOOD TEST: CPT

## 2024-07-12 PROCEDURE — 83735 ASSAY OF MAGNESIUM: CPT

## 2024-07-12 PROCEDURE — 80048 BASIC METABOLIC PNL TOTAL CA: CPT

## 2024-07-12 PROCEDURE — 87449 NOS EACH ORGANISM AG IA: CPT

## 2024-07-12 PROCEDURE — 99223 1ST HOSP IP/OBS HIGH 75: CPT

## 2024-07-12 PROCEDURE — 36415 COLL VENOUS BLD VENIPUNCTURE: CPT

## 2024-07-12 PROCEDURE — 83036 HEMOGLOBIN GLYCOSYLATED A1C: CPT

## 2024-07-12 PROCEDURE — 80061 LIPID PANEL: CPT

## 2024-07-12 PROCEDURE — 93010 ELECTROCARDIOGRAM REPORT: CPT

## 2024-07-12 PROCEDURE — 94640 AIRWAY INHALATION TREATMENT: CPT

## 2024-07-12 RX ORDER — VANCOMYCIN HYDROCHLORIDE 50 MG/ML
1250 KIT ORAL ONCE
Refills: 0 | Status: COMPLETED | OUTPATIENT
Start: 2024-07-12 | End: 2024-07-12

## 2024-07-12 RX ORDER — CEFEPIME 1 G/1
2000 INJECTION, POWDER, FOR SOLUTION INTRAMUSCULAR; INTRAVENOUS EVERY 8 HOURS
Refills: 0 | Status: DISCONTINUED | OUTPATIENT
Start: 2024-07-12 | End: 2024-07-12

## 2024-07-12 RX ORDER — CEFEPIME 1 G/1
2000 INJECTION, POWDER, FOR SOLUTION INTRAMUSCULAR; INTRAVENOUS ONCE
Refills: 0 | Status: COMPLETED | OUTPATIENT
Start: 2024-07-12 | End: 2024-07-12

## 2024-07-12 RX ORDER — FLUTICASONE PROPIONATE AND SALMETEROL 55; 14 UG/1; UG/1
1 POWDER, METERED RESPIRATORY (INHALATION)
Refills: 0 | DISCHARGE

## 2024-07-12 RX ORDER — LEVOFLOXACIN 25 MG/ML
1 INJECTION INTRAVENOUS
Qty: 0 | Refills: 0 | DISCHARGE

## 2024-07-12 RX ORDER — SODIUM CHLORIDE 0.9 % (FLUSH) 0.9 %
1000 SYRINGE (ML) INJECTION ONCE
Refills: 0 | Status: COMPLETED | OUTPATIENT
Start: 2024-07-12 | End: 2024-07-12

## 2024-07-12 RX ADMIN — VANCOMYCIN HYDROCHLORIDE 250 MILLIGRAM(S): KIT at 21:10

## 2024-07-12 RX ADMIN — CEFEPIME 2000 MILLIGRAM(S): 1 INJECTION, POWDER, FOR SOLUTION INTRAMUSCULAR; INTRAVENOUS at 18:27

## 2024-07-12 RX ADMIN — Medication 1000 MILLILITER(S): at 18:27

## 2024-07-12 NOTE — ED ADULT NURSE REASSESSMENT NOTE - NS ED NURSE REASSESS COMMENT FT1
Assumed care at 1900, pt resting in stretcher awake and alert, plan of care discussed, pt states she is feeling better than on arrival. pt has no concerns at this time, comfort and safety maintained

## 2024-07-12 NOTE — ED ADULT NURSE NOTE - OBJECTIVE STATEMENT
pt bibreilly from [facility] c/o sob. pts O2 sat at nursing home was 85%-90% on room air. wound vac on chest present upon arrival to ED. pmh copd

## 2024-07-12 NOTE — ED ADULT NURSE NOTE - NSFALLHARMRISKINTERV_ED_ALL_ED
Assistance OOB with selected safe patient handling equipment if applicable/Assistance with ambulation/Communicate risk of Fall with Harm to all staff, patient, and family/Monitor gait and stability/Provide visual cue: red socks, yellow wristband, yellow gown, etc/Reinforce activity limits and safety measures with patient and family/Bed in lowest position, wheels locked, appropriate side rails in place/Call bell, personal items and telephone in reach/Instruct patient to call for assistance before getting out of bed/chair/stretcher/Non-slip footwear applied when patient is off stretcher/[place] to call system/Physically safe environment - no spills, clutter or unnecessary equipment/Purposeful Proactive Rounding/Room/bathroom lighting operational, light cord in reach

## 2024-07-12 NOTE — ED ADULT NURSE NOTE - CHIEF COMPLAINT QUOTE
pt bibems from [facility]. C/o sob, found to be 85%-90%ra at NH. 87% RA at [facility]. Pt presents with a wound vac on chest. EMS gave 40mg of solumedrol 40mg IM and 2 duo nebs. Stat ekg and monitor. HX of copd. Not on o2 at baseline

## 2024-07-12 NOTE — ED ADULT TRIAGE NOTE - CHIEF COMPLAINT QUOTE
pt bibems from [facility]. C/o sob, found to be 85%-90%ra at NH. 87% RA at [facility]. Pt presents with a wound vac on chest. EMS gave 40mg of solumedrol 40mg IM and 2 duo nebs. Stat ekg and monitor. pt bibems from [facility]. C/o sob, found to be 85%-90%ra at NH. 87% RA at [facility]. Pt presents with a wound vac on chest. EMS gave 40mg of solumedrol 40mg IM and 2 duo nebs. Stat ekg and monitor. HX of copd. Not on o2 at baseline

## 2024-07-12 NOTE — PHARMACOTHERAPY INTERVENTION NOTE - COMMENTS
Medication history complete, reviewed medication with patient provided med list and confirmed with doctor first me hx.

## 2024-07-12 NOTE — ED PROVIDER NOTE - CLINICAL SUMMARY MEDICAL DECISION MAKING FREE TEXT BOX
present
Patient with a history of recent CABG at [facility] complicated by wound infection wound dehiscence COPD diabetes hypertension now in rehab who developed cough about 3 days ago found to have pneumonia at [place] sent over for increasing respiratory distress and hypoxia.  Labs chest x-ray IV antibiotics patient already received Solu-Medrol and DuoNebs prior to arrival will do CTA if D-dimer is high and admit.

## 2024-07-12 NOTE — ED CLERICAL - NS ED CLERK NOTE PRE-ARRIVAL INFORMATION; ADDITIONAL PRE-ARRIVAL INFORMATION
This patient is eligible for (or currently enrolled in) an outpatient care management program available through [employer]. This program can coordinate outpatient follow up and assist the patient in accessing a variety of outpatient resources. If discharged from the ED, the patient will be contacted to see if any additional resources are needed.                                                                             Please call the Nurse Clinical Call Center at [phone] with any questions or for assistance in discharge planning.

## 2024-07-12 NOTE — ED ADULT NURSE REASSESSMENT NOTE - NS ED NURSE REASSESS COMMENT FT1
Pt resting in stretcher sleepy and arousable to voice, plan of care for discussed, pt denies pain and has no concerns at this time. comfort and safety maintained

## 2024-07-12 NOTE — ED PROVIDER NOTE - NS ED MD DISPO DIVISION
Adrianna Berger received IM Depo Provera to the left upper outer side of the deltoid on 7/16/2019    The patient was instructed to get the next injection on 10/1-10/15.    Lot Number: [id number]  Expiration Date: 04/2021  BY TIMOTHY ARANDA      
[facility]

## 2024-07-12 NOTE — ED PROVIDER NOTE - CARE PLAN
Principal Discharge DX:	Acute respiratory failure with hypoxia  Secondary Diagnosis:	Pleural effusion, left  Secondary Diagnosis:	COPD exacerbation   1

## 2024-07-12 NOTE — ED PROVIDER NOTE - OBJECTIVE STATEMENT
Patient is a 69-year-old white female with a history of CABG June 19 hypertension who developed dehiscence of the wound after surgery and required a wound VAC after discharge from from [facility] the patient went to the [place] [facility] went for follow-up at Dr. Santos's office found to have wound dehiscence and was sent back to [facility] where she was admitted treated with that wound VAC and was started on antibiotics and then the patient went to rehab at [facility] over the past 3 days the patient states that she has had an increased cough and shortness of breath she was started on upon arrival to [facility] on 3 L of nasal cannula was found to have a pneumonia by chest x-ray approximately 3 days ago started on antibiotics today sent in for increasing shortness of breath patient's pulse ox on arrival was approximately 87% blood pressure 98/59 pulse of 68 respirations 19 temp 98.6.  No fever no chills at the facility patient states has had a productive cough.Patient was given 2 DuoNebs and 40 mg of Solu-Medrol en route to the emergency room.

## 2024-07-13 DIAGNOSIS — Z95.1 PRESENCE OF AORTOCORONARY BYPASS GRAFT: ICD-10-CM

## 2024-07-13 DIAGNOSIS — I50.32 CHRONIC DIASTOLIC (CONGESTIVE) HEART FAILURE: ICD-10-CM

## 2024-07-13 DIAGNOSIS — Z29.9 ENCOUNTER FOR PROPHYLACTIC MEASURES, UNSPECIFIED: ICD-10-CM

## 2024-07-13 DIAGNOSIS — A41.9 SEPSIS, UNSPECIFIED ORGANISM: ICD-10-CM

## 2024-07-13 DIAGNOSIS — I25.10 ATHEROSCLEROTIC HEART DISEASE OF NATIVE CORONARY ARTERY WITHOUT ANGINA PECTORIS: ICD-10-CM

## 2024-07-13 DIAGNOSIS — E78.5 HYPERLIPIDEMIA, UNSPECIFIED: ICD-10-CM

## 2024-07-13 LAB
A1C WITH ESTIMATED AVERAGE GLUCOSE RESULT: 6.1 % — HIGH (ref 4–5.6)
ALBUMIN SERPL ELPH-MCNC: 2.7 G/DL — LOW (ref 3.3–5)
ALP SERPL-CCNC: 73 U/L — SIGNIFICANT CHANGE UP (ref 40–120)
ALT FLD-CCNC: 18 U/L — SIGNIFICANT CHANGE UP (ref 12–78)
ANION GAP SERPL CALC-SCNC: 5 MMOL/L — SIGNIFICANT CHANGE UP (ref 5–17)
AST SERPL-CCNC: 23 U/L — SIGNIFICANT CHANGE UP (ref 15–37)
BASOPHILS # BLD AUTO: 0.01 K/UL — SIGNIFICANT CHANGE UP (ref 0–0.2)
BASOPHILS NFR BLD AUTO: 0.2 % — SIGNIFICANT CHANGE UP (ref 0–2)
BILIRUB SERPL-MCNC: 0.3 MG/DL — SIGNIFICANT CHANGE UP (ref 0.2–1.2)
BUN SERPL-MCNC: 10 MG/DL — SIGNIFICANT CHANGE UP (ref 7–23)
CALCIUM SERPL-MCNC: 9 MG/DL — SIGNIFICANT CHANGE UP (ref 8.5–10.1)
CHLORIDE SERPL-SCNC: 101 MMOL/L — SIGNIFICANT CHANGE UP (ref 96–108)
CHOLEST SERPL-MCNC: 100 MG/DL — SIGNIFICANT CHANGE UP
CO2 SERPL-SCNC: 32 MMOL/L — HIGH (ref 22–31)
CREAT SERPL-MCNC: 0.54 MG/DL — SIGNIFICANT CHANGE UP (ref 0.5–1.3)
EGFR: 100 ML/MIN/1.73M2 — SIGNIFICANT CHANGE UP
EOSINOPHIL # BLD AUTO: 0.01 K/UL — SIGNIFICANT CHANGE UP (ref 0–0.5)
EOSINOPHIL NFR BLD AUTO: 0.2 % — SIGNIFICANT CHANGE UP (ref 0–6)
ESTIMATED AVERAGE GLUCOSE: 128 MG/DL — HIGH (ref 68–114)
GLUCOSE BLDC GLUCOMTR-MCNC: 108 MG/DL — HIGH (ref 70–99)
GLUCOSE BLDC GLUCOMTR-MCNC: 130 MG/DL — HIGH (ref 70–99)
GLUCOSE BLDC GLUCOMTR-MCNC: 164 MG/DL — HIGH (ref 70–99)
GLUCOSE BLDC GLUCOMTR-MCNC: 185 MG/DL — HIGH (ref 70–99)
GLUCOSE SERPL-MCNC: 116 MG/DL — HIGH (ref 70–99)
HCT VFR BLD CALC: 33.6 % — LOW (ref 34.5–45)
HDLC SERPL-MCNC: 37 MG/DL — LOW
HGB BLD-MCNC: 10.6 G/DL — LOW (ref 11.5–15.5)
IMM GRANULOCYTES NFR BLD AUTO: 1.1 % — HIGH (ref 0–0.9)
LEGIONELLA AG UR QL: NEGATIVE — SIGNIFICANT CHANGE UP
LIPID PNL WITH DIRECT LDL SERPL: 49 MG/DL — SIGNIFICANT CHANGE UP
LYMPHOCYTES # BLD AUTO: 1.24 K/UL — SIGNIFICANT CHANGE UP (ref 1–3.3)
LYMPHOCYTES # BLD AUTO: 23.4 % — SIGNIFICANT CHANGE UP (ref 13–44)
MAGNESIUM SERPL-MCNC: 1.8 MG/DL — SIGNIFICANT CHANGE UP (ref 1.6–2.6)
MCHC RBC-ENTMCNC: 29.5 PG — SIGNIFICANT CHANGE UP (ref 27–34)
MCHC RBC-ENTMCNC: 31.5 GM/DL — LOW (ref 32–36)
MCV RBC AUTO: 93.6 FL — SIGNIFICANT CHANGE UP (ref 80–100)
MONOCYTES # BLD AUTO: 0.25 K/UL — SIGNIFICANT CHANGE UP (ref 0–0.9)
MONOCYTES NFR BLD AUTO: 4.7 % — SIGNIFICANT CHANGE UP (ref 2–14)
MRSA PCR RESULT.: SIGNIFICANT CHANGE UP
NEUTROPHILS # BLD AUTO: 3.74 K/UL — SIGNIFICANT CHANGE UP (ref 1.8–7.4)
NEUTROPHILS NFR BLD AUTO: 70.4 % — SIGNIFICANT CHANGE UP (ref 43–77)
NON HDL CHOLESTEROL: 63 MG/DL — SIGNIFICANT CHANGE UP
PHOSPHATE SERPL-MCNC: 3.4 MG/DL — SIGNIFICANT CHANGE UP (ref 2.5–4.5)
PLATELET # BLD AUTO: 278 K/UL — SIGNIFICANT CHANGE UP (ref 150–400)
POTASSIUM SERPL-MCNC: 4.1 MMOL/L — SIGNIFICANT CHANGE UP (ref 3.5–5.3)
POTASSIUM SERPL-SCNC: 4.1 MMOL/L — SIGNIFICANT CHANGE UP (ref 3.5–5.3)
PROT SERPL-MCNC: 6.4 GM/DL — SIGNIFICANT CHANGE UP (ref 6–8.3)
RBC # BLD: 3.59 M/UL — LOW (ref 3.8–5.2)
RBC # FLD: 13.6 % — SIGNIFICANT CHANGE UP (ref 10.3–14.5)
S AUREUS DNA NOSE QL NAA+PROBE: SIGNIFICANT CHANGE UP
S PNEUM AG UR QL: NEGATIVE — SIGNIFICANT CHANGE UP
SODIUM SERPL-SCNC: 138 MMOL/L — SIGNIFICANT CHANGE UP (ref 135–145)
TRIGL SERPL-MCNC: 64 MG/DL — SIGNIFICANT CHANGE UP
WBC # BLD: 5.31 K/UL — SIGNIFICANT CHANGE UP (ref 3.8–10.5)
WBC # FLD AUTO: 5.31 K/UL — SIGNIFICANT CHANGE UP (ref 3.8–10.5)

## 2024-07-13 PROCEDURE — 99233 SBSQ HOSP IP/OBS HIGH 50: CPT

## 2024-07-13 RX ORDER — DEXTROSE 30 % IN WATER 30 %
15 VIAL (ML) INTRAVENOUS ONCE
Refills: 0 | Status: DISCONTINUED | OUTPATIENT
Start: 2024-07-13 | End: 2024-07-16

## 2024-07-13 RX ORDER — OXYCODONE HYDROCHLORIDE 100 MG/5ML
5 SOLUTION ORAL EVERY 6 HOURS
Refills: 0 | Status: DISCONTINUED | OUTPATIENT
Start: 2024-07-13 | End: 2024-07-16

## 2024-07-13 RX ORDER — OXYCODONE HYDROCHLORIDE 100 MG/5ML
1 SOLUTION ORAL
Refills: 0 | DISCHARGE

## 2024-07-13 RX ORDER — ONDANSETRON HYDROCHLORIDE 2 MG/ML
4 INJECTION INTRAMUSCULAR; INTRAVENOUS EVERY 8 HOURS
Refills: 0 | Status: DISCONTINUED | OUTPATIENT
Start: 2024-07-12 | End: 2024-07-16

## 2024-07-13 RX ORDER — NICOTINE POLACRILEX 2 MG/1
1 LOZENGE ORAL DAILY
Refills: 0 | Status: DISCONTINUED | OUTPATIENT
Start: 2024-07-13 | End: 2024-07-16

## 2024-07-13 RX ORDER — SENNOSIDES 8.6 MG
2 TABLET ORAL AT BEDTIME
Refills: 0 | Status: DISCONTINUED | OUTPATIENT
Start: 2024-07-13 | End: 2024-07-16

## 2024-07-13 RX ORDER — THIAMINE HCL 100 MG
100 TABLET ORAL DAILY
Refills: 0 | Status: DISCONTINUED | OUTPATIENT
Start: 2024-07-13 | End: 2024-07-16

## 2024-07-13 RX ORDER — POTASSIUM CHLORIDE 600 MG/1
10 TABLET, FILM COATED, EXTENDED RELEASE ORAL
Refills: 0 | DISCHARGE

## 2024-07-13 RX ORDER — DEXTROSE 30 % IN WATER 30 %
25 VIAL (ML) INTRAVENOUS ONCE
Refills: 0 | Status: DISCONTINUED | OUTPATIENT
Start: 2024-07-13 | End: 2024-07-16

## 2024-07-13 RX ORDER — NYSTATIN 100000/G
1 POWDER (GRAM) TOPICAL
Refills: 0 | DISCHARGE

## 2024-07-13 RX ORDER — INSULIN LISPRO 100 [IU]/ML
INJECTION, SOLUTION SUBCUTANEOUS AT BEDTIME
Refills: 0 | Status: DISCONTINUED | OUTPATIENT
Start: 2024-07-13 | End: 2024-07-16

## 2024-07-13 RX ORDER — ATORVASTATIN CALCIUM 20 MG/1
80 TABLET, FILM COATED ORAL AT BEDTIME
Refills: 0 | Status: DISCONTINUED | OUTPATIENT
Start: 2024-07-13 | End: 2024-07-16

## 2024-07-13 RX ORDER — TIOTROPIUM BROMIDE 18 UG/1
2 CAPSULE ORAL; RESPIRATORY (INHALATION) EVERY 24 HOURS
Refills: 0 | Status: DISCONTINUED | OUTPATIENT
Start: 2024-07-13 | End: 2024-07-16

## 2024-07-13 RX ORDER — NYSTATIN 100000/G
1 POWDER (GRAM) TOPICAL EVERY 12 HOURS
Refills: 0 | Status: DISCONTINUED | OUTPATIENT
Start: 2024-07-13 | End: 2024-07-16

## 2024-07-13 RX ORDER — MAGNESIUM, ALUMINUM HYDROXIDE 400-400
30 TABLET,CHEWABLE ORAL EVERY 4 HOURS
Refills: 0 | Status: DISCONTINUED | OUTPATIENT
Start: 2024-07-12 | End: 2024-07-16

## 2024-07-13 RX ORDER — DEXTROSE 30 % IN WATER 30 %
12.5 VIAL (ML) INTRAVENOUS ONCE
Refills: 0 | Status: DISCONTINUED | OUTPATIENT
Start: 2024-07-13 | End: 2024-07-16

## 2024-07-13 RX ORDER — VANCOMYCIN HYDROCHLORIDE 50 MG/ML
1250 KIT ORAL EVERY 12 HOURS
Refills: 0 | Status: DISCONTINUED | OUTPATIENT
Start: 2024-07-13 | End: 2024-07-13

## 2024-07-13 RX ORDER — FUROSEMIDE 10 MG/ML
1 INJECTION, SOLUTION INTRAMUSCULAR; INTRAVENOUS
Refills: 0 | DISCHARGE

## 2024-07-13 RX ORDER — DEXTROSE MONOHYDRATE AND SODIUM CHLORIDE 5; .3 G/100ML; G/100ML
1000 INJECTION, SOLUTION INTRAVENOUS
Refills: 0 | Status: DISCONTINUED | OUTPATIENT
Start: 2024-07-13 | End: 2024-07-16

## 2024-07-13 RX ORDER — PREDNISONE 10 MG/1
40 TABLET ORAL DAILY
Refills: 0 | Status: DISCONTINUED | OUTPATIENT
Start: 2024-07-13 | End: 2024-07-16

## 2024-07-13 RX ORDER — METOPROLOL TARTRATE 50 MG
25 TABLET ORAL
Refills: 0 | Status: DISCONTINUED | OUTPATIENT
Start: 2024-07-13 | End: 2024-07-16

## 2024-07-13 RX ORDER — CEFEPIME 1 G/1
1000 INJECTION, POWDER, FOR SOLUTION INTRAMUSCULAR; INTRAVENOUS EVERY 8 HOURS
Refills: 0 | Status: DISCONTINUED | OUTPATIENT
Start: 2024-07-13 | End: 2024-07-13

## 2024-07-13 RX ORDER — CEFEPIME 1 G/1
1000 INJECTION, POWDER, FOR SOLUTION INTRAMUSCULAR; INTRAVENOUS EVERY 8 HOURS
Refills: 0 | Status: DISCONTINUED | OUTPATIENT
Start: 2024-07-13 | End: 2024-07-16

## 2024-07-13 RX ORDER — GLUCAGON HYDROCHLORIDE 1 MG/ML
1 INJECTION, POWDER, FOR SOLUTION INTRAMUSCULAR; INTRAVENOUS; SUBCUTANEOUS ONCE
Refills: 0 | Status: DISCONTINUED | OUTPATIENT
Start: 2024-07-13 | End: 2024-07-16

## 2024-07-13 RX ORDER — ACETAMINOPHEN 325 MG
650 TABLET ORAL EVERY 6 HOURS
Refills: 0 | Status: DISCONTINUED | OUTPATIENT
Start: 2024-07-12 | End: 2024-07-16

## 2024-07-13 RX ORDER — ENOXAPARIN SODIUM 100 MG/ML
40 INJECTION SUBCUTANEOUS EVERY 24 HOURS
Refills: 0 | Status: DISCONTINUED | OUTPATIENT
Start: 2024-07-13 | End: 2024-07-16

## 2024-07-13 RX ORDER — FOLIC ACID
1 POWDER (GRAM) MISCELLANEOUS DAILY
Refills: 0 | Status: DISCONTINUED | OUTPATIENT
Start: 2024-07-13 | End: 2024-07-16

## 2024-07-13 RX ORDER — INSULIN LISPRO 100 [IU]/ML
INJECTION, SOLUTION SUBCUTANEOUS
Refills: 0 | Status: DISCONTINUED | OUTPATIENT
Start: 2024-07-13 | End: 2024-07-16

## 2024-07-13 RX ORDER — POTASSIUM CHLORIDE 600 MG/1
10 TABLET, FILM COATED, EXTENDED RELEASE ORAL DAILY
Refills: 0 | Status: DISCONTINUED | OUTPATIENT
Start: 2024-07-13 | End: 2024-07-16

## 2024-07-13 RX ORDER — BUDESONIDE/FORMOTEROL FUMARATE 160-4.5MCG
2 HFA AEROSOL WITH ADAPTER (GRAM) INHALATION
Refills: 0 | Status: DISCONTINUED | OUTPATIENT
Start: 2024-07-13 | End: 2024-07-16

## 2024-07-13 RX ORDER — IPRATROPIUM BROMIDE AND ALBUTEROL SULFATE .5; 3 MG/3ML; MG/3ML
3 SOLUTION RESPIRATORY (INHALATION) EVERY 6 HOURS
Refills: 0 | Status: DISCONTINUED | OUTPATIENT
Start: 2024-07-13 | End: 2024-07-16

## 2024-07-13 RX ORDER — FUROSEMIDE 10 MG/ML
40 INJECTION, SOLUTION INTRAMUSCULAR; INTRAVENOUS DAILY
Refills: 0 | Status: DISCONTINUED | OUTPATIENT
Start: 2024-07-13 | End: 2024-07-16

## 2024-07-13 RX ORDER — ASPIRIN 325 MG/1
325 TABLET, FILM COATED ORAL DAILY
Refills: 0 | Status: DISCONTINUED | OUTPATIENT
Start: 2024-07-13 | End: 2024-07-16

## 2024-07-13 RX ADMIN — INSULIN LISPRO 2: 100 INJECTION, SOLUTION SUBCUTANEOUS at 11:58

## 2024-07-13 RX ADMIN — Medication 25 MILLIGRAM(S): at 22:24

## 2024-07-13 RX ADMIN — IPRATROPIUM BROMIDE AND ALBUTEROL SULFATE 3 MILLILITER(S): .5; 3 SOLUTION RESPIRATORY (INHALATION) at 19:15

## 2024-07-13 RX ADMIN — ASPIRIN 325 MILLIGRAM(S): 325 TABLET, FILM COATED ORAL at 10:22

## 2024-07-13 RX ADMIN — VANCOMYCIN HYDROCHLORIDE 166.67 MILLIGRAM(S): KIT at 10:21

## 2024-07-13 RX ADMIN — FUROSEMIDE 40 MILLIGRAM(S): 10 INJECTION, SOLUTION INTRAMUSCULAR; INTRAVENOUS at 10:23

## 2024-07-13 RX ADMIN — Medication 25 MILLIGRAM(S): at 10:24

## 2024-07-13 RX ADMIN — Medication 2 PUFF(S): at 09:08

## 2024-07-13 RX ADMIN — Medication 1 MILLIGRAM(S): at 10:24

## 2024-07-13 RX ADMIN — PREDNISONE 40 MILLIGRAM(S): 10 TABLET ORAL at 10:22

## 2024-07-13 RX ADMIN — ATORVASTATIN CALCIUM 80 MILLIGRAM(S): 20 TABLET, FILM COATED ORAL at 22:24

## 2024-07-13 RX ADMIN — Medication 1 APPLICATION(S): at 22:24

## 2024-07-13 RX ADMIN — NICOTINE POLACRILEX 1 PATCH: 2 LOZENGE ORAL at 10:21

## 2024-07-13 RX ADMIN — TIOTROPIUM BROMIDE 2 PUFF(S): 18 CAPSULE ORAL; RESPIRATORY (INHALATION) at 09:08

## 2024-07-13 RX ADMIN — IPRATROPIUM BROMIDE AND ALBUTEROL SULFATE 3 MILLILITER(S): .5; 3 SOLUTION RESPIRATORY (INHALATION) at 09:07

## 2024-07-13 RX ADMIN — Medication 2 PUFF(S): at 20:04

## 2024-07-13 RX ADMIN — Medication 100 MILLIGRAM(S): at 10:24

## 2024-07-13 RX ADMIN — CEFEPIME 1000 MILLIGRAM(S): 1 INJECTION, POWDER, FOR SOLUTION INTRAMUSCULAR; INTRAVENOUS at 06:14

## 2024-07-13 RX ADMIN — POTASSIUM CHLORIDE 10 MILLIEQUIVALENT(S): 600 TABLET, FILM COATED, EXTENDED RELEASE ORAL at 10:23

## 2024-07-13 RX ADMIN — CEFEPIME 1000 MILLIGRAM(S): 1 INJECTION, POWDER, FOR SOLUTION INTRAMUSCULAR; INTRAVENOUS at 22:23

## 2024-07-13 RX ADMIN — ENOXAPARIN SODIUM 40 MILLIGRAM(S): 100 INJECTION SUBCUTANEOUS at 06:14

## 2024-07-13 RX ADMIN — CEFEPIME 1000 MILLIGRAM(S): 1 INJECTION, POWDER, FOR SOLUTION INTRAMUSCULAR; INTRAVENOUS at 13:50

## 2024-07-13 NOTE — H&P ADULT - PROBLEM SELECTOR PLAN 1
Patient hypoxic, hypotensive at rehab.   Possible 2/2 PNA vs COPD exacerbation +/- pleural effusion.   C/w broad spectrum abx.   C/w prednisone 40 mg qd, symbicort and advair.   PRN oxygen, anti-tussives.   ID consult for abx. - f/u septic work up  Cardio thoracic and wound care consult for thoracic wound and wound vac  Consider pulm/IR consult for diagnostic/therapeutic thoracentesis in am.

## 2024-07-13 NOTE — CONSULT NOTE ADULT - ASSESSMENT
PROBLEMS:    Acute Hypoxic respiratory failure  AC COPD exacerbation  L pleural effusion  Possible superimposed pneumonia  Sternal wound  CABGX1  HFpEF  DM    PLAN:    IV cefepime/Vanco   On prednisone 40 mg qd  Symbicort and Spiriva   Titrate NC oxygen  Follow wound care consult  Lasix daily   CT surgery eval may benefit from thoro  DM-on ISS-follow Dxt  Lovenox for DVT ppx  Code status-Full code

## 2024-07-13 NOTE — CONSULT NOTE ADULT - SUBJECTIVE AND OBJECTIVE BOX
HPI:  69F current smoker (2ppd x 50 years), current drinker (2 beers/day), with h/o HLD, macular degeneration, and pulmonary nodules (pulmonology follow up and CT chest @6mo recommended), who recently underwent CABG x4 (LIMA-LAD, SVG-Diagonal, SVG-OM, SVG-RCA) on 6/14/24 by Dr. Richards, was found to have wound dehiscence on 6/26, pt was send to ED, wound was debrided with placement of wound vac and was dicharged to rehab with 2 week course of augmentin on 6/28/24. Patient went to rehab at [facility], over the past 3 days the patient states that she has had an increased cough, has phlegm but feels too weak to expectorate and shortness of breath. She was started on upon arrival to [facility] on 3 L of nasal cannula was found to have a pneumonia by chest x-ray approximately 3 days ago started on levaquin and steroids. Yesterday patient was sent to ED for worsening hypoxia and soft BP. Patient denies any recent fevers, chills, sore throat, chest pain, palpitations. No other acute complaints and ROS otherwise benign.    In ED vitals stable. CBC noted for WBC 5.75, H/H 9.5/31.5, Plt 257. D-dimer 1046, CMP noted for CO2 35, BUN/Cr 11/0.59, Lactate wnl, Pro-BNP 1352. UA sterile. COVID/Flu negative, CTA neg for PE, noted for left sided pleural effusion. Patient recieved vanc/cefepime, and solumedrol in ED. Admitted for COPD exacerbation.  (13 Jul 2024 02:26)      PAST MEDICAL & SURGICAL HISTORY:  Macular degeneration      HLD (hyperlipidemia)      Current smoker      Jaw fracture      Status post coronary artery bypass grafting          Home Medications:  acetaminophen 325 mg oral tablet: 2 tab(s) orally every 6 hours As needed Mild Pain (1 - 3), Moderate Pain (4 - 6) (12 Jul 2024 23:54)  aspirin 325 mg oral delayed release tablet: 1 tab(s) orally once a day (12 Jul 2024 23:54)  atorvastatin 80 mg oral tablet: 1 tab(s) orally once a day (at bedtime) (12 Jul 2024 23:54)  enoxaparin: 40 milligram(s) subcutaneous once a day dvt prophylaxis (12 Jul 2024 23:54)  fluticasone-salmeterol 250 mcg-50 mcg inhalation powder: inhaled as needed for (12 Jul 2024 23:54)  Lasix 40 mg oral tablet: 1 tab(s) orally once a day (13 Jul 2024 02:38)  metoprolol tartrate 25 mg oral tablet: 1 tab(s) orally 2 times a day (12 Jul 2024 23:54)  nystatin 100,000 units/g topical ointment: Apply topically to affected area 2 times a day (13 Jul 2024 02:38)  oxyCODONE 5 mg oral capsule: 1 cap(s) orally every 6 hours as needed for  severe pain (13 Jul 2024 02:38)  potassium chloride: 10 milliequivalent(s) orally once a day (13 Jul 2024 02:38)      MEDICATIONS  (STANDING):  albuterol/ipratropium for Nebulization 3 milliLiter(s) Nebulizer every 6 hours  aspirin enteric coated 325 milliGRAM(s) Oral daily  atorvastatin 80 milliGRAM(s) Oral at bedtime  budesonide 160 MICROgram(s)/formoterol 4.5 MICROgram(s) Inhaler 2 Puff(s) Inhalation two times a day  cefepime  Injectable. 1000 milliGRAM(s) IV Push every 8 hours  dextrose 5%. 1000 milliLiter(s) (100 mL/Hr) IV Continuous <Continuous>  dextrose 5%. 1000 milliLiter(s) (50 mL/Hr) IV Continuous <Continuous>  dextrose 50% Injectable 12.5 Gram(s) IV Push once  dextrose 50% Injectable 25 Gram(s) IV Push once  dextrose 50% Injectable 25 Gram(s) IV Push once  enoxaparin Injectable 40 milliGRAM(s) SubCutaneous every 24 hours  folic acid 1 milliGRAM(s) Oral daily  furosemide    Tablet 40 milliGRAM(s) Oral daily  glucagon  Injectable 1 milliGRAM(s) IntraMuscular once  insulin lispro (ADMELOG) corrective regimen sliding scale   SubCutaneous three times a day before meals  insulin lispro (ADMELOG) corrective regimen sliding scale   SubCutaneous at bedtime  metoprolol tartrate 25 milliGRAM(s) Oral two times a day  nicotine -   7 mG/24Hr(s) Patch 1 Patch Transdermal daily  nystatin Ointment 1 Application(s) Topical every 12 hours  potassium chloride    Tablet ER 10 milliEquivalent(s) Oral daily  predniSONE   Tablet 40 milliGRAM(s) Oral daily  thiamine 100 milliGRAM(s) Oral daily  tiotropium 2.5 MICROgram(s) Inhaler 2 Puff(s) Inhalation every 24 hours  vancomycin  IVPB 1250 milliGRAM(s) IV Intermittent every 12 hours    MEDICATIONS  (PRN):  acetaminophen     Tablet .. 650 milliGRAM(s) Oral every 6 hours PRN Temp greater or equal to 38C (100.4F), Mild Pain (1 - 3)  aluminum hydroxide/magnesium hydroxide/simethicone Suspension 30 milliLiter(s) Oral every 4 hours PRN Dyspepsia  dextrose Oral Gel 15 Gram(s) Oral once PRN Blood Glucose LESS THAN 70 milliGRAM(s)/deciliter  melatonin 3 milliGRAM(s) Oral at bedtime PRN Insomnia  ondansetron Injectable 4 milliGRAM(s) IV Push every 8 hours PRN Nausea and/or Vomiting  oxyCODONE    IR 5 milliGRAM(s) Oral every 6 hours PRN Severe Pain (7 - 10)  senna 2 Tablet(s) Oral at bedtime PRN for constipation      Allergies    No Known Allergies    Intolerances        SOCIAL HISTORY: Denies tobacco, etoh abuse or illicit drug use    FAMILY HISTORY:  Family history of early CAD (Mother)        Vital Signs Last 24 Hrs  T(C): 36.5 (13 Jul 2024 08:08), Max: 37 (12 Jul 2024 17:24)  T(F): 97.7 (13 Jul 2024 08:08), Max: 98.6 (12 Jul 2024 17:24)  HR: 81 (13 Jul 2024 08:08) (60 - 81)  BP: 168/67 (13 Jul 2024 08:08) (98/59 - 170/64)  BP(mean): 93 (12 Jul 2024 23:20) (93 - 97)  RR: 19 (13 Jul 2024 08:08) (14 - 22)  SpO2: 100% (13 Jul 2024 08:08) (87% - 100%)    Parameters below as of 13 Jul 2024 08:08  Patient On (Oxygen Delivery Method): mask, Venturi    O2 Concentration (%): 35        REVIEW OF SYSTEMS:    CONSTITUTIONAL:  As per HPI.  HEENT:  Eyes:  No diplopia or blurred vision. ENT:  No earache, sore throat or runny nose.  CARDIOVASCULAR:  No pressure, squeezing, tightness, heaviness or aching about the chest, neck, axilla or epigastrium.  RESPIRATORY:  No cough, shortness of breath, PND or orthopnea.  GASTROINTESTINAL:  No nausea, vomiting or diarrhea.  GENITOURINARY:  No dysuria, frequency or urgency.  MUSCULOSKELETAL:  As per HPI.  SKIN:  No change in skin, hair or nails.  NEUROLOGIC:  No paresthesias, fasciculations, seizures or weakness.  PSYCHIATRIC:  No disorder of thought or mood.  ENDOCRINE:  No heat or cold intolerance, polyuria or polydipsia.  HEMATOLOGICAL:  No easy bruising or bleedings:  .     PHYSICAL EXAMINATION:    GENERAL APPEARANCE:  Pt. is not currently dyspneic, in no distress. Pt. is alert, oriented, and pleasant.  HEENT:  Pupils are normal and react normally. No icterus. Mucous membranes well colored.  NECK:  Supple. No lymphadenopathy. Jugular venous pressure not elevated. Carotids equal.   HEART:   The cardiac impulse has a normal quality. Regular. Normal S1 and S2. There are no murmurs, rubs or gallops noted  CHEST:  Chest is clear to auscultation. Normal respiratory effort.  ABDOMEN:  Soft and nontender.   EXTREMITIES:  There is no cyanosis, clubbing or edema.   SKIN:  No rash or significant lesions are noted.    LABS:                        10.6   5.31  )-----------( 278      ( 13 Jul 2024 06:14 )             33.6     07-13    138  |  101  |  10  ----------------------------<  116<H>  4.1   |  32<H>  |  0.54    Ca    9.0      13 Jul 2024 06:14  Phos  3.4     07-13  Mg     1.8     07-13    TPro  6.4  /  Alb  2.7<L>  /  TBili  0.3  /  DBili  x   /  AST  23  /  ALT  18  /  AlkPhos  73  07-13    LIVER FUNCTIONS - ( 13 Jul 2024 06:14 )  Alb: 2.7 g/dL / Pro: 6.4 gm/dL / ALK PHOS: 73 U/L / ALT: 18 U/L / AST: 23 U/L / GGT: x           PT/INR - ( 12 Jul 2024 17:59 )   PT: 12.2 sec;   INR: 1.08 ratio         PTT - ( 12 Jul 2024 17:59 )  PTT:30.3 sec      Urinalysis Basic - ( 13 Jul 2024 06:14 )    Color: x / Appearance: x / SG: x / pH: x  Gluc: 116 mg/dL / Ketone: x  / Bili: x / Urobili: x   Blood: x / Protein: x / Nitrite: x   Leuk Esterase: x / RBC: x / WBC x   Sq Epi: x / Non Sq Epi: x / Bacteria: x          Urinalysis with Rflx Culture (collected 07-12-24 @ 20:25)        RADIOLOGY & ADDITIONAL STUDIES:        HPI:    69F current smoker (2ppd x 50 years), current drinker (2 beers/day), with h/o HLD, macular degeneration, and pulmonary nodules (pulmonology follow up and CT chest @6mo recommended), who recently underwent CABG x4 (LIMA-LAD, SVG-Diagonal, SVG-OM, SVG-RCA) on 6/14/24 by Dr. Richards, was found to have wound dehiscence on 6/26, pt was send to ED, wound was debrided with placement of wound vac and was dicharged to rehab with 2 week course of augmentin on 6/28/24. Patient went to rehab at [facility], over the past 3 days the patient states that she has had an increased cough, has phlegm but feels too weak to expectorate and shortness of breath. She was started on upon arrival to [facility] on 3 L of nasal cannula was found to have a pneumonia by chest x-ray approximately 3 days ago started on levaquin and steroids. Yesterday patient was sent to ED for worsening hypoxia and soft BP. Patient denies any recent fevers, chills, sore throat, chest pain, palpitations. No other acute complaints and ROS otherwise benign. In ED vitals stable. CBC noted for WBC 5.75, H/H 9.5/31.5, Plt 257. D-dimer 1046, CMP noted for CO2 35, BUN/Cr 11/0.59, Lactate wnl, Pro-BNP 1352. UA sterile. COVID/Flu negative, CTA neg for PE, noted for left sided pleural effusion. Patient recieved vanc/cefepime, and solumedrol in ED. Admitted for COPD exacerbation.  pat seen for pulmonary eval, sitting in bed, no distress.      PAST MEDICAL & SURGICAL HISTORY:  Macular degeneration      HLD (hyperlipidemia)      Current smoker      Jaw fracture      Status post coronary artery bypass grafting          Home Medications:  acetaminophen 325 mg oral tablet: 2 tab(s) orally every 6 hours As needed Mild Pain (1 - 3), Moderate Pain (4 - 6) (12 Jul 2024 23:54)  aspirin 325 mg oral delayed release tablet: 1 tab(s) orally once a day (12 Jul 2024 23:54)  atorvastatin 80 mg oral tablet: 1 tab(s) orally once a day (at bedtime) (12 Jul 2024 23:54)  enoxaparin: 40 milligram(s) subcutaneous once a day dvt prophylaxis (12 Jul 2024 23:54)  fluticasone-salmeterol 250 mcg-50 mcg inhalation powder: inhaled as needed for (12 Jul 2024 23:54)  Lasix 40 mg oral tablet: 1 tab(s) orally once a day (13 Jul 2024 02:38)  metoprolol tartrate 25 mg oral tablet: 1 tab(s) orally 2 times a day (12 Jul 2024 23:54)  nystatin 100,000 units/g topical ointment: Apply topically to affected area 2 times a day (13 Jul 2024 02:38)  oxyCODONE 5 mg oral capsule: 1 cap(s) orally every 6 hours as needed for  severe pain (13 Jul 2024 02:38)  potassium chloride: 10 milliequivalent(s) orally once a day (13 Jul 2024 02:38)      MEDICATIONS  (STANDING):  albuterol/ipratropium for Nebulization 3 milliLiter(s) Nebulizer every 6 hours  aspirin enteric coated 325 milliGRAM(s) Oral daily  atorvastatin 80 milliGRAM(s) Oral at bedtime  budesonide 160 MICROgram(s)/formoterol 4.5 MICROgram(s) Inhaler 2 Puff(s) Inhalation two times a day  cefepime  Injectable. 1000 milliGRAM(s) IV Push every 8 hours  dextrose 5%. 1000 milliLiter(s) (100 mL/Hr) IV Continuous <Continuous>  dextrose 5%. 1000 milliLiter(s) (50 mL/Hr) IV Continuous <Continuous>  dextrose 50% Injectable 12.5 Gram(s) IV Push once  dextrose 50% Injectable 25 Gram(s) IV Push once  dextrose 50% Injectable 25 Gram(s) IV Push once  enoxaparin Injectable 40 milliGRAM(s) SubCutaneous every 24 hours  folic acid 1 milliGRAM(s) Oral daily  furosemide    Tablet 40 milliGRAM(s) Oral daily  glucagon  Injectable 1 milliGRAM(s) IntraMuscular once  insulin lispro (ADMELOG) corrective regimen sliding scale   SubCutaneous three times a day before meals  insulin lispro (ADMELOG) corrective regimen sliding scale   SubCutaneous at bedtime  metoprolol tartrate 25 milliGRAM(s) Oral two times a day  nicotine -   7 mG/24Hr(s) Patch 1 Patch Transdermal daily  nystatin Ointment 1 Application(s) Topical every 12 hours  potassium chloride    Tablet ER 10 milliEquivalent(s) Oral daily  predniSONE   Tablet 40 milliGRAM(s) Oral daily  thiamine 100 milliGRAM(s) Oral daily  tiotropium 2.5 MICROgram(s) Inhaler 2 Puff(s) Inhalation every 24 hours  vancomycin  IVPB 1250 milliGRAM(s) IV Intermittent every 12 hours    MEDICATIONS  (PRN):  acetaminophen     Tablet .. 650 milliGRAM(s) Oral every 6 hours PRN Temp greater or equal to 38C (100.4F), Mild Pain (1 - 3)  aluminum hydroxide/magnesium hydroxide/simethicone Suspension 30 milliLiter(s) Oral every 4 hours PRN Dyspepsia  dextrose Oral Gel 15 Gram(s) Oral once PRN Blood Glucose LESS THAN 70 milliGRAM(s)/deciliter  melatonin 3 milliGRAM(s) Oral at bedtime PRN Insomnia  ondansetron Injectable 4 milliGRAM(s) IV Push every 8 hours PRN Nausea and/or Vomiting  oxyCODONE    IR 5 milliGRAM(s) Oral every 6 hours PRN Severe Pain (7 - 10)  senna 2 Tablet(s) Oral at bedtime PRN for constipation      Allergies    No Known Allergies    Intolerances        SOCIAL HISTORY: Denies tobacco, etoh abuse or illicit drug use    FAMILY HISTORY:  Family history of early CAD (Mother)        Vital Signs Last 24 Hrs  T(C): 36.5 (13 Jul 2024 08:08), Max: 37 (12 Jul 2024 17:24)  T(F): 97.7 (13 Jul 2024 08:08), Max: 98.6 (12 Jul 2024 17:24)  HR: 81 (13 Jul 2024 08:08) (60 - 81)  BP: 168/67 (13 Jul 2024 08:08) (98/59 - 170/64)  BP(mean): 93 (12 Jul 2024 23:20) (93 - 97)  RR: 19 (13 Jul 2024 08:08) (14 - 22)  SpO2: 100% (13 Jul 2024 08:08) (87% - 100%)    Parameters below as of 13 Jul 2024 08:08  Patient On (Oxygen Delivery Method): mask, Venturi    O2 Concentration (%): 35        REVIEW OF SYSTEMS:    CONSTITUTIONAL:  As per HPI.  HEENT:  Eyes:  No diplopia or blurred vision. ENT:  No earache, sore throat or runny nose.  CARDIOVASCULAR:  No pressure, squeezing, tightness, heaviness or aching about the chest, neck, axilla or epigastrium.  RESPIRATORY:  No cough, +shortness of breath, PND or orthopnea.  GASTROINTESTINAL:  No nausea, vomiting or diarrhea.  GENITOURINARY:  No dysuria, frequency or urgency.  MUSCULOSKELETAL:  As per HPI.  SKIN:  No change in skin, hair or nails.  NEUROLOGIC:  No paresthesias, fasciculations, seizures or weakness.  PSYCHIATRIC:  No disorder of thought or mood.  ENDOCRINE:  No heat or cold intolerance, polyuria or polydipsia.  HEMATOLOGICAL:  No easy bruising or bleedings:  .     PHYSICAL EXAMINATION:    GENERAL APPEARANCE:  Pt. is not currently dyspneic, in no distress. Pt. is alert, oriented, and pleasant.  HEENT:  Pupils are normal and react normally. No icterus. Mucous membranes well colored.  NECK:  Supple. No lymphadenopathy. Jugular venous pressure not elevated. Carotids equal.   HEART:   The cardiac impulse has a normal quality. Regular. Normal S1 and S2. There are no murmurs, rubs or gallops noted  CHEST:  Chest crackles to auscultation. Normal respiratory effort.  ABDOMEN:  Soft and nontender.   EXTREMITIES:  There is no cyanosis, clubbing or edema.   SKIN:  No rash or significant lesions are noted.    LABS:                        10.6   5.31  )-----------( 278      ( 13 Jul 2024 06:14 )             33.6     07-13    138  |  101  |  10  ----------------------------<  116<H>  4.1   |  32<H>  |  0.54    Ca    9.0      13 Jul 2024 06:14  Phos  3.4     07-13  Mg     1.8     07-13    TPro  6.4  /  Alb  2.7<L>  /  TBili  0.3  /  DBili  x   /  AST  23  /  ALT  18  /  AlkPhos  73  07-13    LIVER FUNCTIONS - ( 13 Jul 2024 06:14 )  Alb: 2.7 g/dL / Pro: 6.4 gm/dL / ALK PHOS: 73 U/L / ALT: 18 U/L / AST: 23 U/L / GGT: x           PT/INR - ( 12 Jul 2024 17:59 )   PT: 12.2 sec;   INR: 1.08 ratio         PTT - ( 12 Jul 2024 17:59 )  PTT:30.3 sec      Urinalysis Basic - ( 13 Jul 2024 06:14 )    Color: x / Appearance: x / SG: x / pH: x  Gluc: 116 mg/dL / Ketone: x  / Bili: x / Urobili: x   Blood: x / Protein: x / Nitrite: x   Leuk Esterase: x / RBC: x / WBC x   Sq Epi: x / Non Sq Epi: x / Bacteria: x          Urinalysis with Rflx Culture (collected 07-12-24 @ 20:25)        RADIOLOGY & ADDITIONAL STUDIES:     CT Angio Chest PE Protocol w/ IV Cont (07.12.24 @ 21:40) >  IMPRESSION:  No pulmonary embolus.    Small to moderate left pleural effusion.

## 2024-07-13 NOTE — H&P ADULT - PROBLEM SELECTOR PLAN 2
Patient on aspirin 325mg qd, likely post-op med.   Likely can de-escalate to aspirin 81?   C/w statin.

## 2024-07-13 NOTE — CONSULT NOTE ADULT - ASSESSMENT
69F current smoker (2ppd x 50 years), current drinker (2 beers/day), with h/o HLD, macular degeneration, and pulmonary nodules (pulmonology follow up and CT chest @6mo recommended), who recently underwent CABG x4 (LIMA-LAD, SVG-Diagonal, SVG-OM, SVG-RCA) on 6/14/24 by Dr. Richards, was found to have wound dehiscence on 6/26, pt was send to ED, wound was debrided with placement of wound vac and was discharged to rehab with 2 week course of augmentin on 6/28/24. Patient went to rehab at [facility], over the past 3 days the patient states that she has had an increased cough, has phlegm but feels too weak to expectorate and shortness of breath. She was started on upon arrival to [facility] on 3 L of nasal cannula was found to have a pneumonia by chest x-ray approximately 3 days ago started on levaquin and steroids.  patient was sent to ED for worsening hypoxia and soft BP. In ED vitals stable. CBC noted for WBC 5.75, H/H 9.5/31.5, Plt 257. D-dimer 1046, CMP noted for CO2 35, BUN/Cr 11/0.59, Lactate wnl, Pro-BNP 1352. UA sterile. COVID/Flu negative, CTA neg for PE, noted for left sided pleural effusion. Patient recieved vanc/cefepime, and solumedrol in ED. Admitted for COPD exacerbation.     1. Acute respiratory failure. COPD exacerbation. L pleural effusion. Possible superimposed pneumonia  - imaging reviewed  - agree with IV cefepime 1gmq8h  - dc vancomycin change to doxycycline 100mg BID   - continue with antibiotic coverage   - on steroids  - pulmonary eval  - f/u cultures check sputum cx   - tolerating abx well so far; no side effects noted  - reason for abx use and side effects reviewed with patient  - supportive care  - fu cbc    Clinical team may change from intravenous to oral antibiotics when the following criteria are met:   1. Patient is clinically improving/stable       a)	Improved signs and symptoms of infection from initial presentation       b)	Afebrile for 24 hours       c)	Leukocytosis trending towards normal range   2. Patient is tolerating oral intake   3. Initial/repeat blood cultures are negative     Cannot advise changing to oral antibiotic therapy until culture sensitivity is available.   69F current smoker (2ppd x 50 years), current drinker (2 beers/day), with h/o HLD, macular degeneration, and pulmonary nodules (pulmonology follow up and CT chest @6mo recommended), who recently underwent CABG x4 (LIMA-LAD, SVG-Diagonal, SVG-OM, SVG-RCA) on 6/14/24 by Dr. Richards, was found to have wound dehiscence on 6/26, pt was send to ED, wound was debrided with placement of wound vac and was discharged to rehab with 2 week course of augmentin on 6/28/24. Patient went to rehab at [facility], over the past 3 days the patient states that she has had an increased cough, has phlegm but feels too weak to expectorate and shortness of breath. She was started on upon arrival to [facility] on 3 L of nasal cannula was found to have a pneumonia by chest x-ray approximately 3 days ago started on levaquin and steroids.  patient was sent to ED for worsening hypoxia and soft BP. In ED vitals stable. CBC noted for WBC 5.75, H/H 9.5/31.5, Plt 257. D-dimer 1046, CMP noted for CO2 35, BUN/Cr 11/0.59, Lactate wnl, Pro-BNP 1352. UA sterile. COVID/Flu negative, CTA neg for PE, noted for left sided pleural effusion. Patient recieved vanc/cefepime, and solumedrol in ED. Admitted for COPD exacerbation.     1. Acute respiratory failure. COPD exacerbation. L pleural effusion. Possible superimposed pneumonia. Sternal wound   - imaging reviewed  - agree with IV cefepime 1gmq8h  - dc vancomycin change to doxycycline 100mg BID   - continue with antibiotic coverage   - on steroids  - pulmonary eval  - f/u cultures check sputum cx   - tolerating abx well so far; no side effects noted  - reason for abx use and side effects reviewed with patient  - sternal wound - wound care f/u, surgery f.u noted   - supportive care  - fu cbc    Clinical team may change from intravenous to oral antibiotics when the following criteria are met:   1. Patient is clinically improving/stable       a)	Improved signs and symptoms of infection from initial presentation       b)	Afebrile for 24 hours       c)	Leukocytosis trending towards normal range   2. Patient is tolerating oral intake   3. Initial/repeat blood cultures are negative     Cannot advise changing to oral antibiotic therapy until culture sensitivity is available.

## 2024-07-13 NOTE — H&P ADULT - NSHPLABSRESULTS_GEN_ALL_CORE
LABS:  cret                        9.5    5.75  )-----------( 257      ( 12 Jul 2024 21:50 )             31.2     07-12    137  |  99  |  11  ----------------------------<  165<H>  4.6   |  35<H>  |  0.59    Ca    8.5      12 Jul 2024 21:50  Mg     1.8     07-12    TPro  6.0  /  Alb  2.5<L>  /  TBili  0.3  /  DBili  x   /  AST  19  /  ALT  18  /  AlkPhos  66  07-12    PT/INR - ( 12 Jul 2024 17:59 )   PT: 12.2 sec;   INR: 1.08 ratio         PTT - ( 12 Jul 2024 17:59 )  PTT:30.3 sec      Urinalysis with Rflx Culture (collected 07-12-24 @ 20:25)    < from: CT Angio Chest PE Protocol w/ IV Cont (07.12.24 @ 21:40) >    IMPRESSION:  No pulmonary embolus.    Small to moderate left pleural effusion.        --- End of Report ---    < end of copied text >

## 2024-07-13 NOTE — H&P ADULT - PROBLEM SELECTOR PROBLEM 6
How Severe Is Your Acne?: mild Is This A New Presentation, Or A Follow-Up?: Follow Up Acne Additional Comments (Use Complete Sentences): Breakouts weekly. Pt uses the Acticlate daily. Uses the epiduo nightly. Aczone daily. \\n\\nDenies history of depression , or IBD \\nDenies fmhx of IBD Prophylactic measure

## 2024-07-13 NOTE — H&P ADULT - HISTORY OF PRESENT ILLNESS
69F current smoker (2ppd x 50 years), current drinker (2 beers/day), with h/o HLD, macular degeneration, and pulmonary nodules (pulmonology follow up and CT chest @6mo recommended), who recently underwent CABG x4 (LIMA-LAD, SVG-Diagonal, SVG-OM, SVG-RCA) on 6/14/24 by Dr. Richards, was found to have wound dehiscence on 6/26, pt was send to ED, wound was debrided with placement of wound vac and was dicharged to rehab with 2 week course of augmentin on 6/28/24. Patient went to rehab at [facility], over the past 3 days the patient states that she has had an increased cough, has phlegm but feels too weak to expectorate and shortness of breath. She was started on upon arrival to [facility] on 3 L of nasal cannula was found to have a pneumonia by chest x-ray approximately 3 days ago started on levaquin and steroids. Yesterday patient was sent to ED for worsening hypoxia and soft BP. Patient denies any recent fevers, chills, sore throat, chest pain, palpitations. No other acute complaints and ROS otherwise benign.    In ED vitals stable. CBC noted for WBC 5.75, H/H 9.5/31.5, Plt 257. D-dimer 1046, CMP noted for CO2 35, BUN/Cr 11/0.59, Lactate wnl, Pro-BNP 1352. UA sterile. COVID/Flu negative, CTA neg for PE, noted for left sided pleural effusion. Patient recieved vanc/cefepime, and solumedrol in ED. Admitted for COPD exacerbation.

## 2024-07-13 NOTE — H&P ADULT - NSHPPHYSICALEXAM_GEN_ALL_CORE
T(C): 36.7 (07-13-24 @ 02:11), Max: 37 (07-12-24 @ 17:24)  HR: 61 (07-13-24 @ 02:11) (60 - 79)  BP: 170/64 (07-13-24 @ 02:11) (98/59 - 170/64)  RR: 19 (07-13-24 @ 02:11) (14 - 22)  SpO2: 95% (07-13-24 @ 02:11) (87% - 98%)    General: non-toxic  HEENT: non-traumatic, perrla, eomi  Cardio: s1s2 regular rate and rhythm  Lungs: mild tachypnea, squeaky lung sounds.  Abdomen: Soft, non-tender, non-distended  Neuro: AOx4  Ext: Pulses +2

## 2024-07-13 NOTE — H&P ADULT - PROBLEM SELECTOR PLAN 5
DVT ppx: lovenox.   Fall and aspiration precautions.  Turn and reposition q2h to prevent bedsores  Skin checks as per RN C/w statin

## 2024-07-13 NOTE — CONSULT NOTE ADULT - SUBJECTIVE AND OBJECTIVE BOX
HPI:  69F current smoker (2ppd x 50 years), current drinker (2 beers/day), with h/o HLD, macular degeneration, and pulmonary nodules (pulmonology follow up and CT chest @6mo recommended), who recently underwent CABG x4 (LIMA-LAD, SVG-Diagonal, SVG-OM, SVG-RCA) on 24 by Dr. Richards, was found to have wound dehiscence on , pt was send to ED, wound was debrided with placement of wound vac and was dicharged to rehab with 2 week course of augmentin on 24. Patient went to rehab at [facility], over the past 3 days the patient states that she has had an increased cough, has phlegm but feels too weak to expectorate and shortness of breath. She was started on upon arrival to [facility] on 3 L of nasal cannula was found to have a pneumonia by chest x-ray approximately 3 days ago started on levaquin and steroids.  patient was sent to ED for worsening hypoxia and soft BP. In ED vitals stable. CBC noted for WBC 5.75, H/H 9.5/31.5, Plt 257. D-dimer 1046, CMP noted for CO2 35, BUN/Cr 11/0.59, Lactate wnl, Pro-BNP 1352. UA sterile. COVID/Flu negative, CTA neg for PE, noted for left sided pleural effusion. Patient recieved vanc/cefepime, and solumedrol in ED. Admitted for COPD exacerbation.     PMH: as above  PSH: as above  Meds: per reconciliation sheet, noted below  MEDICATIONS  (STANDING):  albuterol/ipratropium for Nebulization 3 milliLiter(s) Nebulizer every 6 hours  aspirin enteric coated 325 milliGRAM(s) Oral daily  atorvastatin 80 milliGRAM(s) Oral at bedtime  budesonide 160 MICROgram(s)/formoterol 4.5 MICROgram(s) Inhaler 2 Puff(s) Inhalation two times a day  cefepime  Injectable. 1000 milliGRAM(s) IV Push every 8 hours  dextrose 5%. 1000 milliLiter(s) (100 mL/Hr) IV Continuous <Continuous>  dextrose 5%. 1000 milliLiter(s) (50 mL/Hr) IV Continuous <Continuous>  dextrose 50% Injectable 25 Gram(s) IV Push once  dextrose 50% Injectable 25 Gram(s) IV Push once  dextrose 50% Injectable 12.5 Gram(s) IV Push once  enoxaparin Injectable 40 milliGRAM(s) SubCutaneous every 24 hours  folic acid 1 milliGRAM(s) Oral daily  furosemide    Tablet 40 milliGRAM(s) Oral daily  glucagon  Injectable 1 milliGRAM(s) IntraMuscular once  insulin lispro (ADMELOG) corrective regimen sliding scale   SubCutaneous three times a day before meals  insulin lispro (ADMELOG) corrective regimen sliding scale   SubCutaneous at bedtime  metoprolol tartrate 25 milliGRAM(s) Oral two times a day  nicotine -   7 mG/24Hr(s) Patch 1 Patch Transdermal daily  nystatin Ointment 1 Application(s) Topical every 12 hours  potassium chloride    Tablet ER 10 milliEquivalent(s) Oral daily  predniSONE   Tablet 40 milliGRAM(s) Oral daily  thiamine 100 milliGRAM(s) Oral daily  tiotropium 2.5 MICROgram(s) Inhaler 2 Puff(s) Inhalation every 24 hours  vancomycin  IVPB 1250 milliGRAM(s) IV Intermittent every 12 hours    Allergies    No Known Allergies    Intolerances      Social: no smoking, no alcohol, no illegal drugs; no recent travel, no exposure to TB  FAMILY HISTORY:  Family history of early CAD (Mother)       no history of premature cardiovascular disease in first degree relatives    ROS: the patient denies fever, no chills, no HA, no dizziness, no sore throat, no blurry vision, no CP, no palpitations, + SOB, + cough, no abdominal pain, no diarrhea, no N/V, no dysuria, no leg pain, no claudication, no rash, no joint aches, no rectal pain or bleeding, no night sweats      All other systems reviewed and are negative    Vital Signs Last 24 Hrs  T(C): 36.5 (2024 08:08), Max: 37 (2024 17:24)  T(F): 97.7 (2024 08:08), Max: 98.6 (2024 17:24)  HR: 81 (2024 08:08) (60 - 81)  BP: 168/67 (2024 08:08) (98/59 - 170/64)  BP(mean): 93 (2024 23:20) (93 - 97)  RR: 19 (2024 08:08) (14 - 22)  SpO2: 100% (2024 08:08) (87% - 100%)    Parameters below as of 2024 08:08  Patient On (Oxygen Delivery Method): mask, Venturi    O2 Concentration (%): 35  Daily Height in cm: 165.1 (2024 17:24)    Daily Weight in k.5 (2024 02:11)    PE:  Constitutional: NAD   HEENT: NC/AT, EOMI, PERRLA, conjunctivae clear; ears and nose atraumatic; pharynx benign  Neck: supple; thyroid not palpable  Back: no tenderness  Respiratory: decreased breath sounds  Cardiovascular: S1S2 regular, no murmurs  Abdomen: soft, not tender, not distended, positive BS; liver and spleen WNL  Genitourinary: no suprapubic tenderness  Lymphatic: no LN palpable  Musculoskeletal: no muscle tenderness, no joint swelling or tenderness  Extremities: no pedal edema  Neurological/ Psychiatric: AxOx3, Judgement and insight normal;  moving all extremities  Skin: no rashes; no palpable lesions    Labs: all available labs reviewed                        10.6   5.31  )-----------( 278      ( 2024 06:14 )             33.6     07-13    138  |  101  |  10  ----------------------------<  116<H>  4.1   |  32<H>  |  0.54    Ca    9.0      2024 06:14  Phos  3.4     07-13  Mg     1.8     07-13    TPro  6.4  /  Alb  2.7<L>  /  TBili  0.3  /  DBili  x   /  AST  23  /  ALT  18  /  AlkPhos  73  07-13     LIVER FUNCTIONS - ( 2024 06:14 )  Alb: 2.7 g/dL / Pro: 6.4 gm/dL / ALK PHOS: 73 U/L / ALT: 18 U/L / AST: 23 U/L / GGT: x           Urinalysis Basic - ( 2024 06:14 )    Color: x / Appearance: x / SG: x / pH: x  Gluc: 116 mg/dL / Ketone: x  / Bili: x / Urobili: x   Blood: x / Protein: x / Nitrite: x   Leuk Esterase: x / RBC: x / WBC x   Sq Epi: x / Non Sq Epi: x / Bacteria: x          Radiology: all available radiological tests reviewed  < from: CT Angio Chest PE Protocol w/ IV Cont (24 @ 21:40) >    ACC: [id number] EXAM:  CT ANGIO CHEST PULM ART [facility]   ORDERED BY: SUNITHA JARAMILLO     PROCEDURE DATE:  2024          INTERPRETATION:  CLINICAL INFORMATION: Chest pain and shortness of breath    COMPARISON: CT chest 2024    CONTRAST/COMPLICATIONS:  IV Contrast: Omnipaque 350  70 cc administered   0 cc discarded  Oral Contrast: NONE  Complications: None reported at time of study completion    PROCEDURE:  CT Angiography of the Chest.  Sagittal and coronal reformats were performed as well as 3D (MIP)   reconstructions.    FINDINGS:    LUNGS AND LARGE AIRWAYS: Patent central airways. Intralobular emphysema.   Subsegmental compressive atelectasis in the left lower lobe adjacent to   pleural fluid. Dependent right basilar and lingular atelectasis. No   pulmonary nodules.  PLEURA: Small to moderate left pleural effusion, new since prior study.  VESSELS: No pulmonary embolus. Aortic calcification. Status post CABG.  HEART: Cardiomegaly. Small pericardial effusion.  MEDIASTINUM AND SUZANNE: No lymphadenopathy.  CHEST WALL AND LOWER NECK: Within normal limits.  VISUALIZED UPPER ABDOMEN: Within normal limits.  BONES: Degenerative changes.    IMPRESSION:  No pulmonary embolus.    Small to moderate left pleural effusion.        Advanced directives addressed: full resuscitation   HPI:  69F current smoker (2ppd x 50 years), current drinker (2 beers/day), with h/o HLD, macular degeneration, and pulmonary nodules (pulmonology follow up and CT chest @6mo recommended), who recently underwent CABG x4 (LIMA-LAD, SVG-Diagonal, SVG-OM, SVG-RCA) on 24 by Dr. Richards, was found to have wound dehiscence on , pt was send to ED, wound was debrided with placement of wound vac and was dicharged to rehab with 2 week course of augmentin on 24. Patient went to rehab at [facility], over the past 3 days the patient states that she has had an increased cough, has phlegm but feels too weak to expectorate and shortness of breath. She was started on upon arrival to [facility] on 3 L of nasal cannula was found to have a pneumonia by chest x-ray approximately 3 days ago started on levaquin and steroids.  patient was sent to ED for worsening hypoxia and soft BP. In ED vitals stable. CBC noted for WBC 5.75, H/H 9.5/31.5, Plt 257. D-dimer 1046, CMP noted for CO2 35, BUN/Cr 11/0.59, Lactate wnl, Pro-BNP 1352. UA sterile. COVID/Flu negative, CTA neg for PE, noted for left sided pleural effusion. Patient recieved vanc/cefepime, and solumedrol in ED. Admitted for COPD exacerbation.     PMH: as above  PSH: as above  Meds: per reconciliation sheet, noted below  MEDICATIONS  (STANDING):  albuterol/ipratropium for Nebulization 3 milliLiter(s) Nebulizer every 6 hours  aspirin enteric coated 325 milliGRAM(s) Oral daily  atorvastatin 80 milliGRAM(s) Oral at bedtime  budesonide 160 MICROgram(s)/formoterol 4.5 MICROgram(s) Inhaler 2 Puff(s) Inhalation two times a day  cefepime  Injectable. 1000 milliGRAM(s) IV Push every 8 hours  dextrose 5%. 1000 milliLiter(s) (100 mL/Hr) IV Continuous <Continuous>  dextrose 5%. 1000 milliLiter(s) (50 mL/Hr) IV Continuous <Continuous>  dextrose 50% Injectable 25 Gram(s) IV Push once  dextrose 50% Injectable 25 Gram(s) IV Push once  dextrose 50% Injectable 12.5 Gram(s) IV Push once  enoxaparin Injectable 40 milliGRAM(s) SubCutaneous every 24 hours  folic acid 1 milliGRAM(s) Oral daily  furosemide    Tablet 40 milliGRAM(s) Oral daily  glucagon  Injectable 1 milliGRAM(s) IntraMuscular once  insulin lispro (ADMELOG) corrective regimen sliding scale   SubCutaneous three times a day before meals  insulin lispro (ADMELOG) corrective regimen sliding scale   SubCutaneous at bedtime  metoprolol tartrate 25 milliGRAM(s) Oral two times a day  nicotine -   7 mG/24Hr(s) Patch 1 Patch Transdermal daily  nystatin Ointment 1 Application(s) Topical every 12 hours  potassium chloride    Tablet ER 10 milliEquivalent(s) Oral daily  predniSONE   Tablet 40 milliGRAM(s) Oral daily  thiamine 100 milliGRAM(s) Oral daily  tiotropium 2.5 MICROgram(s) Inhaler 2 Puff(s) Inhalation every 24 hours  vancomycin  IVPB 1250 milliGRAM(s) IV Intermittent every 12 hours    Allergies    No Known Allergies    Intolerances      Social: no smoking, no alcohol, no illegal drugs; no recent travel, no exposure to TB  FAMILY HISTORY:  Family history of early CAD (Mother)       no history of premature cardiovascular disease in first degree relatives    ROS: the patient denies fever, no chills, no HA, no dizziness, no sore throat, no blurry vision, no CP, no palpitations, + SOB, + cough, no abdominal pain, no diarrhea, no N/V, no dysuria, no leg pain, no claudication, no rash, no joint aches, no rectal pain or bleeding, no night sweats      All other systems reviewed and are negative    Vital Signs Last 24 Hrs  T(C): 36.5 (2024 08:08), Max: 37 (2024 17:24)  T(F): 97.7 (2024 08:08), Max: 98.6 (2024 17:24)  HR: 81 (2024 08:08) (60 - 81)  BP: 168/67 (2024 08:08) (98/59 - 170/64)  BP(mean): 93 (2024 23:20) (93 - 97)  RR: 19 (2024 08:08) (14 - 22)  SpO2: 100% (2024 08:08) (87% - 100%)    Parameters below as of 2024 08:08  Patient On (Oxygen Delivery Method): mask, Venturi    O2 Concentration (%): 35  Daily Height in cm: 165.1 (2024 17:24)    Daily Weight in k.5 (2024 02:11)    PE:  Constitutional: NAD   HEENT: NC/AT, EOMI, PERRLA, conjunctivae clear; ears and nose atraumatic; pharynx benign  Neck: supple; thyroid not palpable  Back: no tenderness  Respiratory: decreased breath sounds  Cardiovascular: S1S2 regular, no murmurs  Abdomen: soft, not tender, not distended, positive BS; liver and spleen WNL  Genitourinary: no suprapubic tenderness  Lymphatic: no LN palpable  Musculoskeletal: no muscle tenderness, no joint swelling or tenderness  Extremities: no pedal edema  Neurological/ Psychiatric: AxOx3, Judgement and insight normal;  moving all extremities  Skin: no rashes; no palpable lesions; sternal wound    Labs: all available labs reviewed                        10.6   5.31  )-----------( 278      ( 2024 06:14 )             33.6     07-13    138  |  101  |  10  ----------------------------<  116<H>  4.1   |  32<H>  |  0.54    Ca    9.0      2024 06:14  Phos  3.4     07-13  Mg     1.8     07-13    TPro  6.4  /  Alb  2.7<L>  /  TBili  0.3  /  DBili  x   /  AST  23  /  ALT  18  /  AlkPhos  73  07-13     LIVER FUNCTIONS - ( 2024 06:14 )  Alb: 2.7 g/dL / Pro: 6.4 gm/dL / ALK PHOS: 73 U/L / ALT: 18 U/L / AST: 23 U/L / GGT: x           Urinalysis Basic - ( 2024 06:14 )    Color: x / Appearance: x / SG: x / pH: x  Gluc: 116 mg/dL / Ketone: x  / Bili: x / Urobili: x   Blood: x / Protein: x / Nitrite: x   Leuk Esterase: x / RBC: x / WBC x   Sq Epi: x / Non Sq Epi: x / Bacteria: x          Radiology: all available radiological tests reviewed  < from: CT Angio Chest PE Protocol w/ IV Cont (24 @ 21:40) >    ACC: [id number] EXAM:  CT ANGIO CHEST PULM ART [facility]   ORDERED BY: SUNITHA JARAMILLO     PROCEDURE DATE:  2024          INTERPRETATION:  CLINICAL INFORMATION: Chest pain and shortness of breath    COMPARISON: CT chest 2024    CONTRAST/COMPLICATIONS:  IV Contrast: Omnipaque 350  70 cc administered   0 cc discarded  Oral Contrast: NONE  Complications: None reported at time of study completion    PROCEDURE:  CT Angiography of the Chest.  Sagittal and coronal reformats were performed as well as 3D (MIP)   reconstructions.    FINDINGS:    LUNGS AND LARGE AIRWAYS: Patent central airways. Intralobular emphysema.   Subsegmental compressive atelectasis in the left lower lobe adjacent to   pleural fluid. Dependent right basilar and lingular atelectasis. No   pulmonary nodules.  PLEURA: Small to moderate left pleural effusion, new since prior study.  VESSELS: No pulmonary embolus. Aortic calcification. Status post CABG.  HEART: Cardiomegaly. Small pericardial effusion.  MEDIASTINUM AND SUZANNE: No lymphadenopathy.  CHEST WALL AND LOWER NECK: Within normal limits.  VISUALIZED UPPER ABDOMEN: Within normal limits.  BONES: Degenerative changes.    IMPRESSION:  No pulmonary embolus.    Small to moderate left pleural effusion.        Advanced directives addressed: full resuscitation

## 2024-07-13 NOTE — CONSULT NOTE ADULT - ASSESSMENT
A:    69yFemale    Here for:    1. AHRF 2/2  2. PNA  3. ATX  4. Pleural effusion  5. CABG wound    This patient requires a moderate level of care due to the complexity and severity of their condition which increases the amount and complexity of data to be reviewed and analyzed in addition to the risk of complications, morbidity and mortality of patient management    P:    CABG wound on vacuum to portable canister system  No s/s infection  EMR reviewed from [facility] stay and procedure  Discussed with [facility] CTS ACP, whom discussed with Dr Richards (operating surgeon)  HH does not have proper vac supplies and Pt now with current sponge/foam 4 days old  Will remove vac and place wet to dry dressing with BID changes  CTS svc will reval on further mgmt on Monday    RE: pleural effusion  No worsening in condition; has been on 3LNC since sent to [facility] some time ago  No leukocytosis or fever  No indication for urgent drainage, will defer for now    Cont abx per medicine  Trend labs  BG < 180    Dispo: Cont care. Will follow along.    Time spent on this patient encounter: 55+ minutes    Date of entry of this note is equal to the date of services rendered    This includes assessment of the presenting problems with associated risks, reviewing the medical record to prepare for the encounter and meeting face to face with the patient to obtain additional history. I have also performed an appropriate physical exam, made interventions listed and ordered and interpreted appropriate diagnostic studies as documented. This also included communication and coordination of care with the multidisciplinary team including the bedside nurse, appropriate attending of record and consultants as needed.

## 2024-07-13 NOTE — ED ADULT NURSE REASSESSMENT NOTE - NS ED NURSE REASSESS COMMENT FT1
Rn/educator and supervisor gathered supplies for wound vac change. Wound vac brought to bedside by RN and educator-tubing for hospital wound vac does not fit current wound vac dsg in place. Will maintain pt own wound vac to 125continuous suction as previously ordered. Personal wound vac with  in place and functioning. Wound care consult placed. Floor made aware of inability to change wound vac at this time. Pt put in for transport.

## 2024-07-13 NOTE — H&P ADULT - PROBLEM SELECTOR PLAN 6
DVT ppx: lovenox.   Fall and aspiration precautions.  Turn and reposition q2h to prevent bedsores  Skin checks as per RN

## 2024-07-13 NOTE — ED ADULT NURSE REASSESSMENT NOTE - NS ED NURSE REASSESS COMMENT FT1
Pt received sleepy but arousable. Pt resting comfortably in bed-denies pain. Pt on 4.5 liters nasal cannula satting 96%. Pt feels her breathing is "better". Purewick in place draining CYU. Wound vac to midsternal chest wall to 125 continuous suction. All needs addressed-bridge orders in place. Bed assignment pending.

## 2024-07-13 NOTE — PROGRESS NOTE ADULT - SUBJECTIVE AND OBJECTIVE BOX
Patient is seen and examined. No acute events overnight. Charts reviewed.    Gen: No fever, chills, weakness  ENT: No visual changes or throat pain  Neck: No pain or stiffness  Respiratory: No cough or wheezing  Cardiovascular: No chest pain or palpitations  Gastrointestinal: No abdominal pain, nausea, vomiting, constipation, or diarrhea  Hematologic: No easy bleeding or bruising  Neurologic: No numbness or focal weakness  Psych: No depression or insomnia  Skin: No rash or itching        MEDICATIONS  (STANDING):  albuterol/ipratropium for Nebulization 3 milliLiter(s) Nebulizer every 6 hours  aspirin enteric coated 325 milliGRAM(s) Oral daily  atorvastatin 80 milliGRAM(s) Oral at bedtime  budesonide 160 MICROgram(s)/formoterol 4.5 MICROgram(s) Inhaler 2 Puff(s) Inhalation two times a day  cefepime  Injectable. 1000 milliGRAM(s) IV Push every 8 hours  dextrose 5%. 1000 milliLiter(s) (100 mL/Hr) IV Continuous <Continuous>  dextrose 5%. 1000 milliLiter(s) (50 mL/Hr) IV Continuous <Continuous>  dextrose 50% Injectable 12.5 Gram(s) IV Push once  dextrose 50% Injectable 25 Gram(s) IV Push once  dextrose 50% Injectable 25 Gram(s) IV Push once  enoxaparin Injectable 40 milliGRAM(s) SubCutaneous every 24 hours  folic acid 1 milliGRAM(s) Oral daily  furosemide    Tablet 40 milliGRAM(s) Oral daily  glucagon  Injectable 1 milliGRAM(s) IntraMuscular once  insulin lispro (ADMELOG) corrective regimen sliding scale   SubCutaneous three times a day before meals  insulin lispro (ADMELOG) corrective regimen sliding scale   SubCutaneous at bedtime  metoprolol tartrate 25 milliGRAM(s) Oral two times a day  nicotine -   7 mG/24Hr(s) Patch 1 Patch Transdermal daily  nystatin Ointment 1 Application(s) Topical every 12 hours  potassium chloride    Tablet ER 10 milliEquivalent(s) Oral daily  predniSONE   Tablet 40 milliGRAM(s) Oral daily  thiamine 100 milliGRAM(s) Oral daily  tiotropium 2.5 MICROgram(s) Inhaler 2 Puff(s) Inhalation every 24 hours    MEDICATIONS  (PRN):  acetaminophen     Tablet .. 650 milliGRAM(s) Oral every 6 hours PRN Temp greater or equal to 38C (100.4F), Mild Pain (1 - 3)  aluminum hydroxide/magnesium hydroxide/simethicone Suspension 30 milliLiter(s) Oral every 4 hours PRN Dyspepsia  dextrose Oral Gel 15 Gram(s) Oral once PRN Blood Glucose LESS THAN 70 milliGRAM(s)/deciliter  melatonin 3 milliGRAM(s) Oral at bedtime PRN Insomnia  ondansetron Injectable 4 milliGRAM(s) IV Push every 8 hours PRN Nausea and/or Vomiting  oxyCODONE    IR 5 milliGRAM(s) Oral every 6 hours PRN Severe Pain (7 - 10)  senna 2 Tablet(s) Oral at bedtime PRN for constipation      Vital Signs Last 24 Hrs  T(C): 36.5 (13 Jul 2024 08:08), Max: 37 (12 Jul 2024 17:24)  T(F): 97.7 (13 Jul 2024 08:08), Max: 98.6 (12 Jul 2024 17:24)  HR: 81 (13 Jul 2024 08:08) (60 - 81)  BP: 168/67 (13 Jul 2024 08:08) (98/59 - 170/64)  BP(mean): 93 (12 Jul 2024 23:20) (93 - 97)  RR: 19 (13 Jul 2024 08:08) (14 - 22)  SpO2: 100% (13 Jul 2024 08:08) (87% - 100%)    Parameters below as of 13 Jul 2024 08:08  Patient On (Oxygen Delivery Method): mask, Venturi    O2 Concentration (%): 35    PHYSICAL EXAM:  GENERAL: NAD, lying in bed comfortably  HEAD:  Atraumatic, Normocephalic  EYES: conjunctiva and sclera clear  ENT: Moist mucous membranes  NECK: Supple, No JVD  CHEST/LUNG: Decreased breath sound in left Lung; No rales, rhonchi, wheezing. Unlabored respirations, on NC oxygen  -Wound vac in place in lower midsternal wound   HEART: Regular rate and rhythm; No murmurs  ABDOMEN: Bowel sounds present; Soft, Nontender, Nondistended.   EXTREMITIES:  2+ Peripheral Pulses, brisk capillary refill. No clubbing, cyanosis, or edema  NERVOUS SYSTEM:  Alert & Oriented X3, speech clear. No deficits   MSK: FROM all 4 extremities, full and equal strength          LABS:                          10.6   5.31  )-----------( 278      ( 13 Jul 2024 06:14 )             33.6     13 Jul 2024 06:14    138    |  101    |  10     ----------------------------<  116    4.1     |  32     |  0.54     Ca    9.0        13 Jul 2024 06:14  Phos  3.4       13 Jul 2024 06:14  Mg     1.8       13 Jul 2024 06:14    TPro  6.4    /  Alb  2.7    /  TBili  0.3    /  DBili  x      /  AST  23     /  ALT  18     /  AlkPhos  73     13 Jul 2024 06:14    LIVER FUNCTIONS - ( 13 Jul 2024 06:14 )  Alb: 2.7 g/dL / Pro: 6.4 gm/dL / ALK PHOS: 73 U/L / ALT: 18 U/L / AST: 23 U/L / GGT: x           PT/INR - ( 12 Jul 2024 17:59 )   PT: 12.2 sec;   INR: 1.08 ratio         PTT - ( 12 Jul 2024 17:59 )  PTT:30.3 sec  CAPILLARY BLOOD GLUCOSE      POCT Blood Glucose.: 108 mg/dL (13 Jul 2024 08:01)        Urinalysis Basic - ( 13 Jul 2024 06:14 )    Color: x / Appearance: x / SG: x / pH: x  Gluc: 116 mg/dL / Ketone: x  / Bili: x / Urobili: x   Blood: x / Protein: x / Nitrite: x   Leuk Esterase: x / RBC: x / WBC x   Sq Epi: x / Non Sq Epi: x / Bacteria: x        RADIOLOGY:

## 2024-07-13 NOTE — H&P ADULT - PROBLEM SELECTOR PLAN 3
Patient with wound dehiscence, with wound vac  Currently site appears clean, no erythema, drainage, or tenderness.   Patient with her own wound vac, continue for now until seen by wound care/cardiothoracic.

## 2024-07-13 NOTE — CONSULT NOTE ADULT - SUBJECTIVE AND OBJECTIVE BOX
CTS Progress Note    HPI:    S:    69F current smoker (2ppd x 50 years), current drinker (2 beers/day), with h/o HLD, macular degeneration, and pulmonary nodules (pulmonology follow up and CT chest @6mo recommended), who recently underwent CABG x4 (LIMA-LAD, SVG-Diagonal, SVG-OM, SVG-RCA) on 6/14/24 by Dr. Richards, was found to have wound dehiscence on 6/26, pt was send to ED, wound was debrided with placement of wound vac and was dicharged to rehab with 2 week course of augmentin on 6/28/24. Patient went to rehab at [facility], over the past 3 days the patient states that she has had an increased cough, has phlegm but feels too weak to expectorate and shortness of breath. She was started on upon arrival to [facility] on 3 L of nasal cannula was found to have a pneumonia by chest x-ray approximately 3 days ago started on levaquin and steroids. Yesterday patient was sent to ED for worsening hypoxia and soft BP. Patient denies any recent fevers, chills, sore throat, chest pain, palpitations. No other acute complaints and ROS otherwise benign.    In ED vitals stable. CBC noted for WBC 5.75, H/H 9.5/31.5, Plt 257. D-dimer 1046, CMP noted for CO2 35, BUN/Cr 11/0.59, Lactate wnl, Pro-BNP 1352. UA sterile. COVID/Flu negative, CTA neg for PE, noted for left sided pleural effusion. Patient recieved vanc/cefepime, and solumedrol in ED. Admitted for COPD exacerbation.     7/13: Being Tx for PNA, AECOPD. Consulted for CABG wound, currently on portable vacuum with green foam.    ROS: + cough, SOB (some improvement)  remainder of systems reviewed, negative      Allergies    No Known Allergies    Intolerances        MEDICATIONS  (STANDING):  albuterol/ipratropium for Nebulization 3 milliLiter(s) Nebulizer every 6 hours  aspirin enteric coated 325 milliGRAM(s) Oral daily  atorvastatin 80 milliGRAM(s) Oral at bedtime  budesonide 160 MICROgram(s)/formoterol 4.5 MICROgram(s) Inhaler 2 Puff(s) Inhalation two times a day  cefepime  Injectable. 1000 milliGRAM(s) IV Push every 8 hours  dextrose 5%. 1000 milliLiter(s) (100 mL/Hr) IV Continuous <Continuous>  dextrose 5%. 1000 milliLiter(s) (50 mL/Hr) IV Continuous <Continuous>  dextrose 50% Injectable 12.5 Gram(s) IV Push once  dextrose 50% Injectable 25 Gram(s) IV Push once  dextrose 50% Injectable 25 Gram(s) IV Push once  enoxaparin Injectable 40 milliGRAM(s) SubCutaneous every 24 hours  folic acid 1 milliGRAM(s) Oral daily  furosemide    Tablet 40 milliGRAM(s) Oral daily  glucagon  Injectable 1 milliGRAM(s) IntraMuscular once  insulin lispro (ADMELOG) corrective regimen sliding scale   SubCutaneous three times a day before meals  insulin lispro (ADMELOG) corrective regimen sliding scale   SubCutaneous at bedtime  metoprolol tartrate 25 milliGRAM(s) Oral two times a day  nicotine -   7 mG/24Hr(s) Patch 1 Patch Transdermal daily  nystatin Ointment 1 Application(s) Topical every 12 hours  potassium chloride    Tablet ER 10 milliEquivalent(s) Oral daily  predniSONE   Tablet 40 milliGRAM(s) Oral daily  thiamine 100 milliGRAM(s) Oral daily  tiotropium 2.5 MICROgram(s) Inhaler 2 Puff(s) Inhalation every 24 hours    MEDICATIONS  (PRN):  acetaminophen     Tablet .. 650 milliGRAM(s) Oral every 6 hours PRN Temp greater or equal to 38C (100.4F), Mild Pain (1 - 3)  aluminum hydroxide/magnesium hydroxide/simethicone Suspension 30 milliLiter(s) Oral every 4 hours PRN Dyspepsia  dextrose Oral Gel 15 Gram(s) Oral once PRN Blood Glucose LESS THAN 70 milliGRAM(s)/deciliter  melatonin 3 milliGRAM(s) Oral at bedtime PRN Insomnia  ondansetron Injectable 4 milliGRAM(s) IV Push every 8 hours PRN Nausea and/or Vomiting  oxyCODONE    IR 5 milliGRAM(s) Oral every 6 hours PRN Severe Pain (7 - 10)  senna 2 Tablet(s) Oral at bedtime PRN for constipation      Drug Dosing Weight  Height (cm): 165.1 (12 Jul 2024 17:24)  Weight (kg): 81.9 (12 Jul 2024 19:48)  BMI (kg/m2): 30 (12 Jul 2024 19:48)  BSA (m2): 1.89 (12 Jul 2024 19:48)    PAST MEDICAL & SURGICAL HISTORY:  Macular degeneration      HLD (hyperlipidemia)      Current smoker      Jaw fracture      Status post coronary artery bypass grafting          FAMILY HISTORY:  Family history of early CAD (Mother)            O:    ICU Vital Signs Last 24 Hrs  T(C): 36.5 (13 Jul 2024 08:08), Max: 37 (12 Jul 2024 17:24)  T(F): 97.7 (13 Jul 2024 08:08), Max: 98.6 (12 Jul 2024 17:24)  HR: 81 (13 Jul 2024 08:08) (60 - 81)  BP: 168/67 (13 Jul 2024 08:08) (98/59 - 170/64)  BP(mean): 93 (12 Jul 2024 23:20) (93 - 97)  ABP: --  ABP(mean): --  RR: 19 (13 Jul 2024 08:08) (14 - 22)  SpO2: 100% (13 Jul 2024 08:08) (87% - 100%)    O2 Parameters below as of 13 Jul 2024 08:08  Patient On (Oxygen Delivery Method): mask, Venturi    O2 Concentration (%): 35            I&O's Detail          PE:    Adult lying in bed  + appears older then age with weakness  + NC in place  No JVD trachea midline  Normocephalic, atraumatic  S1S2+  Poor BS B/L basillar  Abd soft NTND  No leg swelling/edema noted  Awake and alert  + midline CABG wound with vacuum in place; when removed carefully, wound is granulating, is C/D/I, no signs of infection    LABS:    CBC Full  -  ( 13 Jul 2024 06:14 )  WBC Count : 5.31 K/uL  RBC Count : 3.59 M/uL  Hemoglobin : 10.6 g/dL  Hematocrit : 33.6 %  Platelet Count - Automated : 278 K/uL  Mean Cell Volume : 93.6 fl  Mean Cell Hemoglobin : 29.5 pg  Mean Cell Hemoglobin Concentration : 31.5 gm/dL  Auto Neutrophil # : 3.74 K/uL  Auto Lymphocyte # : 1.24 K/uL  Auto Monocyte # : 0.25 K/uL  Auto Eosinophil # : 0.01 K/uL  Auto Basophil # : 0.01 K/uL  Auto Neutrophil % : 70.4 %  Auto Lymphocyte % : 23.4 %  Auto Monocyte % : 4.7 %  Auto Eosinophil % : 0.2 %  Auto Basophil % : 0.2 %    07-13    138  |  101  |  10  ----------------------------<  116<H>  4.1   |  32<H>  |  0.54    Ca    9.0      13 Jul 2024 06:14  Phos  3.4     07-13  Mg     1.8     07-13    TPro  6.4  /  Alb  2.7<L>  /  TBili  0.3  /  DBili  x   /  AST  23  /  ALT  18  /  AlkPhos  73  07-13    PT/INR - ( 12 Jul 2024 17:59 )   PT: 12.2 sec;   INR: 1.08 ratio         PTT - ( 12 Jul 2024 17:59 )  PTT:30.3 sec  Urinalysis Basic - ( 13 Jul 2024 06:14 )    Color: x / Appearance: x / SG: x / pH: x  Gluc: 116 mg/dL / Ketone: x  / Bili: x / Urobili: x   Blood: x / Protein: x / Nitrite: x   Leuk Esterase: x / RBC: x / WBC x   Sq Epi: x / Non Sq Epi: x / Bacteria: x      CAPILLARY BLOOD GLUCOSE      POCT Blood Glucose.: 164 mg/dL (13 Jul 2024 11:55)  POCT Blood Glucose.: 108 mg/dL (13 Jul 2024 08:01)        LIVER FUNCTIONS - ( 13 Jul 2024 06:14 )  Alb: 2.7 g/dL / Pro: 6.4 gm/dL / ALK PHOS: 73 U/L / ALT: 18 U/L / AST: 23 U/L / GGT: x

## 2024-07-14 LAB
ANION GAP SERPL CALC-SCNC: 3 MMOL/L — LOW (ref 5–17)
BUN SERPL-MCNC: 14 MG/DL — SIGNIFICANT CHANGE UP (ref 7–23)
CALCIUM SERPL-MCNC: 9.5 MG/DL — SIGNIFICANT CHANGE UP (ref 8.5–10.1)
CHLORIDE SERPL-SCNC: 100 MMOL/L — SIGNIFICANT CHANGE UP (ref 96–108)
CO2 SERPL-SCNC: 35 MMOL/L — HIGH (ref 22–31)
CREAT SERPL-MCNC: 0.61 MG/DL — SIGNIFICANT CHANGE UP (ref 0.5–1.3)
CULTURE RESULTS: SIGNIFICANT CHANGE UP
EGFR: 97 ML/MIN/1.73M2 — SIGNIFICANT CHANGE UP
GLUCOSE BLDC GLUCOMTR-MCNC: 132 MG/DL — HIGH (ref 70–99)
GLUCOSE BLDC GLUCOMTR-MCNC: 167 MG/DL — HIGH (ref 70–99)
GLUCOSE BLDC GLUCOMTR-MCNC: 191 MG/DL — HIGH (ref 70–99)
GLUCOSE BLDC GLUCOMTR-MCNC: 99 MG/DL — SIGNIFICANT CHANGE UP (ref 70–99)
GLUCOSE SERPL-MCNC: 106 MG/DL — HIGH (ref 70–99)
HCT VFR BLD CALC: 33.9 % — LOW (ref 34.5–45)
HGB BLD-MCNC: 10.5 G/DL — LOW (ref 11.5–15.5)
MCHC RBC-ENTMCNC: 28.6 PG — SIGNIFICANT CHANGE UP (ref 27–34)
MCHC RBC-ENTMCNC: 31 GM/DL — LOW (ref 32–36)
MCV RBC AUTO: 92.4 FL — SIGNIFICANT CHANGE UP (ref 80–100)
PLATELET # BLD AUTO: 291 K/UL — SIGNIFICANT CHANGE UP (ref 150–400)
POTASSIUM SERPL-MCNC: 3.4 MMOL/L — LOW (ref 3.5–5.3)
POTASSIUM SERPL-SCNC: 3.4 MMOL/L — LOW (ref 3.5–5.3)
RBC # BLD: 3.67 M/UL — LOW (ref 3.8–5.2)
RBC # FLD: 13.8 % — SIGNIFICANT CHANGE UP (ref 10.3–14.5)
SODIUM SERPL-SCNC: 138 MMOL/L — SIGNIFICANT CHANGE UP (ref 135–145)
SPECIMEN SOURCE: SIGNIFICANT CHANGE UP
WBC # BLD: 9.76 K/UL — SIGNIFICANT CHANGE UP (ref 3.8–10.5)
WBC # FLD AUTO: 9.76 K/UL — SIGNIFICANT CHANGE UP (ref 3.8–10.5)

## 2024-07-14 PROCEDURE — 99232 SBSQ HOSP IP/OBS MODERATE 35: CPT

## 2024-07-14 RX ORDER — POTASSIUM CHLORIDE 600 MG/1
40 TABLET, FILM COATED, EXTENDED RELEASE ORAL ONCE
Refills: 0 | Status: COMPLETED | OUTPATIENT
Start: 2024-07-14 | End: 2024-07-14

## 2024-07-14 RX ADMIN — CEFEPIME 1000 MILLIGRAM(S): 1 INJECTION, POWDER, FOR SOLUTION INTRAMUSCULAR; INTRAVENOUS at 21:31

## 2024-07-14 RX ADMIN — ENOXAPARIN SODIUM 40 MILLIGRAM(S): 100 INJECTION SUBCUTANEOUS at 05:57

## 2024-07-14 RX ADMIN — IPRATROPIUM BROMIDE AND ALBUTEROL SULFATE 3 MILLILITER(S): .5; 3 SOLUTION RESPIRATORY (INHALATION) at 09:04

## 2024-07-14 RX ADMIN — Medication 2 PUFF(S): at 19:58

## 2024-07-14 RX ADMIN — Medication 25 MILLIGRAM(S): at 21:30

## 2024-07-14 RX ADMIN — Medication 1 APPLICATION(S): at 08:25

## 2024-07-14 RX ADMIN — CEFEPIME 1000 MILLIGRAM(S): 1 INJECTION, POWDER, FOR SOLUTION INTRAMUSCULAR; INTRAVENOUS at 05:57

## 2024-07-14 RX ADMIN — TIOTROPIUM BROMIDE 2 PUFF(S): 18 CAPSULE ORAL; RESPIRATORY (INHALATION) at 09:04

## 2024-07-14 RX ADMIN — Medication 1 APPLICATION(S): at 21:29

## 2024-07-14 RX ADMIN — INSULIN LISPRO 2: 100 INJECTION, SOLUTION SUBCUTANEOUS at 17:40

## 2024-07-14 RX ADMIN — IPRATROPIUM BROMIDE AND ALBUTEROL SULFATE 3 MILLILITER(S): .5; 3 SOLUTION RESPIRATORY (INHALATION) at 01:42

## 2024-07-14 RX ADMIN — FUROSEMIDE 40 MILLIGRAM(S): 10 INJECTION, SOLUTION INTRAMUSCULAR; INTRAVENOUS at 09:33

## 2024-07-14 RX ADMIN — Medication 25 MILLIGRAM(S): at 09:33

## 2024-07-14 RX ADMIN — Medication 2 PUFF(S): at 09:04

## 2024-07-14 RX ADMIN — NICOTINE POLACRILEX 1 PATCH: 2 LOZENGE ORAL at 13:29

## 2024-07-14 RX ADMIN — ASPIRIN 325 MILLIGRAM(S): 325 TABLET, FILM COATED ORAL at 09:34

## 2024-07-14 RX ADMIN — Medication 1 MILLIGRAM(S): at 09:33

## 2024-07-14 RX ADMIN — CEFEPIME 1000 MILLIGRAM(S): 1 INJECTION, POWDER, FOR SOLUTION INTRAMUSCULAR; INTRAVENOUS at 13:29

## 2024-07-14 RX ADMIN — POTASSIUM CHLORIDE 10 MILLIEQUIVALENT(S): 600 TABLET, FILM COATED, EXTENDED RELEASE ORAL at 09:33

## 2024-07-14 RX ADMIN — IPRATROPIUM BROMIDE AND ALBUTEROL SULFATE 3 MILLILITER(S): .5; 3 SOLUTION RESPIRATORY (INHALATION) at 13:24

## 2024-07-14 RX ADMIN — PREDNISONE 40 MILLIGRAM(S): 10 TABLET ORAL at 09:34

## 2024-07-14 RX ADMIN — Medication 100 MILLIGRAM(S): at 09:33

## 2024-07-14 RX ADMIN — POTASSIUM CHLORIDE 40 MILLIEQUIVALENT(S): 600 TABLET, FILM COATED, EXTENDED RELEASE ORAL at 13:29

## 2024-07-14 RX ADMIN — ATORVASTATIN CALCIUM 80 MILLIGRAM(S): 20 TABLET, FILM COATED ORAL at 21:30

## 2024-07-14 RX ADMIN — IPRATROPIUM BROMIDE AND ALBUTEROL SULFATE 3 MILLILITER(S): .5; 3 SOLUTION RESPIRATORY (INHALATION) at 19:58

## 2024-07-14 NOTE — PROGRESS NOTE ADULT - SUBJECTIVE AND OBJECTIVE BOX
Patient is seen and examined today. No acute events overnight. No fever. Charts reviewed.    Gen: No fever, chills, weakness  ENT: No visual changes or throat pain  Neck: No pain or stiffness  Respiratory: No cough or wheezing  Cardiovascular: No chest pain or palpitations  Gastrointestinal: No abdominal pain, nausea, vomiting, constipation, or diarrhea  Hematologic: No easy bleeding or bruising  Neurologic: No numbness or focal weakness  Psych: No depression or insomnia  Skin: No rash or itching        MEDICATIONS  (STANDING):  albuterol/ipratropium for Nebulization 3 milliLiter(s) Nebulizer every 6 hours  aspirin enteric coated 325 milliGRAM(s) Oral daily  atorvastatin 80 milliGRAM(s) Oral at bedtime  budesonide 160 MICROgram(s)/formoterol 4.5 MICROgram(s) Inhaler 2 Puff(s) Inhalation two times a day  cefepime  Injectable. 1000 milliGRAM(s) IV Push every 8 hours  dextrose 5%. 1000 milliLiter(s) (100 mL/Hr) IV Continuous <Continuous>  dextrose 5%. 1000 milliLiter(s) (50 mL/Hr) IV Continuous <Continuous>  dextrose 50% Injectable 12.5 Gram(s) IV Push once  dextrose 50% Injectable 25 Gram(s) IV Push once  dextrose 50% Injectable 25 Gram(s) IV Push once  enoxaparin Injectable 40 milliGRAM(s) SubCutaneous every 24 hours  folic acid 1 milliGRAM(s) Oral daily  furosemide    Tablet 40 milliGRAM(s) Oral daily  glucagon  Injectable 1 milliGRAM(s) IntraMuscular once  insulin lispro (ADMELOG) corrective regimen sliding scale   SubCutaneous three times a day before meals  insulin lispro (ADMELOG) corrective regimen sliding scale   SubCutaneous at bedtime  metoprolol tartrate 25 milliGRAM(s) Oral two times a day  nicotine -   7 mG/24Hr(s) Patch 1 Patch Transdermal daily  nystatin Ointment 1 Application(s) Topical every 12 hours  potassium chloride    Tablet ER 10 milliEquivalent(s) Oral daily  predniSONE   Tablet 40 milliGRAM(s) Oral daily  thiamine 100 milliGRAM(s) Oral daily  tiotropium 2.5 MICROgram(s) Inhaler 2 Puff(s) Inhalation every 24 hours    MEDICATIONS  (PRN):  acetaminophen     Tablet .. 650 milliGRAM(s) Oral every 6 hours PRN Temp greater or equal to 38C (100.4F), Mild Pain (1 - 3)  aluminum hydroxide/magnesium hydroxide/simethicone Suspension 30 milliLiter(s) Oral every 4 hours PRN Dyspepsia  dextrose Oral Gel 15 Gram(s) Oral once PRN Blood Glucose LESS THAN 70 milliGRAM(s)/deciliter  melatonin 3 milliGRAM(s) Oral at bedtime PRN Insomnia  ondansetron Injectable 4 milliGRAM(s) IV Push every 8 hours PRN Nausea and/or Vomiting  oxyCODONE    IR 5 milliGRAM(s) Oral every 6 hours PRN Severe Pain (7 - 10)  senna 2 Tablet(s) Oral at bedtime PRN for constipation      Vital Signs Last 24 Hrs  T(C): 36.8 (14 Jul 2024 09:02), Max: 36.9 (13 Jul 2024 22:24)  T(F): 98.2 (14 Jul 2024 09:02), Max: 98.4 (13 Jul 2024 22:24)  HR: 64 (14 Jul 2024 09:04) (62 - 66)  BP: 156/58 (14 Jul 2024 09:02) (110/48 - 156/58)  BP(mean): --  RR: 18 (14 Jul 2024 09:02) (18 - 18)  SpO2: 97% (14 Jul 2024 09:04) (93% - 97%)    Parameters below as of 14 Jul 2024 09:04  Patient On (Oxygen Delivery Method): nasal cannula, 4L      PHYSICAL EXAM:  GENERAL: NAD, lying in bed comfortably  HEAD:  Atraumatic, Normocephalic  EYES: conjunctiva and sclera clear  ENT: Moist mucous membranes  NECK: Supple, No JVD  CHEST/LUNG: Decreased breath sound in left Lung; No rales, rhonchi, wheezing. Unlabored respirations, on NC oxygen  -dressing on chest wound  HEART: Regular rate and rhythm; No murmurs  ABDOMEN: Bowel sounds present; Soft, Nontender, Nondistended.   EXTREMITIES:  2+ Peripheral Pulses, brisk capillary refill. No clubbing, cyanosis, or edema  NERVOUS SYSTEM:  Alert & Oriented X3, speech clear. No deficits   MSK: FROM all 4 extremities, full and equal strength      LABS:                          10.5   9.76  )-----------( 291      ( 14 Jul 2024 06:47 )             33.9     14 Jul 2024 06:47    138    |  100    |  14     ----------------------------<  106    3.4     |  35     |  0.61     Ca    9.5        14 Jul 2024 06:47  Phos  3.4       13 Jul 2024 06:14  Mg     1.8       13 Jul 2024 06:14    TPro  6.4    /  Alb  2.7    /  TBili  0.3    /  DBili  x      /  AST  23     /  ALT  18     /  AlkPhos  73     13 Jul 2024 06:14    LIVER FUNCTIONS - ( 13 Jul 2024 06:14 )  Alb: 2.7 g/dL / Pro: 6.4 gm/dL / ALK PHOS: 73 U/L / ALT: 18 U/L / AST: 23 U/L / GGT: x           PT/INR - ( 12 Jul 2024 17:59 )   PT: 12.2 sec;   INR: 1.08 ratio         PTT - ( 12 Jul 2024 17:59 )  PTT:30.3 sec  CAPILLARY BLOOD GLUCOSE      POCT Blood Glucose.: 99 mg/dL (14 Jul 2024 08:15)  POCT Blood Glucose.: 185 mg/dL (13 Jul 2024 21:47)  POCT Blood Glucose.: 130 mg/dL (13 Jul 2024 16:10)        Urinalysis Basic - ( 14 Jul 2024 06:47 )    Color: x / Appearance: x / SG: x / pH: x  Gluc: 106 mg/dL / Ketone: x  / Bili: x / Urobili: x   Blood: x / Protein: x / Nitrite: x   Leuk Esterase: x / RBC: x / WBC x   Sq Epi: x / Non Sq Epi: x / Bacteria: x        RADIOLOGY:

## 2024-07-14 NOTE — PROGRESS NOTE ADULT - SUBJECTIVE AND OBJECTIVE BOX
Subjective:    pat better, sitting in bed, no new complaints.    Home Medications:  acetaminophen 325 mg oral tablet: 2 tab(s) orally every 6 hours As needed Mild Pain (1 - 3), Moderate Pain (4 - 6) (12 Jul 2024 23:54)  aspirin 325 mg oral delayed release tablet: 1 tab(s) orally once a day (12 Jul 2024 23:54)  atorvastatin 80 mg oral tablet: 1 tab(s) orally once a day (at bedtime) (12 Jul 2024 23:54)  enoxaparin: 40 milligram(s) subcutaneous once a day dvt prophylaxis (12 Jul 2024 23:54)  fluticasone-salmeterol 250 mcg-50 mcg inhalation powder: inhaled as needed for (12 Jul 2024 23:54)  Lasix 40 mg oral tablet: 1 tab(s) orally once a day (13 Jul 2024 02:38)  metoprolol tartrate 25 mg oral tablet: 1 tab(s) orally 2 times a day (12 Jul 2024 23:54)  nystatin 100,000 units/g topical ointment: Apply topically to affected area 2 times a day (13 Jul 2024 02:38)  oxyCODONE 5 mg oral capsule: 1 cap(s) orally every 6 hours as needed for  severe pain (13 Jul 2024 02:38)  potassium chloride: 10 milliequivalent(s) orally once a day (13 Jul 2024 02:38)    MEDICATIONS  (STANDING):  albuterol/ipratropium for Nebulization 3 milliLiter(s) Nebulizer every 6 hours  aspirin enteric coated 325 milliGRAM(s) Oral daily  atorvastatin 80 milliGRAM(s) Oral at bedtime  budesonide 160 MICROgram(s)/formoterol 4.5 MICROgram(s) Inhaler 2 Puff(s) Inhalation two times a day  cefepime  Injectable. 1000 milliGRAM(s) IV Push every 8 hours  dextrose 5%. 1000 milliLiter(s) (100 mL/Hr) IV Continuous <Continuous>  dextrose 5%. 1000 milliLiter(s) (50 mL/Hr) IV Continuous <Continuous>  dextrose 50% Injectable 12.5 Gram(s) IV Push once  dextrose 50% Injectable 25 Gram(s) IV Push once  dextrose 50% Injectable 25 Gram(s) IV Push once  enoxaparin Injectable 40 milliGRAM(s) SubCutaneous every 24 hours  folic acid 1 milliGRAM(s) Oral daily  furosemide    Tablet 40 milliGRAM(s) Oral daily  glucagon  Injectable 1 milliGRAM(s) IntraMuscular once  insulin lispro (ADMELOG) corrective regimen sliding scale   SubCutaneous three times a day before meals  insulin lispro (ADMELOG) corrective regimen sliding scale   SubCutaneous at bedtime  metoprolol tartrate 25 milliGRAM(s) Oral two times a day  nicotine -   7 mG/24Hr(s) Patch 1 Patch Transdermal daily  nystatin Ointment 1 Application(s) Topical every 12 hours  potassium chloride    Tablet ER 40 milliEquivalent(s) Oral once  potassium chloride    Tablet ER 10 milliEquivalent(s) Oral daily  predniSONE   Tablet 40 milliGRAM(s) Oral daily  thiamine 100 milliGRAM(s) Oral daily  tiotropium 2.5 MICROgram(s) Inhaler 2 Puff(s) Inhalation every 24 hours    MEDICATIONS  (PRN):  acetaminophen     Tablet .. 650 milliGRAM(s) Oral every 6 hours PRN Temp greater or equal to 38C (100.4F), Mild Pain (1 - 3)  aluminum hydroxide/magnesium hydroxide/simethicone Suspension 30 milliLiter(s) Oral every 4 hours PRN Dyspepsia  dextrose Oral Gel 15 Gram(s) Oral once PRN Blood Glucose LESS THAN 70 milliGRAM(s)/deciliter  melatonin 3 milliGRAM(s) Oral at bedtime PRN Insomnia  ondansetron Injectable 4 milliGRAM(s) IV Push every 8 hours PRN Nausea and/or Vomiting  oxyCODONE    IR 5 milliGRAM(s) Oral every 6 hours PRN Severe Pain (7 - 10)  senna 2 Tablet(s) Oral at bedtime PRN for constipation      Allergies    No Known Allergies    Intolerances        Vital Signs Last 24 Hrs  T(C): 36.8 (14 Jul 2024 09:02), Max: 36.9 (13 Jul 2024 22:24)  T(F): 98.2 (14 Jul 2024 09:02), Max: 98.4 (13 Jul 2024 22:24)  HR: 64 (14 Jul 2024 09:04) (62 - 66)  BP: 156/58 (14 Jul 2024 09:02) (110/48 - 156/58)  BP(mean): --  RR: 18 (14 Jul 2024 09:02) (18 - 18)  SpO2: 97% (14 Jul 2024 09:04) (93% - 97%)    Parameters below as of 14 Jul 2024 09:04  Patient On (Oxygen Delivery Method): nasal cannula, 4L          PHYSICAL EXAMINATION:    NECK:  Supple. No lymphadenopathy. Jugular venous pressure not elevated. Carotids equal.   HEART:   The cardiac impulse has a normal quality. Reg., Nl S1 and S2.  There are no murmurs, rubs or gallops noted  CHEST:  Chest crackles to auscultation. Normal respiratory effort.  ABDOMEN:  Soft and nontender.   EXTREMITIES:  There is no edema.       LABS:                        10.5   9.76  )-----------( 291      ( 14 Jul 2024 06:47 )             33.9     07-14    138  |  100  |  14  ----------------------------<  106<H>  3.4<L>   |  35<H>  |  0.61    Ca    9.5      14 Jul 2024 06:47  Phos  3.4     07-13  Mg     1.8     07-13    TPro  6.4  /  Alb  2.7<L>  /  TBili  0.3  /  DBili  x   /  AST  23  /  ALT  18  /  AlkPhos  73  07-13    PT/INR - ( 12 Jul 2024 17:59 )   PT: 12.2 sec;   INR: 1.08 ratio         PTT - ( 12 Jul 2024 17:59 )  PTT:30.3 sec  Urinalysis Basic - ( 14 Jul 2024 06:47 )    Color: x / Appearance: x / SG: x / pH: x  Gluc: 106 mg/dL / Ketone: x  / Bili: x / Urobili: x   Blood: x / Protein: x / Nitrite: x   Leuk Esterase: x / RBC: x / WBC x   Sq Epi: x / Non Sq Epi: x / Bacteria: x

## 2024-07-15 LAB
ANION GAP SERPL CALC-SCNC: 2 MMOL/L — LOW (ref 5–17)
BUN SERPL-MCNC: 18 MG/DL — SIGNIFICANT CHANGE UP (ref 7–23)
CALCIUM SERPL-MCNC: 9.6 MG/DL — SIGNIFICANT CHANGE UP (ref 8.5–10.1)
CHLORIDE SERPL-SCNC: 98 MMOL/L — SIGNIFICANT CHANGE UP (ref 96–108)
CO2 SERPL-SCNC: 37 MMOL/L — HIGH (ref 22–31)
CREAT SERPL-MCNC: 0.64 MG/DL — SIGNIFICANT CHANGE UP (ref 0.5–1.3)
EGFR: 96 ML/MIN/1.73M2 — SIGNIFICANT CHANGE UP
GLUCOSE SERPL-MCNC: 106 MG/DL — HIGH (ref 70–99)
POTASSIUM SERPL-MCNC: 3.6 MMOL/L — SIGNIFICANT CHANGE UP (ref 3.5–5.3)
POTASSIUM SERPL-SCNC: 3.6 MMOL/L — SIGNIFICANT CHANGE UP (ref 3.5–5.3)
SODIUM SERPL-SCNC: 137 MMOL/L — SIGNIFICANT CHANGE UP (ref 135–145)

## 2024-07-15 PROCEDURE — 99233 SBSQ HOSP IP/OBS HIGH 50: CPT

## 2024-07-15 RX ADMIN — POTASSIUM CHLORIDE 10 MILLIEQUIVALENT(S): 600 TABLET, FILM COATED, EXTENDED RELEASE ORAL at 12:33

## 2024-07-15 RX ADMIN — IPRATROPIUM BROMIDE AND ALBUTEROL SULFATE 3 MILLILITER(S): .5; 3 SOLUTION RESPIRATORY (INHALATION) at 14:15

## 2024-07-15 RX ADMIN — Medication 3 MILLIGRAM(S): at 21:02

## 2024-07-15 RX ADMIN — TIOTROPIUM BROMIDE 2 PUFF(S): 18 CAPSULE ORAL; RESPIRATORY (INHALATION) at 08:54

## 2024-07-15 RX ADMIN — Medication 2 PUFF(S): at 19:58

## 2024-07-15 RX ADMIN — IPRATROPIUM BROMIDE AND ALBUTEROL SULFATE 3 MILLILITER(S): .5; 3 SOLUTION RESPIRATORY (INHALATION) at 19:57

## 2024-07-15 RX ADMIN — ENOXAPARIN SODIUM 40 MILLIGRAM(S): 100 INJECTION SUBCUTANEOUS at 06:00

## 2024-07-15 RX ADMIN — IPRATROPIUM BROMIDE AND ALBUTEROL SULFATE 3 MILLILITER(S): .5; 3 SOLUTION RESPIRATORY (INHALATION) at 01:30

## 2024-07-15 RX ADMIN — Medication 1 APPLICATION(S): at 21:04

## 2024-07-15 RX ADMIN — CEFEPIME 1000 MILLIGRAM(S): 1 INJECTION, POWDER, FOR SOLUTION INTRAMUSCULAR; INTRAVENOUS at 21:03

## 2024-07-15 RX ADMIN — Medication 100 MILLIGRAM(S): at 12:32

## 2024-07-15 RX ADMIN — NICOTINE POLACRILEX 1 PATCH: 2 LOZENGE ORAL at 12:31

## 2024-07-15 RX ADMIN — Medication 2 PUFF(S): at 08:54

## 2024-07-15 RX ADMIN — CEFEPIME 1000 MILLIGRAM(S): 1 INJECTION, POWDER, FOR SOLUTION INTRAMUSCULAR; INTRAVENOUS at 14:39

## 2024-07-15 RX ADMIN — INSULIN LISPRO 2: 100 INJECTION, SOLUTION SUBCUTANEOUS at 17:38

## 2024-07-15 RX ADMIN — Medication 25 MILLIGRAM(S): at 21:02

## 2024-07-15 RX ADMIN — Medication 1 APPLICATION(S): at 12:33

## 2024-07-15 RX ADMIN — FUROSEMIDE 40 MILLIGRAM(S): 10 INJECTION, SOLUTION INTRAMUSCULAR; INTRAVENOUS at 12:32

## 2024-07-15 RX ADMIN — CEFEPIME 1000 MILLIGRAM(S): 1 INJECTION, POWDER, FOR SOLUTION INTRAMUSCULAR; INTRAVENOUS at 05:59

## 2024-07-15 RX ADMIN — ASPIRIN 325 MILLIGRAM(S): 325 TABLET, FILM COATED ORAL at 12:32

## 2024-07-15 RX ADMIN — IPRATROPIUM BROMIDE AND ALBUTEROL SULFATE 3 MILLILITER(S): .5; 3 SOLUTION RESPIRATORY (INHALATION) at 08:30

## 2024-07-15 RX ADMIN — Medication 25 MILLIGRAM(S): at 12:33

## 2024-07-15 RX ADMIN — PREDNISONE 40 MILLIGRAM(S): 10 TABLET ORAL at 12:32

## 2024-07-15 RX ADMIN — ATORVASTATIN CALCIUM 80 MILLIGRAM(S): 20 TABLET, FILM COATED ORAL at 21:02

## 2024-07-15 RX ADMIN — Medication 1 MILLIGRAM(S): at 12:32

## 2024-07-15 NOTE — PHYSICAL THERAPY INITIAL EVALUATION ADULT - ADDITIONAL COMMENTS
pt states intermittently using RW since CABG, no AD prior to 6/14. pt has been staying with sister who has 6+6 DEANA. pts home has DEANA with HR, chair lift to basement and typically lives alone. -information from jun 2024. pt from subacute rehab this admission. pt states intermittently using RW since CABG, no AD prior to 6/14. pt has been staying with sister who has 6+6 DEANA. pts home has DEANA with HR, chair lift to basement and typically lives alone. -information from jun 2024. pt from subacute rehab this admission. pt states she has been amb at subacute rehab without AD. rather be DC'd home to sister's vs rehab.

## 2024-07-15 NOTE — PROGRESS NOTE ADULT - SUBJECTIVE AND OBJECTIVE BOX
HPI:  69F current smoker (2ppd x 50 years), current drinker (2 beers/day), with h/o HLD, macular degeneration, and pulmonary nodules (pulmonology follow up and CT chest @6mo recommended), who recently underwent CABG x4 (LIMA-LAD, SVG-Diagonal, SVG-OM, SVG-RCA) on 6/14/24 by Dr. Richards, was found to have wound dehiscence on 6/26, pt was send to ED, wound was debrided with placement of wound vac and was dicharged to rehab with 2 week course of augmentin on 6/28/24. Patient went to rehab at [facility], over the past 3 days the patient states that she has had an increased cough, has phlegm but feels too weak to expectorate and shortness of breath. She was started on upon arrival to [facility] on 3 L of nasal cannula was found to have a pneumonia by chest x-ray approximately 3 days ago started on levaquin and steroids. Yesterday patient was sent to ED for worsening hypoxia and soft BP. Patient denies any recent fevers, chills, sore throat, chest pain, palpitations. No other acute complaints and ROS otherwise benign.    In ED vitals stable. CBC noted for WBC 5.75, H/H 9.5/31.5, Plt 257. D-dimer 1046, CMP noted for CO2 35, BUN/Cr 11/0.59, Lactate wnl, Pro-BNP 1352. UA sterile. COVID/Flu negative, CTA neg for PE, noted for left sided pleural effusion. Patient recieved vanc/cefepime, and solumedrol in ED. Admitted for COPD exacerbation.  (13 Jul 2024 02:26)      MEDICATIONS  (STANDING):  albuterol/ipratropium for Nebulization 3 milliLiter(s) Nebulizer every 6 hours  aspirin enteric coated 325 milliGRAM(s) Oral daily  atorvastatin 80 milliGRAM(s) Oral at bedtime  budesonide 160 MICROgram(s)/formoterol 4.5 MICROgram(s) Inhaler 2 Puff(s) Inhalation two times a day  cefepime  Injectable. 1000 milliGRAM(s) IV Push every 8 hours  dextrose 5%. 1000 milliLiter(s) (50 mL/Hr) IV Continuous <Continuous>  dextrose 5%. 1000 milliLiter(s) (100 mL/Hr) IV Continuous <Continuous>  dextrose 50% Injectable 25 Gram(s) IV Push once  dextrose 50% Injectable 12.5 Gram(s) IV Push once  dextrose 50% Injectable 25 Gram(s) IV Push once  enoxaparin Injectable 40 milliGRAM(s) SubCutaneous every 24 hours  folic acid 1 milliGRAM(s) Oral daily  furosemide    Tablet 40 milliGRAM(s) Oral daily  glucagon  Injectable 1 milliGRAM(s) IntraMuscular once  insulin lispro (ADMELOG) corrective regimen sliding scale   SubCutaneous three times a day before meals  insulin lispro (ADMELOG) corrective regimen sliding scale   SubCutaneous at bedtime  metoprolol tartrate 25 milliGRAM(s) Oral two times a day  nicotine -   7 mG/24Hr(s) Patch 1 Patch Transdermal daily  nystatin Ointment 1 Application(s) Topical every 12 hours  potassium chloride    Tablet ER 10 milliEquivalent(s) Oral daily  predniSONE   Tablet 40 milliGRAM(s) Oral daily  thiamine 100 milliGRAM(s) Oral daily  tiotropium 2.5 MICROgram(s) Inhaler 2 Puff(s) Inhalation every 24 hours    MEDICATIONS  (PRN):  acetaminophen     Tablet .. 650 milliGRAM(s) Oral every 6 hours PRN Temp greater or equal to 38C (100.4F), Mild Pain (1 - 3)  aluminum hydroxide/magnesium hydroxide/simethicone Suspension 30 milliLiter(s) Oral every 4 hours PRN Dyspepsia  dextrose Oral Gel 15 Gram(s) Oral once PRN Blood Glucose LESS THAN 70 milliGRAM(s)/deciliter  melatonin 3 milliGRAM(s) Oral at bedtime PRN Insomnia  ondansetron Injectable 4 milliGRAM(s) IV Push every 8 hours PRN Nausea and/or Vomiting  oxyCODONE    IR 5 milliGRAM(s) Oral every 6 hours PRN Severe Pain (7 - 10)  senna 2 Tablet(s) Oral at bedtime PRN for constipation      Vital Signs Last 24 Hrs  T(C): 36.7 (15 Jul 2024 08:06), Max: 36.7 (14 Jul 2024 21:13)  T(F): 98.1 (15 Jul 2024 08:06), Max: 98.1 (14 Jul 2024 21:13)  HR: 92 (15 Jul 2024 12:35) (68 - 98)  BP: 118/63 (15 Jul 2024 12:35) (114/64 - 163/70)  BP(mean): --  RR: 18 (15 Jul 2024 08:06) (18 - 18)  SpO2: 95% (15 Jul 2024 08:30) (91% - 95%)    Parameters below as of 15 Jul 2024 08:30  Patient On (Oxygen Delivery Method): nasal cannula, 4 L        GEN: NAD, comfortable, resting in bed  HEENT: NC/AT, EOMI, PERRLA, MMM, clear conjunctiva and sclera, normal hearing, no nasal discharge, throat clear, no thrush, normal dentition.   NECK: supple, no JVD, no LAD, no thyromegaly  BACK:  ROM intact, no spinal/paraspinal tenderness  CV: S1S2, RRR, no mumur  RESP: good air movement, CTABL, no rales, rhonchi or wheezing, respirations unlabored  ABD: +BS, soft, ND, NT, no guarding, no rigidity, no HSM  EXT: +2 radial and pedal pulses, no edema, no calve tenderness  SKIN: No visible Rashes or Ulcers  MSK: ROM intact in all extremities  NEURO:  sensory and CN 2-12 Grossly intact, no motor deficits, no, saddle anesthesia, AOx3  PSYCH: normal behavior         LABS:                          10.5   9.76  )-----------( 291      ( 14 Jul 2024 06:47 )             33.9     15 Jul 2024 06:38    137    |  98     |  18     ----------------------------<  106    3.6     |  37     |  0.64     Ca    9.6        15 Jul 2024 06:38          CAPILLARY BLOOD GLUCOSE      POCT Blood Glucose.: 131 mg/dL (15 Jul 2024 11:44)  POCT Blood Glucose.: 112 mg/dL (15 Jul 2024 07:39)  POCT Blood Glucose.: 191 mg/dL (14 Jul 2024 21:23)  POCT Blood Glucose.: 167 mg/dL (14 Jul 2024 17:15)        Urinalysis Basic - ( 15 Jul 2024 06:38 )    Color: x / Appearance: x / SG: x / pH: x  Gluc: 106 mg/dL / Ketone: x  / Bili: x / Urobili: x   Blood: x / Protein: x / Nitrite: x   Leuk Esterase: x / RBC: x / WBC x   Sq Epi: x / Non Sq Epi: x / Bacteria: x        RADIOLOGY:         HPI: 69F current smoker (2ppd x 50 years), current drinker (2 beers/day), with h/o HLD, macular degeneration, and pulmonary nodules (pulmonology follow up and CT chest @6mo recommended), who recently underwent CABG x4 (LIMA-LAD, SVG-Diagonal, SVG-OM, SVG-RCA) on 6/14/24 by Dr. Richards, was found to have wound dehiscence on 6/26, pt was send to ED, wound was debrided with placement of wound vac and was dicharged to rehab with 2 week course of augmentin on 6/28/24. Patient went to rehab at [facility], over the past 3 days the patient states that she has had an increased cough, has phlegm but feels too weak to expectorate and shortness of breath. She was started on upon arrival to [facility] on 3 L of nasal cannula was found to have a pneumonia by chest x-ray approximately 3 days ago started on levaquin and steroids. Yesterday patient was sent to ED for worsening hypoxia and soft BP. Patient denies any recent fevers, chills, sore throat, chest pain, palpitations. No other acute complaints and ROS otherwise benign.  In ED vitals stable. CBC noted for WBC 5.75, H/H 9.5/31.5, Plt 257. D-dimer 1046, CMP noted for CO2 35, BUN/Cr 11/0.59, Lactate wnl, Pro-BNP 1352. UA sterile. COVID/Flu negative, CTA neg for PE, noted for left sided pleural effusion. Patient recieved vanc/cefepime, and solumedrol in ED. Admitted for COPD exacerbation.  (13 Jul 2024 02:26)    7/15: pt seen today, feels well, sitting up in chair at bedside, no complaints to offer    REVIEW OF SYSTEMS:    CONSTITUTIONAL: No weakness, fevers or chills  EYES/ENT: No visual changes;  No vertigo or throat pain   NECK: No pain or stiffness  RESPIRATORY: No cough, wheezing, hemoptysis; No shortness of breath  CARDIOVASCULAR: No chest pain or palpitations  GASTROINTESTINAL: No abdominal or epigastric pain. No nausea, vomiting, or hematemesis; No diarrhea or constipation. No melena or hematochezia.  GENITOURINARY: No dysuria, frequency or hematuria  NEUROLOGICAL: No numbness or weakness  SKIN: No itching, rashes      MEDICATIONS  (STANDING):  albuterol/ipratropium for Nebulization 3 milliLiter(s) Nebulizer every 6 hours  aspirin enteric coated 325 milliGRAM(s) Oral daily  atorvastatin 80 milliGRAM(s) Oral at bedtime  budesonide 160 MICROgram(s)/formoterol 4.5 MICROgram(s) Inhaler 2 Puff(s) Inhalation two times a day  cefepime  Injectable. 1000 milliGRAM(s) IV Push every 8 hours  dextrose 5%. 1000 milliLiter(s) (50 mL/Hr) IV Continuous <Continuous>  dextrose 5%. 1000 milliLiter(s) (100 mL/Hr) IV Continuous <Continuous>  dextrose 50% Injectable 25 Gram(s) IV Push once  dextrose 50% Injectable 12.5 Gram(s) IV Push once  dextrose 50% Injectable 25 Gram(s) IV Push once  enoxaparin Injectable 40 milliGRAM(s) SubCutaneous every 24 hours  folic acid 1 milliGRAM(s) Oral daily  furosemide    Tablet 40 milliGRAM(s) Oral daily  glucagon  Injectable 1 milliGRAM(s) IntraMuscular once  insulin lispro (ADMELOG) corrective regimen sliding scale   SubCutaneous three times a day before meals  insulin lispro (ADMELOG) corrective regimen sliding scale   SubCutaneous at bedtime  metoprolol tartrate 25 milliGRAM(s) Oral two times a day  nicotine -   7 mG/24Hr(s) Patch 1 Patch Transdermal daily  nystatin Ointment 1 Application(s) Topical every 12 hours  potassium chloride    Tablet ER 10 milliEquivalent(s) Oral daily  predniSONE   Tablet 40 milliGRAM(s) Oral daily  thiamine 100 milliGRAM(s) Oral daily  tiotropium 2.5 MICROgram(s) Inhaler 2 Puff(s) Inhalation every 24 hours    MEDICATIONS  (PRN):  acetaminophen     Tablet .. 650 milliGRAM(s) Oral every 6 hours PRN Temp greater or equal to 38C (100.4F), Mild Pain (1 - 3)  aluminum hydroxide/magnesium hydroxide/simethicone Suspension 30 milliLiter(s) Oral every 4 hours PRN Dyspepsia  dextrose Oral Gel 15 Gram(s) Oral once PRN Blood Glucose LESS THAN 70 milliGRAM(s)/deciliter  melatonin 3 milliGRAM(s) Oral at bedtime PRN Insomnia  ondansetron Injectable 4 milliGRAM(s) IV Push every 8 hours PRN Nausea and/or Vomiting  oxyCODONE    IR 5 milliGRAM(s) Oral every 6 hours PRN Severe Pain (7 - 10)  senna 2 Tablet(s) Oral at bedtime PRN for constipation      Vital Signs Last 24 Hrs  T(C): 36.7 (15 Jul 2024 08:06), Max: 36.7 (14 Jul 2024 21:13)  T(F): 98.1 (15 Jul 2024 08:06), Max: 98.1 (14 Jul 2024 21:13)  HR: 92 (15 Jul 2024 12:35) (68 - 98)  BP: 118/63 (15 Jul 2024 12:35) (114/64 - 163/70)  RR: 18 (15 Jul 2024 08:06) (18 - 18)  SpO2: 95% (15 Jul 2024 08:30) (91% - 95%)    Parameters below as of 15 Jul 2024 08:30  Patient On (Oxygen Delivery Method): nasal cannula, 4 L    PHYSICAL EXAM:  GENERAL: NAD, lying in bed comfortably  HEAD:  Atraumatic, Normocephalic  EYES: conjunctiva and sclera clear  ENT: Moist mucous membranes  NECK: Supple, No JVD  CHEST/LUNG: Clear to auscultation bilaterally; No rales, rhonchi, wheezing. Unlabored respirations  HEART: Regular rate and rhythm; No murmurs  ABDOMEN: Bowel sounds present; Soft, Nontender, Nondistended.   EXTREMITIES:  2+ Peripheral Pulses, brisk capillary refill. No clubbing, cyanosis, or edema  NERVOUS SYSTEM:  Alert & Oriented X3, speech clear. No deficits   MSK: FROM all 4 extremities, full and equal strength        LABS:                          10.5   9.76  )-----------( 291      ( 14 Jul 2024 06:47 )             33.9     15 Jul 2024 06:38    137    |  98     |  18     ----------------------------<  106    3.6     |  37     |  0.64     Ca    9.6        15 Jul 2024 06:38          CAPILLARY BLOOD GLUCOSE      POCT Blood Glucose.: 131 mg/dL (15 Jul 2024 11:44)  POCT Blood Glucose.: 112 mg/dL (15 Jul 2024 07:39)  POCT Blood Glucose.: 191 mg/dL (14 Jul 2024 21:23)  POCT Blood Glucose.: 167 mg/dL (14 Jul 2024 17:15)        Urinalysis Basic - ( 15 Jul 2024 06:38 )    Color: x / Appearance: x / SG: x / pH: x  Gluc: 106 mg/dL / Ketone: x  / Bili: x / Urobili: x   Blood: x / Protein: x / Nitrite: x   Leuk Esterase: x / RBC: x / WBC x   Sq Epi: x / Non Sq Epi: x / Bacteria: x        RADIOLOGY:

## 2024-07-15 NOTE — PROGRESS NOTE ADULT - SUBJECTIVE AND OBJECTIVE BOX
Date of service: 07-15-24 @ 16:44    pt seen and examined  feels better  afebrile      ROS: no fever or chills; denies dizziness, no HA,  no abdominal pain, no diarrhea or constipation; no dysuria, no urinary frequency, no legs pain, no rashes      MEDICATIONS  (STANDING):  albuterol/ipratropium for Nebulization 3 milliLiter(s) Nebulizer every 6 hours  aspirin enteric coated 325 milliGRAM(s) Oral daily  atorvastatin 80 milliGRAM(s) Oral at bedtime  budesonide 160 MICROgram(s)/formoterol 4.5 MICROgram(s) Inhaler 2 Puff(s) Inhalation two times a day  cefepime  Injectable. 1000 milliGRAM(s) IV Push every 8 hours  dextrose 5%. 1000 milliLiter(s) (100 mL/Hr) IV Continuous <Continuous>  dextrose 5%. 1000 milliLiter(s) (50 mL/Hr) IV Continuous <Continuous>  dextrose 50% Injectable 12.5 Gram(s) IV Push once  dextrose 50% Injectable 25 Gram(s) IV Push once  dextrose 50% Injectable 25 Gram(s) IV Push once  enoxaparin Injectable 40 milliGRAM(s) SubCutaneous every 24 hours  folic acid 1 milliGRAM(s) Oral daily  furosemide    Tablet 40 milliGRAM(s) Oral daily  glucagon  Injectable 1 milliGRAM(s) IntraMuscular once  insulin lispro (ADMELOG) corrective regimen sliding scale   SubCutaneous three times a day before meals  insulin lispro (ADMELOG) corrective regimen sliding scale   SubCutaneous at bedtime  metoprolol tartrate 25 milliGRAM(s) Oral two times a day  nicotine -   7 mG/24Hr(s) Patch 1 Patch Transdermal daily  nystatin Ointment 1 Application(s) Topical every 12 hours  potassium chloride    Tablet ER 10 milliEquivalent(s) Oral daily  predniSONE   Tablet 40 milliGRAM(s) Oral daily  thiamine 100 milliGRAM(s) Oral daily  tiotropium 2.5 MICROgram(s) Inhaler 2 Puff(s) Inhalation every 24 hours    Vital Signs Last 24 Hrs  T(C): 36.7 (15 Jul 2024 15:40), Max: 36.7 (14 Jul 2024 21:13)  T(F): 98.1 (15 Jul 2024 15:40), Max: 98.1 (14 Jul 2024 21:13)  HR: 72 (15 Jul 2024 15:40) (68 - 98)  BP: 127/61 (15 Jul 2024 15:40) (114/64 - 148/72)  BP(mean): 82 (15 Jul 2024 15:40) (82 - 82)  RR: 18 (15 Jul 2024 15:40) (18 - 18)  SpO2: 91% (15 Jul 2024 15:40) (91% - 95%)    Parameters below as of 15 Jul 2024 15:40  Patient On (Oxygen Delivery Method): nasal cannula  O2 Flow (L/min): 4            PE:  Constitutional: NAD   HEENT: NC/AT, EOMI, PERRLA, conjunctivae clear; ears and nose atraumatic; pharynx benign  Neck: supple; thyroid not palpable  Back: no tenderness  Respiratory: decreased breath sounds  Cardiovascular: S1S2 regular, no murmurs  Abdomen: soft, not tender, not distended, positive BS; liver and spleen WNL  Genitourinary: no suprapubic tenderness  Lymphatic: no LN palpable  Musculoskeletal: no muscle tenderness, no joint swelling or tenderness  Extremities: no pedal edema  Neurological/ Psychiatric: AxOx3, Judgement and insight normal;  moving all extremities  Skin: no rashes; no palpable lesions; sternal wound    Labs: all available labs reviewed                                   10.5   9.76  )-----------( 291      ( 14 Jul 2024 06:47 )             33.9     07-15    137  |  98  |  18  ----------------------------<  106<H>  3.6   |  37<H>  |  0.64    Ca    9.6      15 Jul 2024 06:38      Urinalysis Basic - ( 13 Jul 2024 06:14 )    Color: x / Appearance: x / SG: x / pH: x  Gluc: 116 mg/dL / Ketone: x  / Bili: x / Urobili: x   Blood: x / Protein: x / Nitrite: x   Leuk Esterase: x / RBC: x / WBC x   Sq Epi: x / Non Sq Epi: x / Bacteria: x      Culture - Urine (07.12.24 @ 20:25)   Specimen Source: .Urine None  Culture Results:   <10,000 CFU/mL Normal Urogenital Cathie  Culture - Blood (07.12.24 @ 17:59)   Specimen Source: .Blood Blood-Peripheral  Culture Results:   No growth at 24 hours    Radiology: all available radiological tests reviewed  < from: CT Angio Chest PE Protocol w/ IV Cont (07.12.24 @ 21:40) >    ACC: [id number] EXAM:  CT ANGIO CHEST PULM [facility]   ORDERED BY: SUNITHA JARAMILLO     PROCEDURE DATE:  07/12/2024          INTERPRETATION:  CLINICAL INFORMATION: Chest pain and shortness of breath    COMPARISON: CT chest 6/12/2024    CONTRAST/COMPLICATIONS:  IV Contrast: Omnipaque 350  70 cc administered   0 cc discarded  Oral Contrast: NONE  Complications: None reported at time of study completion    PROCEDURE:  CT Angiography of the Chest.  Sagittal and coronal reformats were performed as well as 3D (MIP)   reconstructions.    FINDINGS:    LUNGS AND LARGE AIRWAYS: Patent central airways. Intralobular emphysema.   Subsegmental compressive atelectasis in the left lower lobe adjacent to   pleural fluid. Dependent right basilar and lingular atelectasis. No   pulmonary nodules.  PLEURA: Small to moderate left pleural effusion, new since prior study.  VESSELS: No pulmonary embolus. Aortic calcification. Status post CABG.  HEART: Cardiomegaly. Small pericardial effusion.  MEDIASTINUM AND SUZANNE: No lymphadenopathy.  CHEST WALL AND LOWER NECK: Within normal limits.  VISUALIZED UPPER ABDOMEN: Within normal limits.  BONES: Degenerative changes.    IMPRESSION:  No pulmonary embolus.    Small to moderate left pleural effusion.        Advanced directives addressed: full resuscitation

## 2024-07-15 NOTE — PHYSICAL THERAPY INITIAL EVALUATION ADULT - GENERAL OBSERVATIONS, REHAB EVAL
Pt seen for 45min PT Eval. Pt rec'd sitting in chair in NAD satting 89% on 2L O2 NC, RN increased to 3L O@ satting 92%. Pt trans and amb 25ft, seated rest break sattin 84% increased to 4L NC O2, pt amb back to room 25ft satting 89% on 4L left with CNA in bathroom, RN aware.

## 2024-07-15 NOTE — PHYSICAL THERAPY INITIAL EVALUATION ADULT - PERTINENT HX OF CURRENT PROBLEM, REHAB EVAL
69F current smoker (2ppd x 50 years), current drinker (2 beers/day), with h/o HLD, macular degeneration, and pulmonary nodules (pulmonology follow up and CT chest @6mo recommended), who recently underwent CABG x4 (LIMA-LAD, SVG-Diagonal, SVG-OM, SVG-RCA) on 6/14/24 by Dr. Richards, was found to have wound dehiscence on 6/26, pt was send to ED, wound was debrided with placement of wound vac and was discharged to rehab with 2 week course of augmentin on 6/28/24. Patient went to rehab at [facility], over the past 3 days the patient states that she has had an increased cough, has phlegm but feels too weak to expectorate and shortness of breath. 68yo Female current smoker (2ppd x 50 years), current drinker (2 beers/day), with h/o HLD, macular degeneration, and pulmonary nodules (pulmonology follow up and CT chest @6mo recommended), who recently underwent CABG x4 (LIMA-LAD, SVG-Diagonal, SVG-OM, SVG-RCA) on 6/14/24 by Dr. Richards, was found to have wound dehiscence on 6/26, pt was send to ED, wound was debrided with placement of wound vac and was discharged to rehab with 2 week course of augmentin on 6/28/24. Patient went to rehab at [facility], over the past 3 days the patient states that she has had an increased cough, has phlegm but feels too weak to expectorate and shortness of breath.

## 2024-07-15 NOTE — PROGRESS NOTE ADULT - SUBJECTIVE AND OBJECTIVE BOX
Subjective:    pat better, sitting in chair, no new complaint, 4lpm nc sat 91%.    Home Medications:  acetaminophen 325 mg oral tablet: 2 tab(s) orally every 6 hours As needed Mild Pain (1 - 3), Moderate Pain (4 - 6) (12 Jul 2024 23:54)  aspirin 325 mg oral delayed release tablet: 1 tab(s) orally once a day (12 Jul 2024 23:54)  atorvastatin 80 mg oral tablet: 1 tab(s) orally once a day (at bedtime) (12 Jul 2024 23:54)  enoxaparin: 40 milligram(s) subcutaneous once a day dvt prophylaxis (12 Jul 2024 23:54)  fluticasone-salmeterol 250 mcg-50 mcg inhalation powder: inhaled as needed for (12 Jul 2024 23:54)  Lasix 40 mg oral tablet: 1 tab(s) orally once a day (13 Jul 2024 02:38)  metoprolol tartrate 25 mg oral tablet: 1 tab(s) orally 2 times a day (12 Jul 2024 23:54)  nystatin 100,000 units/g topical ointment: Apply topically to affected area 2 times a day (13 Jul 2024 02:38)  oxyCODONE 5 mg oral capsule: 1 cap(s) orally every 6 hours as needed for  severe pain (13 Jul 2024 02:38)  potassium chloride: 10 milliequivalent(s) orally once a day (13 Jul 2024 02:38)    MEDICATIONS  (STANDING):  albuterol/ipratropium for Nebulization 3 milliLiter(s) Nebulizer every 6 hours  aspirin enteric coated 325 milliGRAM(s) Oral daily  atorvastatin 80 milliGRAM(s) Oral at bedtime  budesonide 160 MICROgram(s)/formoterol 4.5 MICROgram(s) Inhaler 2 Puff(s) Inhalation two times a day  cefepime  Injectable. 1000 milliGRAM(s) IV Push every 8 hours  dextrose 5%. 1000 milliLiter(s) (100 mL/Hr) IV Continuous <Continuous>  dextrose 5%. 1000 milliLiter(s) (50 mL/Hr) IV Continuous <Continuous>  dextrose 50% Injectable 12.5 Gram(s) IV Push once  dextrose 50% Injectable 25 Gram(s) IV Push once  dextrose 50% Injectable 25 Gram(s) IV Push once  enoxaparin Injectable 40 milliGRAM(s) SubCutaneous every 24 hours  folic acid 1 milliGRAM(s) Oral daily  furosemide    Tablet 40 milliGRAM(s) Oral daily  glucagon  Injectable 1 milliGRAM(s) IntraMuscular once  insulin lispro (ADMELOG) corrective regimen sliding scale   SubCutaneous three times a day before meals  insulin lispro (ADMELOG) corrective regimen sliding scale   SubCutaneous at bedtime  metoprolol tartrate 25 milliGRAM(s) Oral two times a day  nicotine -   7 mG/24Hr(s) Patch 1 Patch Transdermal daily  nystatin Ointment 1 Application(s) Topical every 12 hours  potassium chloride    Tablet ER 10 milliEquivalent(s) Oral daily  predniSONE   Tablet 40 milliGRAM(s) Oral daily  thiamine 100 milliGRAM(s) Oral daily  tiotropium 2.5 MICROgram(s) Inhaler 2 Puff(s) Inhalation every 24 hours    MEDICATIONS  (PRN):  acetaminophen     Tablet .. 650 milliGRAM(s) Oral every 6 hours PRN Temp greater or equal to 38C (100.4F), Mild Pain (1 - 3)  aluminum hydroxide/magnesium hydroxide/simethicone Suspension 30 milliLiter(s) Oral every 4 hours PRN Dyspepsia  dextrose Oral Gel 15 Gram(s) Oral once PRN Blood Glucose LESS THAN 70 milliGRAM(s)/deciliter  melatonin 3 milliGRAM(s) Oral at bedtime PRN Insomnia  ondansetron Injectable 4 milliGRAM(s) IV Push every 8 hours PRN Nausea and/or Vomiting  oxyCODONE    IR 5 milliGRAM(s) Oral every 6 hours PRN Severe Pain (7 - 10)  senna 2 Tablet(s) Oral at bedtime PRN for constipation      Allergies    No Known Allergies    Intolerances        Vital Signs Last 24 Hrs  T(C): 36.7 (15 Jul 2024 15:40), Max: 36.7 (14 Jul 2024 21:13)  T(F): 98.1 (15 Jul 2024 15:40), Max: 98.1 (14 Jul 2024 21:13)  HR: 72 (15 Jul 2024 15:40) (68 - 98)  BP: 127/61 (15 Jul 2024 15:40) (114/64 - 148/72)  BP(mean): 82 (15 Jul 2024 15:40) (82 - 82)  RR: 18 (15 Jul 2024 15:40) (18 - 18)  SpO2: 91% (15 Jul 2024 15:40) (91% - 95%)    Parameters below as of 15 Jul 2024 15:40  Patient On (Oxygen Delivery Method): nasal cannula  O2 Flow (L/min): 4        PHYSICAL EXAMINATION:    NECK:  Supple. No lymphadenopathy. Jugular venous pressure not elevated. Carotids equal.   HEART:   The cardiac impulse has a normal quality. Reg., Nl S1 and S2.  There are no murmurs, rubs or gallops noted  CHEST:  Chest crackles to auscultation. Normal respiratory effort.  ABDOMEN:  Soft and nontender.   EXTREMITIES:  There is no edema.       LABS:                        10.5   9.76  )-----------( 291      ( 14 Jul 2024 06:47 )             33.9     07-15    137  |  98  |  18  ----------------------------<  106<H>  3.6   |  37<H>  |  0.64    Ca    9.6      15 Jul 2024 06:38        Urinalysis Basic - ( 15 Jul 2024 06:38 )    Color: x / Appearance: x / SG: x / pH: x  Gluc: 106 mg/dL / Ketone: x  / Bili: x / Urobili: x   Blood: x / Protein: x / Nitrite: x   Leuk Esterase: x / RBC: x / WBC x   Sq Epi: x / Non Sq Epi: x / Bacteria: x

## 2024-07-16 ENCOUNTER — TRANSCRIPTION ENCOUNTER (OUTPATIENT)
Age: 69
End: 2024-07-16

## 2024-07-16 VITALS
SYSTOLIC BLOOD PRESSURE: 136 MMHG | HEART RATE: 84 BPM | TEMPERATURE: 98 F | OXYGEN SATURATION: 95 % | DIASTOLIC BLOOD PRESSURE: 66 MMHG

## 2024-07-16 PROCEDURE — 99239 HOSP IP/OBS DSCHRG MGMT >30: CPT

## 2024-07-16 PROCEDURE — 99231 SBSQ HOSP IP/OBS SF/LOW 25: CPT

## 2024-07-16 RX ORDER — CEFUROXIME SODIUM 7.5 G
1 VIAL (EA) INTRAVENOUS
Qty: 0 | Refills: 0 | DISCHARGE
Start: 2024-07-16

## 2024-07-16 RX ORDER — IPRATROPIUM BROMIDE AND ALBUTEROL SULFATE .5; 3 MG/3ML; MG/3ML
3 SOLUTION RESPIRATORY (INHALATION)
Qty: 0 | Refills: 0 | DISCHARGE
Start: 2024-07-16

## 2024-07-16 RX ORDER — CEFUROXIME SODIUM 7.5 G
500 VIAL (EA) INTRAVENOUS EVERY 12 HOURS
Refills: 0 | Status: DISCONTINUED | OUTPATIENT
Start: 2024-07-16 | End: 2024-07-16

## 2024-07-16 RX ORDER — PREDNISONE 10 MG/1
2 TABLET ORAL
Qty: 0 | Refills: 0 | DISCHARGE
Start: 2024-07-16

## 2024-07-16 RX ORDER — DOXYCYCLINE 100 MG/1
2 CAPSULE ORAL
Qty: 0 | Refills: 0 | DISCHARGE
Start: 2024-07-16

## 2024-07-16 RX ORDER — DOXYCYCLINE 100 MG/1
100 CAPSULE ORAL EVERY 12 HOURS
Refills: 0 | Status: DISCONTINUED | OUTPATIENT
Start: 2024-07-16 | End: 2024-07-16

## 2024-07-16 RX ADMIN — TIOTROPIUM BROMIDE 2 PUFF(S): 18 CAPSULE ORAL; RESPIRATORY (INHALATION) at 08:51

## 2024-07-16 RX ADMIN — NICOTINE POLACRILEX 1 PATCH: 2 LOZENGE ORAL at 11:14

## 2024-07-16 RX ADMIN — POTASSIUM CHLORIDE 10 MILLIEQUIVALENT(S): 600 TABLET, FILM COATED, EXTENDED RELEASE ORAL at 11:21

## 2024-07-16 RX ADMIN — PREDNISONE 40 MILLIGRAM(S): 10 TABLET ORAL at 11:22

## 2024-07-16 RX ADMIN — CEFEPIME 1000 MILLIGRAM(S): 1 INJECTION, POWDER, FOR SOLUTION INTRAMUSCULAR; INTRAVENOUS at 05:02

## 2024-07-16 RX ADMIN — IPRATROPIUM BROMIDE AND ALBUTEROL SULFATE 3 MILLILITER(S): .5; 3 SOLUTION RESPIRATORY (INHALATION) at 02:09

## 2024-07-16 RX ADMIN — Medication 25 MILLIGRAM(S): at 11:21

## 2024-07-16 RX ADMIN — Medication 1 APPLICATION(S): at 11:20

## 2024-07-16 RX ADMIN — NICOTINE POLACRILEX 1 PATCH: 2 LOZENGE ORAL at 11:22

## 2024-07-16 RX ADMIN — ASPIRIN 325 MILLIGRAM(S): 325 TABLET, FILM COATED ORAL at 11:22

## 2024-07-16 RX ADMIN — Medication 1 MILLIGRAM(S): at 11:20

## 2024-07-16 RX ADMIN — Medication 100 MILLIGRAM(S): at 11:21

## 2024-07-16 RX ADMIN — Medication 2 PUFF(S): at 08:51

## 2024-07-16 RX ADMIN — IPRATROPIUM BROMIDE AND ALBUTEROL SULFATE 3 MILLILITER(S): .5; 3 SOLUTION RESPIRATORY (INHALATION) at 08:50

## 2024-07-16 RX ADMIN — FUROSEMIDE 40 MILLIGRAM(S): 10 INJECTION, SOLUTION INTRAMUSCULAR; INTRAVENOUS at 11:21

## 2024-07-16 RX ADMIN — IPRATROPIUM BROMIDE AND ALBUTEROL SULFATE 3 MILLILITER(S): .5; 3 SOLUTION RESPIRATORY (INHALATION) at 14:16

## 2024-07-16 RX ADMIN — ENOXAPARIN SODIUM 40 MILLIGRAM(S): 100 INJECTION SUBCUTANEOUS at 06:01

## 2024-07-16 NOTE — DISCHARGE NOTE PROVIDER - NSDCMRMEDTOKEN_GEN_ALL_CORE_FT
acetaminophen 325 mg oral tablet: 2 tab(s) orally every 6 hours As needed Mild Pain (1 - 3), Moderate Pain (4 - 6)  aspirin 325 mg oral delayed release tablet: 1 tab(s) orally once a day  atorvastatin 80 mg oral tablet: 1 tab(s) orally once a day (at bedtime)  budesonide-formoterol 160 mcg-4.5 mcg/inh inhalation aerosol: 2 puff(s) inhaled 2 times a day  enoxaparin: 40 milligram(s) subcutaneous once a day dvt prophylaxis  fluticasone-salmeterol 250 mcg-50 mcg inhalation powder: inhaled as needed for  folic acid 1 mg oral tablet: 1 tab(s) orally once a day  Lasix 40 mg oral tablet: 1 tab(s) orally once a day  metoprolol tartrate 25 mg oral tablet: 1 tab(s) orally 2 times a day  nicotine 7 mg/24 hr transdermal film, extended release: 1 patch transdermal once a day  nystatin 100,000 units/g topical ointment: Apply topically to affected area 2 times a day  oxyCODONE 5 mg oral capsule: 1 cap(s) orally every 6 hours as needed for  severe pain  potassium chloride: 10 milliequivalent(s) orally once a day  senna leaf extract oral tablet: 2 tab(s) orally once a day (at bedtime) as needed for  constipation  thiamine 100 mg oral tablet: 1 tab(s) orally once a day  tiotropium 2.5 mcg/inh inhalation aerosol: 2 puff(s) inhaled once a day   acetaminophen 325 mg oral tablet: 2 tab(s) orally every 6 hours As needed Mild Pain (1 - 3), Moderate Pain (4 - 6)  aspirin 325 mg oral delayed release tablet: 1 tab(s) orally once a day  atorvastatin 80 mg oral tablet: 1 tab(s) orally once a day (at bedtime)  budesonide-formoterol 160 mcg-4.5 mcg/inh inhalation aerosol: 2 puff(s) inhaled 2 times a day  cefuroxime 500 mg oral tablet: 1 tab(s) orally every 12 hours for 7 days  doxycycline monohydrate 50 mg oral capsule: 2 cap(s) orally every 12 hours for 5 days  enoxaparin: 40 milligram(s) subcutaneous once a day dvt prophylaxis  fluticasone-salmeterol 250 mcg-50 mcg inhalation powder: inhaled as needed for  folic acid 1 mg oral tablet: 1 tab(s) orally once a day  ipratropium-albuterol 0.5 mg-2.5 mg/3 mL inhalation solution: 3 milliliter(s) inhaled every 6 hours as needed for  shortness of breath and/or wheezing  Lasix 40 mg oral tablet: 1 tab(s) orally once a day  metoprolol tartrate 25 mg oral tablet: 1 tab(s) orally 2 times a day  nicotine 7 mg/24 hr transdermal film, extended release: 1 patch transdermal once a day  nystatin 100,000 units/g topical ointment: Apply topically to affected area 2 times a day  oxyCODONE 5 mg oral capsule: 1 cap(s) orally every 6 hours as needed for  severe pain  potassium chloride: 10 milliequivalent(s) orally once a day  senna leaf extract oral tablet: 2 tab(s) orally once a day (at bedtime) as needed for  constipation  thiamine 100 mg oral tablet: 1 tab(s) orally once a day  tiotropium 2.5 mcg/inh inhalation aerosol: 2 puff(s) inhaled once a day   acetaminophen 325 mg oral tablet: 2 tab(s) orally every 6 hours As needed Mild Pain (1 - 3), Moderate Pain (4 - 6)  aspirin 325 mg oral delayed release tablet: 1 tab(s) orally once a day  atorvastatin 80 mg oral tablet: 1 tab(s) orally once a day (at bedtime)  budesonide-formoterol 160 mcg-4.5 mcg/inh inhalation aerosol: 2 puff(s) inhaled 2 times a day  cefuroxime 500 mg oral tablet: 1 tab(s) orally every 12 hours for 7 days  doxycycline monohydrate 50 mg oral capsule: 2 cap(s) orally every 12 hours for 5 days  enoxaparin: 40 milligram(s) subcutaneous once a day dvt prophylaxis  fluticasone-salmeterol 250 mcg-50 mcg inhalation powder: inhaled as needed for  folic acid 1 mg oral tablet: 1 tab(s) orally once a day  ipratropium-albuterol 0.5 mg-2.5 mg/3 mL inhalation solution: 3 milliliter(s) inhaled every 6 hours as needed for  shortness of breath and/or wheezing  Lasix 40 mg oral tablet: 1 tab(s) orally once a day  metoprolol tartrate 25 mg oral tablet: 1 tab(s) orally 2 times a day  nicotine 7 mg/24 hr transdermal film, extended release: 1 patch transdermal once a day  nystatin 100,000 units/g topical ointment: Apply topically to affected area 2 times a day  oxyCODONE 5 mg oral capsule: 1 cap(s) orally every 6 hours as needed for  severe pain  potassium chloride: 10 milliequivalent(s) orally once a day  predniSONE 20 mg oral tablet: 2 tab(s) orally once a day one more day 7/17  senna leaf extract oral tablet: 2 tab(s) orally once a day (at bedtime) as needed for  constipation  thiamine 100 mg oral tablet: 1 tab(s) orally once a day  tiotropium 2.5 mcg/inh inhalation aerosol: 2 puff(s) inhaled once a day

## 2024-07-16 NOTE — PROGRESS NOTE ADULT - SUBJECTIVE AND OBJECTIVE BOX
Subjective:    pat better, sitting in chair, no new complaint.    Home Medications:  acetaminophen 325 mg oral tablet: 2 tab(s) orally every 6 hours As needed Mild Pain (1 - 3), Moderate Pain (4 - 6) (12 Jul 2024 23:54)  aspirin 325 mg oral delayed release tablet: 1 tab(s) orally once a day (12 Jul 2024 23:54)  atorvastatin 80 mg oral tablet: 1 tab(s) orally once a day (at bedtime) (12 Jul 2024 23:54)  cefuroxime 500 mg oral tablet: 1 tab(s) orally every 12 hours for 7 days (16 Jul 2024 12:43)  doxycycline monohydrate 50 mg oral capsule: 2 cap(s) orally every 12 hours for 5 days (16 Jul 2024 12:43)  enoxaparin: 40 milligram(s) subcutaneous once a day dvt prophylaxis (12 Jul 2024 23:54)  fluticasone-salmeterol 250 mcg-50 mcg inhalation powder: inhaled as needed for (12 Jul 2024 23:54)  ipratropium-albuterol 0.5 mg-2.5 mg/3 mL inhalation solution: 3 milliliter(s) inhaled every 6 hours as needed for  shortness of breath and/or wheezing (16 Jul 2024 12:43)  Lasix 40 mg oral tablet: 1 tab(s) orally once a day (13 Jul 2024 02:38)  metoprolol tartrate 25 mg oral tablet: 1 tab(s) orally 2 times a day (12 Jul 2024 23:54)  nystatin 100,000 units/g topical ointment: Apply topically to affected area 2 times a day (13 Jul 2024 02:38)  oxyCODONE 5 mg oral capsule: 1 cap(s) orally every 6 hours as needed for  severe pain (13 Jul 2024 02:38)  potassium chloride: 10 milliequivalent(s) orally once a day (13 Jul 2024 02:38)  predniSONE 20 mg oral tablet: 2 tab(s) orally once a day one more day 7/17 (16 Jul 2024 12:48)    MEDICATIONS  (STANDING):  albuterol/ipratropium for Nebulization 3 milliLiter(s) Nebulizer every 6 hours  aspirin enteric coated 325 milliGRAM(s) Oral daily  atorvastatin 80 milliGRAM(s) Oral at bedtime  budesonide 160 MICROgram(s)/formoterol 4.5 MICROgram(s) Inhaler 2 Puff(s) Inhalation two times a day  cefuroxime   Tablet 500 milliGRAM(s) Oral every 12 hours  dextrose 5%. 1000 milliLiter(s) (50 mL/Hr) IV Continuous <Continuous>  dextrose 5%. 1000 milliLiter(s) (100 mL/Hr) IV Continuous <Continuous>  dextrose 50% Injectable 25 Gram(s) IV Push once  dextrose 50% Injectable 12.5 Gram(s) IV Push once  dextrose 50% Injectable 25 Gram(s) IV Push once  doxycycline monohydrate Capsule 100 milliGRAM(s) Oral every 12 hours  enoxaparin Injectable 40 milliGRAM(s) SubCutaneous every 24 hours  folic acid 1 milliGRAM(s) Oral daily  furosemide    Tablet 40 milliGRAM(s) Oral daily  glucagon  Injectable 1 milliGRAM(s) IntraMuscular once  insulin lispro (ADMELOG) corrective regimen sliding scale   SubCutaneous at bedtime  insulin lispro (ADMELOG) corrective regimen sliding scale   SubCutaneous three times a day before meals  metoprolol tartrate 25 milliGRAM(s) Oral two times a day  nicotine -   7 mG/24Hr(s) Patch 1 Patch Transdermal daily  nystatin Ointment 1 Application(s) Topical every 12 hours  potassium chloride    Tablet ER 10 milliEquivalent(s) Oral daily  predniSONE   Tablet 40 milliGRAM(s) Oral daily  thiamine 100 milliGRAM(s) Oral daily  tiotropium 2.5 MICROgram(s) Inhaler 2 Puff(s) Inhalation every 24 hours    MEDICATIONS  (PRN):  acetaminophen     Tablet .. 650 milliGRAM(s) Oral every 6 hours PRN Temp greater or equal to 38C (100.4F), Mild Pain (1 - 3)  aluminum hydroxide/magnesium hydroxide/simethicone Suspension 30 milliLiter(s) Oral every 4 hours PRN Dyspepsia  dextrose Oral Gel 15 Gram(s) Oral once PRN Blood Glucose LESS THAN 70 milliGRAM(s)/deciliter  melatonin 3 milliGRAM(s) Oral at bedtime PRN Insomnia  ondansetron Injectable 4 milliGRAM(s) IV Push every 8 hours PRN Nausea and/or Vomiting  oxyCODONE    IR 5 milliGRAM(s) Oral every 6 hours PRN Severe Pain (7 - 10)  senna 2 Tablet(s) Oral at bedtime PRN for constipation      Allergies    No Known Allergies    Intolerances        Vital Signs Last 24 Hrs  T(C): 36.8 (16 Jul 2024 16:09), Max: 37 (15 Jul 2024 20:35)  T(F): 98.2 (16 Jul 2024 16:09), Max: 98.6 (15 Jul 2024 20:35)  HR: 84 (16 Jul 2024 16:09) (58 - 87)  BP: 136/66 (16 Jul 2024 16:09) (114/63 - 136/66)  BP(mean): --  RR: 18 (16 Jul 2024 07:51) (18 - 18)  SpO2: 95% (16 Jul 2024 16:09) (92% - 98%)    Parameters below as of 16 Jul 2024 16:09  Patient On (Oxygen Delivery Method): nasal cannula  O2 Flow (L/min): 4        PHYSICAL EXAMINATION:    NECK:  Supple. No lymphadenopathy. Jugular venous pressure not elevated. Carotids equal.   HEART:   The cardiac impulse has a normal quality. Reg., Nl S1 and S2.  There are no murmurs, rubs or gallops noted  CHEST:  Chest crackles to auscultation. Normal respiratory effort.  ABDOMEN:  Soft and nontender.   EXTREMITIES:  There is no edema.       LABS:    07-15    137  |  98  |  18  ----------------------------<  106<H>  3.6   |  37<H>  |  0.64    Ca    9.6      15 Jul 2024 06:38        Urinalysis Basic - ( 15 Jul 2024 06:38 )    Color: x / Appearance: x / SG: x / pH: x  Gluc: 106 mg/dL / Ketone: x  / Bili: x / Urobili: x   Blood: x / Protein: x / Nitrite: x   Leuk Esterase: x / RBC: x / WBC x   Sq Epi: x / Non Sq Epi: x / Bacteria: x

## 2024-07-16 NOTE — DISCHARGE NOTE PROVIDER - PROVIDER TOKENS
PROVIDER:[TOKEN:[[id number]],FOLLOWUP:[1 week]],PROVIDER:[TOKEN:[[id number]],FOLLOWUP:[2 weeks]]

## 2024-07-16 NOTE — DISCHARGE NOTE PROVIDER - CARE PROVIDER_API CALL
Chaitanya Richards  Thoracic and Cardiac Surgery  [address] [place] [address]  Phone: [phone]  Fax: [phone]  Follow Up Time: 1 week    Baldo Brower  Pulmonary Disease  [address] [place] [address]  Phone: [phone]  Fax: [phone]  Follow Up Time: 2 weeks

## 2024-07-16 NOTE — PROGRESS NOTE ADULT - SUBJECTIVE AND OBJECTIVE BOX
Date of service: 07-16-24 @ 09:50    pt seen and examined  on 4L NC   no resp distress   afebrile    ROS: no fever or chills; denies dizziness, no HA,  no abdominal pain, no diarrhea or constipation; no dysuria, no urinary frequency, no legs pain, no rashes    MEDICATIONS  (STANDING):  albuterol/ipratropium for Nebulization 3 milliLiter(s) Nebulizer every 6 hours  aspirin enteric coated 325 milliGRAM(s) Oral daily  atorvastatin 80 milliGRAM(s) Oral at bedtime  budesonide 160 MICROgram(s)/formoterol 4.5 MICROgram(s) Inhaler 2 Puff(s) Inhalation two times a day  cefepime  Injectable. 1000 milliGRAM(s) IV Push every 8 hours  dextrose 5%. 1000 milliLiter(s) (50 mL/Hr) IV Continuous <Continuous>  dextrose 5%. 1000 milliLiter(s) (100 mL/Hr) IV Continuous <Continuous>  dextrose 50% Injectable 12.5 Gram(s) IV Push once  dextrose 50% Injectable 25 Gram(s) IV Push once  dextrose 50% Injectable 25 Gram(s) IV Push once  enoxaparin Injectable 40 milliGRAM(s) SubCutaneous every 24 hours  folic acid 1 milliGRAM(s) Oral daily  furosemide    Tablet 40 milliGRAM(s) Oral daily  glucagon  Injectable 1 milliGRAM(s) IntraMuscular once  insulin lispro (ADMELOG) corrective regimen sliding scale   SubCutaneous three times a day before meals  insulin lispro (ADMELOG) corrective regimen sliding scale   SubCutaneous at bedtime  metoprolol tartrate 25 milliGRAM(s) Oral two times a day  nicotine -   7 mG/24Hr(s) Patch 1 Patch Transdermal daily  nystatin Ointment 1 Application(s) Topical every 12 hours  potassium chloride    Tablet ER 10 milliEquivalent(s) Oral daily  predniSONE   Tablet 40 milliGRAM(s) Oral daily  thiamine 100 milliGRAM(s) Oral daily  tiotropium 2.5 MICROgram(s) Inhaler 2 Puff(s) Inhalation every 24 hours    Vital Signs Last 24 Hrs  T(C): 36.5 (16 Jul 2024 07:51), Max: 37 (15 Jul 2024 20:35)  T(F): 97.7 (16 Jul 2024 07:51), Max: 98.6 (15 Jul 2024 20:35)  HR: 58 (16 Jul 2024 07:51) (58 - 92)  BP: 123/74 (16 Jul 2024 07:51) (114/63 - 127/61)  BP(mean): 82 (15 Jul 2024 15:40) (82 - 82)  RR: 18 (16 Jul 2024 07:51) (18 - 18)  SpO2: 98% (16 Jul 2024 07:51) (91% - 98%)    Parameters below as of 16 Jul 2024 07:51  Patient On (Oxygen Delivery Method): nasal cannula  O2 Flow (L/min): 4      PE:  Constitutional: NAD   HEENT: NC/AT, EOMI, PERRLA, conjunctivae clear; ears and nose atraumatic; pharynx benign  Neck: supple; thyroid not palpable  Back: no tenderness  Respiratory: decreased breath sounds  Cardiovascular: S1S2 regular, no murmurs  Abdomen: soft, not tender, not distended, positive BS; liver and spleen WNL  Genitourinary: no suprapubic tenderness  Lymphatic: no LN palpable  Musculoskeletal: no muscle tenderness, no joint swelling or tenderness  Extremities: no pedal edema  Neurological/ Psychiatric: AxOx3, Judgement and insight normal;  moving all extremities  Skin: no rashes; no palpable lesions; sternal wound    Labs: all available labs reviewed               07-15    137  |  98  |  18  ----------------------------<  106<H>  3.6   |  37<H>  |  0.64    Ca    9.6      15 Jul 2024 06:38        Urinalysis Basic - ( 13 Jul 2024 06:14 )    Color: x / Appearance: x / SG: x / pH: x  Gluc: 116 mg/dL / Ketone: x  / Bili: x / Urobili: x   Blood: x / Protein: x / Nitrite: x   Leuk Esterase: x / RBC: x / WBC x   Sq Epi: x / Non Sq Epi: x / Bacteria: x      Culture - Urine (07.12.24 @ 20:25)   Specimen Source: .Urine None  Culture Results:   <10,000 CFU/mL Normal Urogenital Cathie  Culture - Blood (07.12.24 @ 17:59)   Specimen Source: .Blood Blood-Peripheral  Culture Results:   No growth at 24 hours    Radiology: all available radiological tests reviewed  < from: CT Angio Chest PE Protocol w/ IV Cont (07.12.24 @ 21:40) >    ACC: [id number] EXAM:  CT ANGIO CHEST PULM ART [facility]   ORDERED BY: SUNITHA JARAMILLO     PROCEDURE DATE:  07/12/2024          INTERPRETATION:  CLINICAL INFORMATION: Chest pain and shortness of breath    COMPARISON: CT chest 6/12/2024    CONTRAST/COMPLICATIONS:  IV Contrast: Omnipaque 350  70 cc administered   0 cc discarded  Oral Contrast: NONE  Complications: None reported at time of study completion    PROCEDURE:  CT Angiography of the Chest.  Sagittal and coronal reformats were performed as well as 3D (MIP)   reconstructions.    FINDINGS:    LUNGS AND LARGE AIRWAYS: Patent central airways. Intralobular emphysema.   Subsegmental compressive atelectasis in the left lower lobe adjacent to   pleural fluid. Dependent right basilar and lingular atelectasis. No   pulmonary nodules.  PLEURA: Small to moderate left pleural effusion, new since prior study.  VESSELS: No pulmonary embolus. Aortic calcification. Status post CABG.  HEART: Cardiomegaly. Small pericardial effusion.  MEDIASTINUM AND SUZANNE: No lymphadenopathy.  CHEST WALL AND LOWER NECK: Within normal limits.  VISUALIZED UPPER ABDOMEN: Within normal limits.  BONES: Degenerative changes.    IMPRESSION:  No pulmonary embolus.    Small to moderate left pleural effusion.        Advanced directives addressed: full resuscitation

## 2024-07-16 NOTE — DISCHARGE NOTE PROVIDER - HOSPITAL COURSE
69F current smoker (2ppd x 50 years), current drinker (2 beers/day), with h/o HLD, macular degeneration, and pulmonary nodules (pulmonology follow up and CT chest @6mo recommended), who recently underwent CABG x4 (LIMA-LAD, SVG-Diagonal, SVG-OM, SVG-RCA) on 6/14/24 by Dr. Richards, was found to have wound dehiscence on 6/26, pt was send to ED, wound was debrided with placement of wound vac and was dicharged to rehab with 2 week course of augmentin on 6/28/24. Patient went to rehab at [facility], over the past 3 days the patient states that she has had an increased cough, has phlegm but feels too weak to expectorate and shortness of breath. She was started on upon arrival to [facility] on 3 L of nasal cannula was found to have a pneumonia by chest x-ray approximately 3 days ago started on levaquin and steroids. Yesterday patient was sent to ED for worsening hypoxia and soft BP. Patient denies any recent fevers, chills, sore throat, chest pain, palpitations. No other acute complaints and ROS otherwise benign.  In ED vitals stable. CBC noted for WBC 5.75, H/H 9.5/31.5, Plt 257. D-dimer 1046, CMP noted for CO2 35, BUN/Cr 11/0.59, Lactate wnl, Pro-BNP 1352. UA sterile. COVID/Flu negative, CTA neg for PE, noted for left sided pleural effusion. Patient recieved vanc/cefepime, and solumedrol in ED. Admitted for COPD exacerbation.  (13 Jul 2024 02:26)    7/15: pt seen today, feels well, sitting up in chair at bedside, no complaints to offer    Acute Hypoxic respiratory failure  COPD exacerbation  L pleural effusion  Possible superimposed pneumonia  Sternal wound  CABGX1  HFpEF - stable     -Continue IV antibiotics  -continue home meds   -on prednisone 40 mg qd, symbicort and spiriva, nebs   -on [place] oxygen, has home O2  -ID, Thoracic and Pulmonary consults appreciated   -as per Thoracic service:   -lasix daily       Lovenox for DVT ppx  Code status: Full code                 69F current smoker (2ppd x 50 years), current drinker (2 beers/day), with h/o HLD, macular degeneration, and pulmonary nodules (pulmonology follow up and CT chest @6mo recommended), who recently underwent CABG x4 (LIMA-LAD, SVG-Diagonal, SVG-OM, SVG-RCA) on 6/14/24 by Dr. Richards, was found to have wound dehiscence on 6/26, pt was send to ED, wound was debrided with placement of wound vac and was dicharged to rehab with 2 week course of augmentin on 6/28/24. Patient went to rehab at [facility], over the past 3 days the patient states that she has had an increased cough, has phlegm but feels too weak to expectorate and shortness of breath. She was started on upon arrival to [facility] on 3 L of nasal cannula was found to have a pneumonia by chest x-ray approximately 3 days ago started on levaquin and steroids. Yesterday patient was sent to ED for worsening hypoxia and soft BP. Patient denies any recent fevers, chills, sore throat, chest pain, palpitations. No other acute complaints and ROS otherwise benign.  In ED vitals stable. CBC noted for WBC 5.75, H/H 9.5/31.5, Plt 257. D-dimer 1046, CMP noted for CO2 35, BUN/Cr 11/0.59, Lactate wnl, Pro-BNP 1352. UA sterile. COVID/Flu negative, CTA neg for PE, noted for left sided pleural effusion. Patient recieved vanc/cefepime, and solumedrol in ED. Admitted for COPD exacerbation.  (13 Jul 2024 02:26)    7/15: pt seen today, feels well, sitting up in chair at bedside, no complaints to offer  7/16: pt seen today feels well, no overnight issues reported    Vital Signs Last 24 Hrs  T(C): 36.5 (16 Jul 2024 07:51), Max: 37 (15 Jul 2024 20:35)  T(F): 97.7 (16 Jul 2024 07:51), Max: 98.6 (15 Jul 2024 20:35)  HR: 87 (16 Jul 2024 11:17) (58 - 87)  BP: 131/62 (16 Jul 2024 11:17) (114/63 - 131/62)  BP(mean): 82 (15 Jul 2024 15:40) (82 - 82)  RR: 18 (16 Jul 2024 07:51) (18 - 18)  SpO2: 98% (16 Jul 2024 07:51) (91% - 98%)    Parameters below as of 16 Jul 2024 07:51  Patient On (Oxygen Delivery Method): nasal cannula  O2 Flow (L/min): 4    PHYSICAL EXAM:  GENERAL: NAD, lying in bed comfortably  HEAD:  Atraumatic, Normocephalic  EYES: conjunctiva and sclera clear  ENT: Moist mucous membranes  NECK: Supple, No JVD  CHEST/LUNG: Clear to auscultation bilaterally; No rales, rhonchi, wheezing. Unlabored respirations  HEART: Regular rate and rhythm; No murmurs  ABDOMEN: Bowel sounds present; Soft, Nontender, Nondistended.   EXTREMITIES:  2+ Peripheral Pulses, brisk capillary refill. No clubbing, cyanosis, or edema  NERVOUS SYSTEM:  Alert & Oriented X3, speech clear. No deficits   MSK: FROM all 4 extremities, full and equal strength  SKIN: wound to sternum, C/D, no oozing, no surrounding erythema, non tender, healing well, pink base      Acute Hypoxic respiratory failure  COPD exacerbation  L pleural effusion  Possible superimposed pneumonia  Sternal wound  CABGX1  HFpEF - stable     -Continue IV antibiotics  -continue home meds   -on prednisone 40 mg qd, symbicort and spiriva, nebs   -on [place] oxygen, has home O2  -ID, Thoracic and Pulmonary consults appreciated   -as per Thoracic service: ok to d/c, continue wet to dry dressing BID, f/u with Dr. Richards  -lasix daily       Lovenox for DVT ppx  Code status: Full code

## 2024-07-16 NOTE — DISCHARGE NOTE PROVIDER - CARE PROVIDERS DIRECT ADDRESSES
[email or web address]

## 2024-07-16 NOTE — PROGRESS NOTE ADULT - SUBJECTIVE AND OBJECTIVE BOX
Subjective:  Pt seen, eating lunch, on RA. Denies sternal pain    Vital Signs:  Vital Signs Last 24 Hrs  T(C): 36.5 (07-16-24 @ 07:51), Max: 37 (07-15-24 @ 20:35)  T(F): 97.7 (07-16-24 @ 07:51), Max: 98.6 (07-15-24 @ 20:35)  HR: 87 (07-16-24 @ 11:17) (58 - 87)  BP: 131/62 (07-16-24 @ 11:17) (114/63 - 131/62)  RR: 18 (07-16-24 @ 07:51) (18 - 18)  SpO2: 98% (07-16-24 @ 07:51) (91% - 98%) on (O2)    Telemetry/Alarms:    Relevant labs, radiology and Medications reviewed    07-15    137  |  98  |  18  ----------------------------<  106<H>  3.6   |  37<H>  |  0.64    Ca    9.6      15 Jul 2024 06:38        MEDICATIONS  (STANDING):  albuterol/ipratropium for Nebulization 3 milliLiter(s) Nebulizer every 6 hours  aspirin enteric coated 325 milliGRAM(s) Oral daily  atorvastatin 80 milliGRAM(s) Oral at bedtime  budesonide 160 MICROgram(s)/formoterol 4.5 MICROgram(s) Inhaler 2 Puff(s) Inhalation two times a day  cefuroxime   Tablet 500 milliGRAM(s) Oral every 12 hours  dextrose 5%. 1000 milliLiter(s) (50 mL/Hr) IV Continuous <Continuous>  dextrose 5%. 1000 milliLiter(s) (100 mL/Hr) IV Continuous <Continuous>  dextrose 50% Injectable 25 Gram(s) IV Push once  dextrose 50% Injectable 12.5 Gram(s) IV Push once  dextrose 50% Injectable 25 Gram(s) IV Push once  doxycycline monohydrate Capsule 100 milliGRAM(s) Oral every 12 hours  enoxaparin Injectable 40 milliGRAM(s) SubCutaneous every 24 hours  folic acid 1 milliGRAM(s) Oral daily  furosemide    Tablet 40 milliGRAM(s) Oral daily  glucagon  Injectable 1 milliGRAM(s) IntraMuscular once  insulin lispro (ADMELOG) corrective regimen sliding scale   SubCutaneous at bedtime  insulin lispro (ADMELOG) corrective regimen sliding scale   SubCutaneous three times a day before meals  metoprolol tartrate 25 milliGRAM(s) Oral two times a day  nicotine -   7 mG/24Hr(s) Patch 1 Patch Transdermal daily  nystatin Ointment 1 Application(s) Topical every 12 hours  potassium chloride    Tablet ER 10 milliEquivalent(s) Oral daily  predniSONE   Tablet 40 milliGRAM(s) Oral daily  thiamine 100 milliGRAM(s) Oral daily  tiotropium 2.5 MICROgram(s) Inhaler 2 Puff(s) Inhalation every 24 hours    MEDICATIONS  (PRN):  acetaminophen     Tablet .. 650 milliGRAM(s) Oral every 6 hours PRN Temp greater or equal to 38C (100.4F), Mild Pain (1 - 3)  aluminum hydroxide/magnesium hydroxide/simethicone Suspension 30 milliLiter(s) Oral every 4 hours PRN Dyspepsia  dextrose Oral Gel 15 Gram(s) Oral once PRN Blood Glucose LESS THAN 70 milliGRAM(s)/deciliter  melatonin 3 milliGRAM(s) Oral at bedtime PRN Insomnia  ondansetron Injectable 4 milliGRAM(s) IV Push every 8 hours PRN Nausea and/or Vomiting  oxyCODONE    IR 5 milliGRAM(s) Oral every 6 hours PRN Severe Pain (7 - 10)  senna 2 Tablet(s) Oral at bedtime PRN for constipation      Physical exam  Gen NAD  Neuro AAOx3  Card RRR  chest- sternal wound ~3cm deep, some minimal exudate at base, no foul smell, no surrounding erythema, pink wound bed  Pulm equal expansioin  Abd soft  Ext warm         I&O's Summary      Assessment  69y Female  w/ PAST MEDICAL & SURGICAL HISTORY:  Macular degeneration      HLD (hyperlipidemia)      Current smoker      Jaw fracture      Status post coronary artery bypass grafting      admitted with complaints of Patient is a 69y old  Female who presents with a chief complaint of sob (15 Jul 2024 09:12)  .  69F current smoker (2ppd x 50 years), current drinker (2 beers/day), with h/o HLD, macular degeneration, and pulmonary nodules (pulmonology follow up and CT chest @6mo recommended), who recently underwent CABG x4 (LIMA-LAD, SVG-Diagonal, SVG-OM, SVG-RCA) on 6/14/24 by Dr. Richards, was found to have wound dehiscence on 6/26wound was debrided with placement of wound vac and was dicharged to rehab with 2 week course of augmentin on 6/28/24, Admitted to  for COPD exacerbation and PNA    Dr. Leon d/w Dr. Richards, continue wet to dry dressings w NS to sternal wound, no wound vac for now  f/u Dr. Richards in office for evaluation of wound  no acute intervention  care as per medical teams    Discussed with Cardiothoracic Team at AM rounds.

## 2024-07-16 NOTE — DISCHARGE NOTE NURSING/CASE MANAGEMENT/SOCIAL WORK - PATIENT PORTAL LINK FT
You can access the FollowMyHealth Patient Portal offered by [facility] by registering at the following website: [email or web address]. By joining [employer]’s FollowMyHealth portal, you will also be able to view your health information using other applications (apps) compatible with our system.

## 2024-07-16 NOTE — DISCHARGE NOTE PROVIDER - NSDCCPCAREPLAN_GEN_ALL_CORE_FT
PRINCIPAL DISCHARGE DIAGNOSIS  Diagnosis: Acute respiratory failure with hypoxia  Assessment and Plan of Treatment: Admitted for low oxygen levels, likely due to a combination of COPD, left pleural effusion with suspected superimposed bacterial pneumonia. Treated with antibiotics with improvement. Seen and evaluated by cardiothoracic surgery, no recommendations for drainage at this time. Continue with ceftin for 5 more days.      SECONDARY DISCHARGE DIAGNOSES  Diagnosis: Pleural effusion, left  Assessment and Plan of Treatment:     Diagnosis: COPD exacerbation  Assessment and Plan of Treatment: Resolved    Diagnosis: Open wound  Assessment and Plan of Treatment: open wound to chest from recent heart surgery,     PRINCIPAL DISCHARGE DIAGNOSIS  Diagnosis: Acute respiratory failure with hypoxia  Assessment and Plan of Treatment: Admitted for low oxygen levels, likely due to a combination of COPD, left pleural effusion with suspected superimposed bacterial pneumonia. Treated with antibiotics with improvement. Seen and evaluated by cardiothoracic surgery, no recommendations for drainage at this time. Continue with ceftin and doxycycline.      SECONDARY DISCHARGE DIAGNOSES  Diagnosis: Pleural effusion, left  Assessment and Plan of Treatment:     Diagnosis: COPD exacerbation  Assessment and Plan of Treatment: Resolved    Diagnosis: Open wound  Assessment and Plan of Treatment: open wound to chest from recent heart surgery,     PRINCIPAL DISCHARGE DIAGNOSIS  Diagnosis: Acute respiratory failure with hypoxia  Assessment and Plan of Treatment: Admitted for low oxygen levels, likely due to a combination of COPD, left pleural effusion with suspected superimposed bacterial pneumonia. Treated with antibiotics with improvement. Seen and evaluated by cardiothoracic surgery, no recommendations for drainage at this time. Continue with ceftin and doxycycline.      SECONDARY DISCHARGE DIAGNOSES  Diagnosis: Pleural effusion, left  Assessment and Plan of Treatment:     Diagnosis: COPD exacerbation  Assessment and Plan of Treatment: continue with prednisone for 1 more day    Diagnosis: Open wound  Assessment and Plan of Treatment: open wound to chest from recent heart surgery, continue wet to dry dressing with normal saline twice a day. Follow up with Dr. Richards in 1 week.

## 2024-07-16 NOTE — PROGRESS NOTE ADULT - PROVIDER SPECIALTY LIST ADULT
Infectious Disease
Pulmonology
Hospitalist
Infectious Disease
Infectious Disease
Thoracic Surgery
Pulmonology
Pulmonology

## 2024-07-16 NOTE — DISCHARGE NOTE PROVIDER - NSDCFUSCHEDAPPT_GEN_ALL_CORE_FT
Gerard Mead  [facility]  GASTRO [address]  Scheduled Appointment: 07/25/2024    Chaitanya Richards  [facility]  CTSURG [address]  Scheduled Appointment: 07/30/2024

## 2024-07-16 NOTE — PROGRESS NOTE ADULT - ASSESSMENT
69F current smoker (2ppd x 50 years), current drinker (2 beers/day), with h/o HLD, macular degeneration, and pulmonary nodules (pulmonology follow up and CT chest @6mo recommended), who recently underwent CABG x4 (LIMA-LAD, SVG-Diagonal, SVG-OM, SVG-RCA) on 6/14/24 by Dr. Richards, was found to have wound dehiscence on 6/26, pt was send to ED, wound was debrided with placement of wound vac and was discharged to rehab with 2 week course of augmentin on 6/28/24. Patient went to rehab at [facility], over the past 3 days the patient states that she has had an increased cough, has phlegm but feels too weak to expectorate and shortness of breath. She was started on upon arrival to [facility] on 3 L of nasal cannula was found to have a pneumonia by chest x-ray approximately 3 days ago started on levaquin and steroids.  patient was sent to ED for worsening hypoxia and soft BP. In ED vitals stable. CBC noted for WBC 5.75, H/H 9.5/31.5, Plt 257. D-dimer 1046, CMP noted for CO2 35, BUN/Cr 11/0.59, Lactate wnl, Pro-BNP 1352. UA sterile. COVID/Flu negative, CTA neg for PE, noted for left sided pleural effusion. Patient recieved vanc/cefepime, and solumedrol in ED. Admitted for COPD exacerbation.     1. Acute respiratory failure. COPD exacerbation. L pleural effusion. Possible superimposed pneumonia. Sternal wound   - imaging reviewed  - on IV cefepime 1gmq8h #2  - on doxycycline 100mg BID  #2  - continue with antibiotic coverage   - on steroids  - pulmonary eval noted  - f/u cultures check sputum cx   - tolerating abx well so far; no side effects noted  - reason for abx use and side effects reviewed with patient  - sternal wound - wound care f/u, surgery f.u noted   - supportive care  - fu cbc    Clinical team may change from intravenous to oral antibiotics when the following criteria are met:   1. Patient is clinically improving/stable       a)	Improved signs and symptoms of infection from initial presentation       b)	Afebrile for 24 hours       c)	Leukocytosis trending towards normal range   2. Patient is tolerating oral intake   3. Initial/repeat blood cultures are negative     Cannot advise changing to oral antibiotic therapy until culture sensitivity is available.  
69F current smoker (2ppd x 50 years), current drinker (2 beers/day), with h/o HLD, macular degeneration, and pulmonary nodules (pulmonology follow up and CT chest @6mo recommended), who recently underwent CABG x4 (LIMA-LAD, SVG-Diagonal, SVG-OM, SVG-RCA) on 6/14/24 by Dr. Richards, was found to have wound dehiscence on 6/26, pt was send to ED, wound was debrided with placement of wound vac and was discharged to rehab with 2 week course of augmentin on 6/28/24. Patient went to rehab at [facility], over the past 3 days the patient states that she has had an increased cough, has phlegm but feels too weak to expectorate and shortness of breath. She was started on upon arrival to [facility] on 3 L of nasal cannula was found to have a pneumonia by chest x-ray approximately 3 days ago started on levaquin and steroids.  patient was sent to ED for worsening hypoxia and soft BP. In ED vitals stable. CBC noted for WBC 5.75, H/H 9.5/31.5, Plt 257. D-dimer 1046, CMP noted for CO2 35, BUN/Cr 11/0.59, Lactate wnl, Pro-BNP 1352. UA sterile. COVID/Flu negative, CTA neg for PE, noted for left sided pleural effusion. Patient recieved vanc/cefepime, and solumedrol in ED. Admitted for COPD exacerbation.     1. Acute respiratory failure. COPD exacerbation. L pleural effusion. Possible superimposed pneumonia. Sternal wound   - imaging reviewed  - on IV cefepime 1gmq8h #3  - on doxycycline 100mg BID  #3   - continue with antibiotic coverage   - on steroids  - pulmonary eval noted  - f/u cultures check sputum cx   - tolerating abx well so far; no side effects noted  - reason for abx use and side effects reviewed with patient  - sternal wound - wound care f/u, surgery f.u noted   - supportive care  - fu cbc    Clinical team may change from intravenous to oral antibiotics when the following criteria are met:   1. Patient is clinically improving/stable       a)	Improved signs and symptoms of infection from initial presentation       b)	Afebrile for 24 hours       c)	Leukocytosis trending towards normal range   2. Patient is tolerating oral intake   3. Initial/repeat blood cultures are negative       - when above met change to po ceftin x 7 day course, po doxy x 5 days  
PROBLEMS:    Acute Hypoxic respiratory failure  AC COPD exacerbation  L pleural effusion-may be post-cardiotomy  Possible superimposed pneumonia  Sternal wound  CABGX1  HFpEF  DM    PLAN:    pulmonary better-PT  IV cefepime/Vanco   On prednisone 40 mg qd  Symbicort and Spiriva   Titrate NC oxygen  Follow wound care consult  Lasix daily   CT surgery eval may benefit from thoro  DM-on ISS-follow Dxt  Lovenox for DVT ppx  Code status-Full code       
69F current smoker (2ppd x 50 years), current drinker (2 beers/day), with h/o HLD, macular degeneration, and pulmonary nodules (pulmonology follow up and CT chest @6mo recommended), who recently underwent CABG x4 (LIMA-LAD, SVG-Diagonal, SVG-OM, SVG-RCA) on 6/14/24 by Dr. Richards, was found to have wound dehiscence on 6/26, pt was send to ED, wound was debrided with placement of wound vac and was discharged to rehab with 2 week course of augmentin on 6/28/24. Patient went to rehab at [facility], over the past 3 days the patient states that she has had an increased cough, has phlegm but feels too weak to expectorate and shortness of breath. She was started on upon arrival to [facility] on 3 L of nasal cannula was found to have a pneumonia by chest x-ray approximately 3 days ago started on levaquin and steroids.  patient was sent to ED for worsening hypoxia and soft BP. In ED vitals stable. CBC noted for WBC 5.75, H/H 9.5/31.5, Plt 257. D-dimer 1046, CMP noted for CO2 35, BUN/Cr 11/0.59, Lactate wnl, Pro-BNP 1352. UA sterile. COVID/Flu negative, CTA neg for PE, noted for left sided pleural effusion. Patient recieved vanc/cefepime, and solumedrol in ED. Admitted for COPD exacerbation.     1. Acute respiratory failure. COPD exacerbation. L pleural effusion. Possible superimposed pneumonia. Sternal wound   - imaging reviewed  - on IV cefepime 1gmq8h #4  - on doxycycline 100mg BID  #4  - continue with antibiotic coverage   - on steroids  - pulmonary eval noted  - f/u cultures check sputum cx   - tolerating abx well so far; no side effects noted  - reason for abx use and side effects reviewed with patient  - sternal wound - wound care f/u, surgery f.u noted   - supportive care  - fu cbc    Clinical team may change from intravenous to oral antibiotics when the following criteria are met:   1. Patient is clinically improving/stable       a)	Improved signs and symptoms of infection from initial presentation       b)	Afebrile for 24 hours       c)	Leukocytosis trending towards normal range   2. Patient is tolerating oral intake   3. Initial/repeat blood cultures are negative       - when above met change to po ceftin x 7 day course, po doxy x 5 days  
A/P:    1.  Acute Hypoxic respiratory failure  COPD exacerbation  L pleural effusion  Possible superimposed pneumonia  Sternal wound  CABGX1  HFpEF  -IV antibiotics  -on prednisone 40 mg qd, symbicort and spiriva   -on NC oxygen  -follow ID, Thoracic and Pulmonary consults   -as per Thoracic service: HH did not have proper vac supplies and Patient was then with current sponge/foam 4 days old, removed vac and placed wet to dry dressing with BID changes  -lasix daily     2.  Lovenox for DVT ppx    3.  DM  -on ISS  -follow Dxt    4.  Code status  -Full code 
A/P:    1.  Acute Hypoxic respiratory failure  COPD exacerbation  L pleural effusion  Possible superimposed pneumonia  Sternal wound  CABGX1  HFpEF  -IV antibiotics  -on prednisone 40 mg qd, symbicort and spiriva   -on NC oxygen  -follow ID, Thoracic and Pulmonary consults   -follow wound care consult  -lasix daily     2.  Lovenox for DVT ppx    3.  DM  -on ISS  -follow Dxt    4.  Code status  -Full code 
PROBLEMS:    Acute Hypoxic respiratory failure  AC COPD exacerbation  L pleural effusion-may be post-cardiotomy  Possible superimposed pneumonia  Sternal wound  CABGX1  HFpEF  DM    PLAN:    pulmonary better-PT-decd planning  IV cefepime/Vanco   On prednisone 40 mg qd  Symbicort and Spiriva   Titrate NC oxygen  Follow wound care consult  Lasix daily   CT surgery eval may benefit from thoro  DM-on ISS-follow Dxt  Lovenox for DVT ppx  Code status-Full code     
Acute Hypoxic respiratory failure  COPD exacerbation  L pleural effusion  Possible superimposed pneumonia  Sternal wound  CABGX1  HFpEF - stable     -Continue IV antibiotics  -continue home meds   -on prednisone 40 mg qd, symbicort and spiriva, nebs   -on NC oxygen, has home O2  -ID, Thoracic and Pulmonary consults appreciated   -as per Thoracic service: HH did not have proper vac supplies and Patient was then with current sponge/foam 4 days old, removed vac and placed wet to dry dressing with BID changes  -lasix daily       Lovenox for DVT ppx  Code status: Full code       DISPO: d/c planning to [facility] for tomorrow   
PROBLEMS:    Acute Hypoxic respiratory failure  AC COPD exacerbation  L pleural effusion  Possible superimposed pneumonia  Sternal wound  CABGX1  HFpEF  DM    PLAN:    pulmonary better-PT  IV cefepime/Vanco   On prednisone 40 mg qd  Symbicort and Spiriva   Titrate NC oxygen  Follow wound care consult  Lasix daily   CT surgery eval may benefit from thoro  DM-on ISS-follow Dxt  Lovenox for DVT ppx  Code status-Full code

## 2024-07-23 ENCOUNTER — APPOINTMENT (OUTPATIENT)
Dept: PULMONOLOGY | Facility: CLINIC | Age: 69
End: 2024-07-23

## 2024-07-23 DIAGNOSIS — Z95.1 PRESENCE OF AORTOCORONARY BYPASS GRAFT: ICD-10-CM

## 2024-07-23 DIAGNOSIS — E78.5 HYPERLIPIDEMIA, UNSPECIFIED: ICD-10-CM

## 2024-07-23 DIAGNOSIS — F17.210 NICOTINE DEPENDENCE, CIGARETTES, UNCOMPLICATED: ICD-10-CM

## 2024-07-23 DIAGNOSIS — J15.9 UNSPECIFIED BACTERIAL PNEUMONIA: ICD-10-CM

## 2024-07-23 DIAGNOSIS — Z79.82 LONG TERM (CURRENT) USE OF ASPIRIN: ICD-10-CM

## 2024-07-23 DIAGNOSIS — T81.32XA DISRUPTION OF INTERNAL OPERATION (SURGICAL) WOUND, NOT ELSEWHERE CLASSIFIED, INITIAL ENCOUNTER: ICD-10-CM

## 2024-07-23 DIAGNOSIS — R91.1 SOLITARY PULMONARY NODULE: ICD-10-CM

## 2024-07-23 DIAGNOSIS — H35.30 UNSPECIFIED MACULAR DEGENERATION: ICD-10-CM

## 2024-07-23 DIAGNOSIS — J90 PLEURAL EFFUSION, NOT ELSEWHERE CLASSIFIED: ICD-10-CM
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
## 2024-07-23 DIAGNOSIS — I50.32 CHRONIC DIASTOLIC (CONGESTIVE) HEART FAILURE: ICD-10-CM

## 2024-07-23 DIAGNOSIS — J95.89 OTHER POSTPROCEDURAL COMPLICATIONS AND DISORDERS OF RESPIRATORY SYSTEM, NOT ELSEWHERE CLASSIFIED: ICD-10-CM

## 2024-07-23 DIAGNOSIS — J96.01 ACUTE RESPIRATORY FAILURE WITH HYPOXIA: ICD-10-CM
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
## 2024-07-23 DIAGNOSIS — J44.1 CHRONIC OBSTRUCTIVE PULMONARY DISEASE WITH (ACUTE) EXACERBATION: ICD-10-CM

## 2024-07-23 DIAGNOSIS — A41.9 SEPSIS, UNSPECIFIED ORGANISM: ICD-10-CM

## 2024-07-25 ENCOUNTER — APPOINTMENT (OUTPATIENT)
Dept: GASTROENTEROLOGY | Facility: CLINIC | Age: 69
End: 2024-07-25

## 2024-07-30 ENCOUNTER — TRANSCRIPTION ENCOUNTER (OUTPATIENT)
Age: 69
End: 2024-07-30

## 2024-07-30 ENCOUNTER — APPOINTMENT (OUTPATIENT)
Dept: CARDIOTHORACIC SURGERY | Facility: CLINIC | Age: 69
End: 2024-07-30
Payer: MEDICARE

## 2024-07-30 PROCEDURE — 99024 POSTOP FOLLOW-UP VISIT: CPT

## 2024-08-01 NOTE — REASON FOR VISIT
[de-identified] : CABG x 4 (LIMA_LAD, SVG-OM, SVG-Diag, SVG-PDA)  [de-identified] : 6/14/24 [de-identified] : 69F with h/o HLD, macular degeneration (has injections q3-4 months, next due July), current smoker (2ppd, 50 years), current drinker (2 beers/day), who presented to [facility] for evaluation after abnormal nuclear stress test, found to have MVD. Transferred to [facility] for CABGx4 on 6/14 by Dr. Richards. Postoperative course complicated by hypoxia, d/c'd on home o2 on POD 5.  Patient then presented in office 6/26/2024 for scheduled postoperative follow up. In office, midsternal incision staples were removed, subsequently found to have distal MSI wound dehiscence at lower pole, for which she was sent to ED. In the ED, patient admits sternal incision pain, and right thigh burning. Patient denies any smoking or alcohol use, and admits to missing approximately 2 showers since discharge and reports compliance with wearing surgical bra postop. At the bedside, wound was debrided by Dr. Richards and woundvac was placed. Blood cultures were sent. ID was consulted. Empiric IV antibiotics were started (Vanco, Zosyn). Per Dr. Richards, patient was stable for discharge with woundvac and Augmentin PO x2 weeks. Patient was accepted to [facility].

## 2024-08-01 NOTE — REASON FOR VISIT
[de-identified] : CABG x 4 (LIMA_LAD, SVG-OM, SVG-Diag, SVG-PDA)  [de-identified] : 6/14/24 [de-identified] : 69F with h/o HLD, macular degeneration (has injections q3-4 months, next due July), current smoker (2ppd, 50 years), current drinker (2 beers/day), who presented to [facility] for evaluation after abnormal nuclear stress test, found to have MVD. Transferred to [facility] for CABGx4 on 6/14 by Dr. Richards. Postoperative course complicated by hypoxia, d/c'd on home o2 on POD 5.  Patient then presented in office 6/26/2024 for scheduled postoperative follow up. In office, midsternal incision staples were removed, subsequently found to have distal MSI wound dehiscence at lower pole, for which she was sent to ED. In the ED, patient admits sternal incision pain, and right thigh burning. Patient denies any smoking or alcohol use, and admits to missing approximately 2 showers since discharge and reports compliance with wearing surgical bra postop. At the bedside, wound was debrided by Dr. Richards and woundvac was placed. Blood cultures were sent. ID was consulted. Empiric IV antibiotics were started (Vanco, Zosyn). Per Dr. Richards, patient was stable for discharge with woundvac and Augmentin PO x2 weeks. Patient was accepted to [facility].

## 2024-08-01 NOTE — ASSESSMENT
[FreeTextEntry1] : Ms Truong presents from [facility] for wound evaluation. There is a 1 cm L x 1 cm depth that is clean and dry. There is no evidence of infectious process. Wound vac is no longer in place. We discussed that home wound care should be performed with wet to dry dressing daily with daily showers. She will require daily showers as she is only allowed to shower every other week at rehab. She is ambulating independently and requesting to be discharged from the facility.   PLAN: - Arrange for homecare dressing changes and woundcare - Return to care in 2 weeks for wound evaluation       I, Dr. Richards personally performed the evaluation and management (E/M) services for this patient. That E/M includes conducting the initial examination, assessing all conditions, and establishing the plan of care. Today, Andreas Martinez NP was here to observe my evaluation and management services for this patient.

## 2024-08-01 NOTE — ASSESSMENT
[FreeTextEntry1] : Ms Truong presents from [facility] for wound evaluation. There is a 1 cm L x 1 cm depth that is clean and dry. There is no evidence of infectious process. Wound vac is no longer in place. We discussed that home wound care should be performed with wet to dry dressing daily with daily showers. She will require daily showers as she is only allowed to shower every other week at rehab. She is ambulating independently and requesting to be discharged from the facility.   PLAN: - Arrange for homecare dressing changes and woundcare - Return to care in 2 weeks for wound evaluation       I, Dr. Richards personally performed the evaluation and management (E/M) services for this patient. That E/M includes conducting the initial examination, assessing all conditions, and establishing the plan of care. Today, Andreas Martinez NP was here to observe my evaluation and management services for this patient.

## 2024-08-13 ENCOUNTER — APPOINTMENT (OUTPATIENT)
Dept: CARDIOTHORACIC SURGERY | Facility: CLINIC | Age: 69
End: 2024-08-13
Payer: MEDICARE

## 2024-08-13 DIAGNOSIS — Z95.1 PRESENCE OF AORTOCORONARY BYPASS GRAFT: ICD-10-CM

## 2024-08-13 DIAGNOSIS — T14.8XXD OTHER INJURY OF UNSPECIFIED BODY REGION, SUBSEQUENT ENCOUNTER: ICD-10-CM

## 2024-08-13 PROCEDURE — 99024 POSTOP FOLLOW-UP VISIT: CPT

## 2024-08-13 NOTE — REASON FOR VISIT
[de-identified] : CABG x 4 (LIMA to LAD, SVG to OM, SVG to Diag, SVG to PDA) [de-identified] : DANELLE JENKINS is status post CABG x 4 (LIMA_LAD, SVG-OM, SVG-Diag, SVG-PDA) and she is here for a post-op visit and wound check.   Surgery Date: 6/14/24 69F with h/o HLD, macular degeneration (has injections q3-4 months, next due July), current smoker (2ppd, 50 years), current drinker (2 beers/day), who presented to [facility] for evaluation after abnormal nuclear stress test, found to have MVD. Transferred to [facility] for CABGx4 on 6/14 by Dr. Richards. Postoperative course complicated by hypoxia, d/c'd on home o2 on Post op Day 5.  Patient then presented in office 6/26/2024 for scheduled postoperative follow up. In office, midsternal incision staples were removed, subsequently found to have distal MSI wound dehiscence at lower pole, for which she was sent to ED. In the ED, patient admits sternal incision pain, and right thigh burning. Patient denies any smoking or alcohol use, and admits to missing approximately 2 showers since discharge and reports compliance with wearing surgical bra postop. At the bedside, wound was debrided by Dr. Richards and woundvac was placed. Blood cultures were sent. ID was consulted. Empiric IV antibiotics were started (Vanco, Zosyn). Per Dr. Rcihards, patient was stable for discharge with woundvac and Augmentin PO x2 weeks. Patient was accepted to [facility].  She presents today for wound evaluation.

## 2024-08-13 NOTE — REASON FOR VISIT
[de-identified] : CABG x 4 (LIMA to LAD, SVG to OM, SVG to Diag, SVG to PDA) [de-identified] : DANELLE JENKINS is status post CABG x 4 (LIMA_LAD, SVG-OM, SVG-Diag, SVG-PDA) and she is here for a post-op visit and wound check.   Surgery Date: 6/14/24 69F with h/o HLD, macular degeneration (has injections q3-4 months, next due July), current smoker (2ppd, 50 years), current drinker (2 beers/day), who presented to [facility] for evaluation after abnormal nuclear stress test, found to have MVD. Transferred to [facility] for CABGx4 on 6/14 by Dr. Richards. Postoperative course complicated by hypoxia, d/c'd on home o2 on Post op Day 5.  Patient then presented in office 6/26/2024 for scheduled postoperative follow up. In office, midsternal incision staples were removed, subsequently found to have distal MSI wound dehiscence at lower pole, for which she was sent to ED. In the ED, patient admits sternal incision pain, and right thigh burning. Patient denies any smoking or alcohol use, and admits to missing approximately 2 showers since discharge and reports compliance with wearing surgical bra postop. At the bedside, wound was debrided by Dr. Richards and woundvac was placed. Blood cultures were sent. ID was consulted. Empiric IV antibiotics were started (Vanco, Zosyn). Per Dr. Richards, patient was stable for discharge with woundvac and Augmentin PO x2 weeks. Patient was accepted to [facility].  She presents today for wound evaluation.

## 2024-08-13 NOTE — ASSESSMENT
[FreeTextEntry1] : Ms Truong is overall improving post operatively. Her sternal wound remains open and her sister is assisting with wet to dry packing. The wound measures 0.5cm deep with 1 cm round opening. There is no obvious sign of infection and she is showering daily. She will continue dressing changes as is and return to care for evaluation in 3 weeks. She is agreeable and will follow up accordingly.  PLAN: - Continue wound care  - Return to care in 3 weeks

## 2024-08-27 NOTE — REASON FOR VISIT
[de-identified] : CABG x 4 (LIMA to LAD, SVG to OM, SVG to Diag, SVG to PDA)  [de-identified] : 6/14/24 [de-identified] : 69F with h/o HLD, macular degeneration (has injections q3-4 months, next due July), current smoker (2ppd, 50 years), current drinker (2 beers/day), who presented to [facility] for evaluation after abnormal nuclear stress test, found to have MVD. Transferred to [facility] for CABGx4 on 6/14 by Dr. Richards. Postoperative course complicated by hypoxia, d/c'd on home o2 on Post op Day 5.  Patient then presented in office 6/26/2024 for scheduled postoperative follow up. In office, midsternal incision staples were removed, subsequently found to have distal MSI wound dehiscence at lower pole, for which she was sent to ED. In the ED, patient admits sternal incision pain, and right thigh burning. Patient denies any smoking or alcohol use, and admits to missing approximately 2 showers since discharge and reports compliance with wearing surgical bra postop. At the bedside, wound was debrided by Dr. Richards and woundvac was placed. Blood cultures were sent. ID was consulted. Empiric IV antibiotics were started (Vanco, Zosyn). Per Dr. Richards, patient was stable for discharge with woundvac and Augmentin PO x2 weeks. Patient was accepted to [facility].  She presents today for wound evaluation.

## 2024-09-04 ENCOUNTER — NON-APPOINTMENT (OUTPATIENT)
Age: 69
End: 2024-09-04

## 2024-09-04 ENCOUNTER — APPOINTMENT (OUTPATIENT)
Dept: CARDIOTHORACIC SURGERY | Facility: CLINIC | Age: 69
End: 2024-09-04
Payer: MEDICARE

## 2024-09-04 DIAGNOSIS — Z95.1 PRESENCE OF AORTOCORONARY BYPASS GRAFT: ICD-10-CM

## 2024-09-04 DIAGNOSIS — T14.8XXD OTHER INJURY OF UNSPECIFIED BODY REGION, SUBSEQUENT ENCOUNTER: ICD-10-CM

## 2024-09-04 DIAGNOSIS — Z00.00 ENCOUNTER FOR GENERAL ADULT MEDICAL EXAMINATION W/OUT ABNORMAL FINDINGS: ICD-10-CM

## 2024-09-04 PROCEDURE — 99024 POSTOP FOLLOW-UP VISIT: CPT

## 2024-09-04 NOTE — REASON FOR VISIT
[de-identified] : CABG x 4 (LIMA to LAD, SVG to OM, SVG to Diag, SVG to PDA)  [de-identified] : 6/14/24 [de-identified] : 69F with h/o HLD, macular degeneration (has injections q3-4 months, next due July), current smoker (2ppd, 50 years), current drinker (2 beers/day), who presented to [facility] for evaluation after abnormal nuclear stress test, found to have MVD. Transferred to [facility] for CABGx4 on 6/14 by Dr. Richards. Postoperative course complicated by hypoxia, d/c'd on home o2 on Post op Day 5.  Patient then presented in office 6/26/2024 for scheduled postoperative follow up. In office, midsternal incision staples were removed, subsequently found to have distal MSI wound dehiscence at lower pole, for which she was sent to ED. In the ED, patient admits sternal incision pain, and right thigh burning. Patient denies any smoking or alcohol use, and admits to missing approximately 2 showers since discharge and reports compliance with wearing surgical bra postop. At the bedside, wound was debrided by Dr. Richards and woundvac was placed. Blood cultures were sent. ID was consulted. Empiric IV antibiotics were started (Vanco, Zosyn). Per Dr. Richards, patient was stable for discharge with woundvac and Augmentin PO x2 weeks. Patient was accepted to [facility].  She presents today for wound evaluation.

## 2024-09-04 NOTE — PHYSICAL EXAM
[Clean] : clean [Dry] : dry [Healing Well] : healing well [FreeTextEntry5] : L 0.5 cm x W 0.5 cm x D 0.5 cm right leg SVG site  [No Edema] : no edema

## 2024-09-04 NOTE — REASON FOR VISIT
[de-identified] : CABG x 4 (LIMA to LAD, SVG to OM, SVG to Diag, SVG to PDA)  [de-identified] : 6/14/24 [de-identified] : 69F with h/o HLD, macular degeneration (has injections q3-4 months, next due July), current smoker (2ppd, 50 years), current drinker (2 beers/day), who presented to [facility] for evaluation after abnormal nuclear stress test, found to have MVD. Transferred to [facility] for CABGx4 on 6/14 by Dr. Richards. Postoperative course complicated by hypoxia, d/c'd on home o2 on Post op Day 5.  Patient then presented in office 6/26/2024 for scheduled postoperative follow up. In office, midsternal incision staples were removed, subsequently found to have distal MSI wound dehiscence at lower pole, for which she was sent to ED. In the ED, patient admits sternal incision pain, and right thigh burning. Patient denies any smoking or alcohol use, and admits to missing approximately 2 showers since discharge and reports compliance with wearing surgical bra postop. At the bedside, wound was debrided by Dr. Richards and woundvac was placed. Blood cultures were sent. ID was consulted. Empiric IV antibiotics were started (Vanco, Zosyn). Per Dr. Richards, patient was stable for discharge with woundvac and Augmentin PO x2 weeks. Patient was accepted to [facility].  She presents today for wound evaluation.

## 2024-09-04 NOTE — ASSESSMENT
[FreeTextEntry1] : Ms Beaulieu is overall progressing well. She is showering daily and is wearing a bra as instructed. She remains living with her sister. I am happy with her progress and she may return to care in 2 weeks via telehealth for review and touch base for leg wound. She is agreeable and will follow up accordingly.  PLAN: - Continue daily showers - Continue bra - Return to care in 2 weeks for Telehealth

## 2024-09-04 NOTE — ASSESSMENT
[FreeTextEntry1] : Ms Beualieu is overall progressing well. She is showering daily and is wearing a bra as instructed. She remains living with her sister. I am happy with her progress and she may return to care in 2 weeks via telehealth for review and touch base for leg wound. She is agreeable and will follow up accordingly.  PLAN: - Continue daily showers - Continue bra - Return to care in 2 weeks for Telehealth

## 2024-09-18 ENCOUNTER — APPOINTMENT (OUTPATIENT)
Dept: CARDIOTHORACIC SURGERY | Facility: CLINIC | Age: 69
End: 2024-09-18

## (undated) DEVICE — SUT VICRYL 0 36" CTX UNDYED

## (undated) DEVICE — WOUND IRR IRRISEPT W 0.5 CHG

## (undated) DEVICE — SUT PROLENE 7-0 24" BV175-6

## (undated) DEVICE — STEALTH CLAMP INSERT FIBRA/FIBRA 60MM

## (undated) DEVICE — SUT MONOCRYL 4-0 27" PS-2 UNDYED

## (undated) DEVICE — SUT DOUBLE 6 WIRE STERNAL

## (undated) DEVICE — STOPCOCK 3 WAY W SWIVEL MALE LUER LOCK

## (undated) DEVICE — TUBING INSUFFLATION LAP FILTER 10FT

## (undated) DEVICE — SUT ETHIBOND 2-0 36" SH

## (undated) DEVICE — VASOVIEW HEMOPRO 2

## (undated) DEVICE — SUT SILK 5-0 60" TIES

## (undated) DEVICE — TONGUE DEPRESSOR

## (undated) DEVICE — SUT ETHIBOND 4-0 36" RB-1

## (undated) DEVICE — CHEST DRAIN PLEUR-EVAC DRY/WET ADULT-PEDS SINGLE (QUICK)

## (undated) DEVICE — SOL INJ NS 0.9% 1000ML

## (undated) DEVICE — PREP CHLORAPREP HI-LITE ORANGE 26ML

## (undated) DEVICE — AORTIC PUNCH 5MM STANDARD HANDLE

## (undated) DEVICE — DRAIN SILICONE MEDIASTINAL 9MM STRL

## (undated) DEVICE — SUT ETHIBOND 1 30" OS6

## (undated) DEVICE — SUT PROLENE 4-0 36" RB-1

## (undated) DEVICE — SOL ANTI FOG

## (undated) DEVICE — CONNECTOR STRAIGHT 3/8 X 1/2"

## (undated) DEVICE — DRSG MEPILEX 10 X 30CM (4 X 12") WHITE

## (undated) DEVICE — SUT PDS II 2-0 27" CT-1

## (undated) DEVICE — SUT VICRYL 2-0 27" CT-1 UNDYED

## (undated) DEVICE — GOWN TRIMAX LG

## (undated) DEVICE — BEAVER BLADE MINI SHARP ALL ROUND (BLUE)

## (undated) DEVICE — SUT SILK 0 30" TIES

## (undated) DEVICE — PREP SCRUB BRUSH W CHG 4%

## (undated) DEVICE — MAXI-FLO SUCTION CATHETER KIT 14FR STRAIGHT

## (undated) DEVICE — BULLDOG SPRING CLIP 6MM SOFT/SOFT

## (undated) DEVICE — BLOWER MISTER VIPER II

## (undated) DEVICE — SUT ETHIBOND 2-0 4-30" RB-1 WHITE

## (undated) DEVICE — SUT SILK 0 30" SH

## (undated) DEVICE — DRAPE SLUSH / WARMER 44 X 66"

## (undated) DEVICE — NDL COUNTER FOAM AND MAGNET 40-70

## (undated) DEVICE — VISITEC 4X4

## (undated) DEVICE — DRSG MEPILEX 10 X 25CM (4 X 10") POST OP AG SILVER

## (undated) DEVICE — SUT PROLENE 7-0 24" BV-1

## (undated) DEVICE — DRAPE IOBAN 33" X 23"

## (undated) DEVICE — WARMING BLANKET DUO-THERM HYPER/HYPOTHERM ADULT

## (undated) DEVICE — SUT ETHIBOND 3-0 36" RB-1

## (undated) DEVICE — SUT PROLENE 3-0 36" SH

## (undated) DEVICE — ELCTR BOVIE BLADE 3/4" EXTENDED LENGTH 6"

## (undated) DEVICE — SYR LUER LOK 20CC

## (undated) DEVICE — SYS VEIN HARVESTING VIRTUOSAPH PLUS W/ RADIAL

## (undated) DEVICE — DRSG TEGADERM 4X4.75"

## (undated) DEVICE — SUT VICRYL 3-0 27" CT-1

## (undated) DEVICE — SUT PROLENE 4-0 36" SH

## (undated) DEVICE — PRESSURE INFUSOR BAG 1000ML

## (undated) DEVICE — SUT SILK 2-0 18" SH (POP-OFF)

## (undated) DEVICE — PACK VALVE

## (undated) DEVICE — SUT PROLENE 4-0 30" SH-1

## (undated) DEVICE — DRSG OPSITE 13.75 X 4"

## (undated) DEVICE — SUT PLEDGET 9MM X 4MM X 1.5MM

## (undated) DEVICE — POSITIONER FOAM EGG CRATE ULNAR 2PCS (PINK)

## (undated) DEVICE — MARKING PEN W RULER

## (undated) DEVICE — GLV 7.5 PROTEXIS (WHITE)

## (undated) DEVICE — SYNOVIS VASCULAR PROBE 1.5MM 15CM

## (undated) DEVICE — DRSG OPSITE 2.5 X 2"

## (undated) DEVICE — ELCTR MULTIFUNCTION DEFIBRILLATION ELECTRODE EDGE SYSTEM ADULT

## (undated) DEVICE — DRAPE TOWEL BLUE 17" X 24"

## (undated) DEVICE — SUT PROLENE 6-0 30" C-1

## (undated) DEVICE — ELCTR BOVIE PENCIL HANDPIECE ROCKER SWITCH 15FT

## (undated) DEVICE — SOL IRR POUR NS 0.9% 1000ML

## (undated) DEVICE — SUT VICRYL 1 36" CTX UNDYED

## (undated) DEVICE — STAPLER SKIN PROXIMATE

## (undated) DEVICE — PACK OPEN HEART VAMP PLUS

## (undated) DEVICE — LAP PAD 18 X 18"

## (undated) DEVICE — DRSG MEPILEX 10 X 10CM (4 X 4") AG

## (undated) DEVICE — SUT SILK 4-0 30" RB-1

## (undated) DEVICE — DRAPE 3/4 SHEET 52X76"

## (undated) DEVICE — VESSEL LOOP MAXI-RED  0.120" X 16"